# Patient Record
Sex: FEMALE | Race: WHITE | NOT HISPANIC OR LATINO | Employment: FULL TIME | URBAN - METROPOLITAN AREA
[De-identification: names, ages, dates, MRNs, and addresses within clinical notes are randomized per-mention and may not be internally consistent; named-entity substitution may affect disease eponyms.]

---

## 2017-02-20 DIAGNOSIS — F17.200 TOBACCO USE DISORDER: ICD-10-CM

## 2017-02-20 DIAGNOSIS — Z71.6 ENCOUNTER FOR SMOKING CESSATION COUNSELING: ICD-10-CM

## 2017-02-20 NOTE — TELEPHONE ENCOUNTER
Nicotine 14 MG / 24 hr patch    Last Written Prescription Date: 11/28/2016  Last Fill Quantity: 30, # refills: 1  Last Office Visit with [facility], [facility] or [facility] prescribing provider: 07/18/2016        BP Readings from Last 3 Encounters:   07/18/16 116/80     Minh Antunez CPhT  [facility]  [phone]

## 2017-02-23 NOTE — TELEPHONE ENCOUNTER
Routing refill request to provider for review/approval because:  Patient needs to be seen because:  Needs follow up per 7/18/2016 visit

## 2017-02-27 RX ORDER — NICOTINE 21 MG/24HR
1 PATCH, TRANSDERMAL 24 HOURS TRANSDERMAL EVERY 24 HOURS
Qty: 30 PATCH | Refills: 1 | OUTPATIENT
Start: 2017-02-27

## 2017-02-28 NOTE — TELEPHONE ENCOUNTER
Team coordinators-Please call patient to inform her either office visit or Evisit needed to address this refill request.    Thank you!  ANNABELLA NoelN, RN

## 2017-04-10 ENCOUNTER — OFFICE VISIT (OUTPATIENT)
Dept: PEDIATRICS | Facility: CLINIC | Age: 27
End: 2017-04-10
Payer: COMMERCIAL

## 2017-04-10 VITALS
SYSTOLIC BLOOD PRESSURE: 112 MMHG | OXYGEN SATURATION: 98 % | WEIGHT: 222.2 LBS | HEIGHT: 65 IN | DIASTOLIC BLOOD PRESSURE: 66 MMHG | BODY MASS INDEX: 37.02 KG/M2 | TEMPERATURE: 97.5 F | HEART RATE: 100 BPM

## 2017-04-10 DIAGNOSIS — Z00.00 ROUTINE HEALTH MAINTENANCE: Primary | ICD-10-CM

## 2017-04-10 DIAGNOSIS — R63.5 WEIGHT GAIN: ICD-10-CM

## 2017-04-10 DIAGNOSIS — Z72.0 TOBACCO ABUSE: ICD-10-CM

## 2017-04-10 DIAGNOSIS — H61.22 IMPACTED CERUMEN OF LEFT EAR: ICD-10-CM

## 2017-04-10 PROCEDURE — 99212 OFFICE O/P EST SF 10 MIN: CPT | Mod: 25 | Performed by: NURSE PRACTITIONER

## 2017-04-10 PROCEDURE — 99395 PREV VISIT EST AGE 18-39: CPT | Performed by: NURSE PRACTITIONER

## 2017-04-10 RX ORDER — NICOTINE 21 MG/24HR
1 PATCH, TRANSDERMAL 24 HOURS TRANSDERMAL EVERY 24 HOURS
Qty: 30 PATCH | Refills: 3 | Status: SHIPPED | OUTPATIENT
Start: 2017-04-10 | End: 2018-05-29

## 2017-04-10 ASSESSMENT — ANXIETY QUESTIONNAIRES
1. FEELING NERVOUS, ANXIOUS, OR ON EDGE: NOT AT ALL
6. BECOMING EASILY ANNOYED OR IRRITABLE: NOT AT ALL
IF YOU CHECKED OFF ANY PROBLEMS ON THIS QUESTIONNAIRE, HOW DIFFICULT HAVE THESE PROBLEMS MADE IT FOR YOU TO DO YOUR WORK, TAKE CARE OF THINGS AT HOME, OR GET ALONG WITH OTHER PEOPLE: NOT DIFFICULT AT ALL
2. NOT BEING ABLE TO STOP OR CONTROL WORRYING: NOT AT ALL
7. FEELING AFRAID AS IF SOMETHING AWFUL MIGHT HAPPEN: NOT AT ALL
5. BEING SO RESTLESS THAT IT IS HARD TO SIT STILL: NOT AT ALL
3. WORRYING TOO MUCH ABOUT DIFFERENT THINGS: NOT AT ALL
GAD7 TOTAL SCORE: 0

## 2017-04-10 ASSESSMENT — PATIENT HEALTH QUESTIONNAIRE - PHQ9: 5. POOR APPETITE OR OVEREATING: NOT AT ALL

## 2017-04-10 NOTE — MR AVS SNAPSHOT
After Visit Summary   4/10/2017    Nhung Concepcion    MRN: [id number]           Patient Information     Date Of Birth          1990        Visit Information        Provider Department      4/10/2017 1:20 PM Kimberly Navarro APRN [facility] Instructions    1. Use the highest dose of the nicotine patch (21 mg/day) for six weeks, followed by 14 mg/day for two weeks, and finish with 7 mg/day for two weeks  2. They can chew one piece of gum every one to two hours for six weeks, with a gradual reduction over a second six weeks, for a total duration of three months. Acidic beverages (eg, coffee, carbonated drinks) should be avoided before and during gum use, as acidic beverages lowers oral pH, causing nicotine to ionize and reducing nicotine absorption  3. Accountability check-in partner  4. Look into acupuncture and hypnosis.    5. Ask about switching from Paxil to one that doesn't cause weight gain  6. Come back with a 5 day food/drink journal.    Preventive Health Recommendations  Female Ages 26 - 39  Yearly exam:   See your health care provider every year in order to    Review health changes.     Discuss preventive care.      Review your medicines if you your doctor has prescribed any.    Until age 30: Get a Pap test every three years (more often if you have had an abnormal result).    After age 30: Talk to your doctor about whether you should have a Pap test every 3 years or have a Pap test with HPV screening every 5 years.   You do not need a Pap test if your uterus was removed (hysterectomy) and you have not had cancer.  You should be tested each year for STDs (sexually transmitted diseases), if you're at risk.   Talk to your provider about how often to have your cholesterol checked.  If you are at risk for diabetes, you should have a diabetes test (fasting glucose).  Shots: Get a flu shot each year. Get a tetanus shot every 10 years.   Nutrition:     Eat at least 5  servings of fruits and vegetables each day.    Eat whole-grain bread, whole-wheat pasta and brown rice instead of white grains and rice.    Talk to your provider about Calcium and Vitamin D.     Lifestyle    Exercise at least 150 minutes a week (30 minutes a day, 5 days of the week). This will help you control your weight and prevent disease.    Limit alcohol to one drink per day.    No smoking.     Wear sunscreen to prevent skin cancer.    See your dentist every six months for an exam and cleaning.          Follow-ups after your visit        Who to contact     If you have questions or need follow up information about today's clinic visit or your schedule please contact [facility] directly at [phone].  Normal or non-critical lab and imaging results will be communicated to you by SpinalMotionhart, letter or phone within 4 business days after the clinic has received the results. If you do not hear from us within 7 days, please contact the clinic through Fishkit or phone. If you have a critical or abnormal lab result, we will notify you by phone as soon as possible.  Submit refill requests through Rollbase (acquired by [employer]) or call your pharmacy and they will forward the refill request to us. Please allow 3 business days for your refill to be completed.          Additional Information About Your Visit        Rollbase (acquired [employer]) Information     Rollbase (acquired by [employer]) gives you secure access to your electronic health record. If you see a primary care provider, you can also send messages to your care team and make appointments. If you have questions, please call your primary care clinic.  If you do not have a primary care provider, please call [phone] and they will assist you.        Care EveryWhere ID     This is your Care EveryWhere ID. This could be used by other organizations to access your [place] medical records  [id number]        Your Vitals Were     Pulse Temperature Height Last Period Pulse Oximetry BMI (Body Mass Index)    100 97.5  F (36.4  " C) (Tympanic) 5' 4.5\" (1.638 m) 03/20/2017 (Approximate) 98% 37.55 kg/m2       Blood Pressure from Last 3 Encounters:   04/10/17 112/66   07/18/16 116/80    Weight from Last 3 Encounters:   04/10/17 222 lb 3.2 oz (100.8 kg)   07/18/16 208 lb (94.3 kg)              Today, you had the following     No orders found for display         Today's Medication Changes          These changes are accurate as of: 4/10/17  2:14 PM.  If you have any questions, ask your nurse or doctor.               These medicines have changed or have updated prescriptions.        Dose/Directions    omeprazole 40 MG capsule   Commonly known as:  priLOSEC   This may have changed:  Another medication with the same name was removed. Continue taking this medication, and follow the directions you see here.   Used for:  Gastroesophageal reflux disease without esophagitis   Changed by:  Kristal Gordillo APRN CNP        Dose:  40 mg   Take 1 capsule (40 mg) by mouth daily Take 30-60 minutes before a meal.   Quantity:  90 capsule   Refills:  3         Stop taking these medicines if you haven't already. Please contact your care team if you have questions.     FLUOXETINE HCL PO   Stopped by:  Kimberly Navarro APRN CNP                    Primary Care Provider Office Phone # Fax #    YEN Guo [phone]       [address] [place] [address]        Thank you!     Thank you for choosing [facility]  for your care. Our goal is always to provide you with excellent care. Hearing back from our patients is one way we can continue to improve our services. Please take a few minutes to complete the written survey that you may receive in the mail after your visit with us. Thank you!             Your Updated Medication List - Protect others around you: Learn how to safely use, store and throw away your medicines at www.disposemymeds.org.          This list is accurate as of: " 4/10/17  2:14 PM.  Always use your most recent med list.                   Brand Name Dispense Instructions for use    FLUoxetine 20 MG capsule    PROzac    90 capsule    Take 1 capsule (20 mg) by mouth daily       nicotine 14 MG/24HR 24 hr patch    NICODERM CQ    30 patch    Place 1 patch onto the skin every 24 hours       omeprazole 40 MG capsule    priLOSEC    90 capsule    Take 1 capsule (40 mg) by mouth daily Take 30-60 minutes before a meal.       PAXIL PO      Take 10 mg by mouth daily Patient unsure of name - thinks it is this one - unsure of dose

## 2017-04-10 NOTE — PROGRESS NOTES
"   SUBJECTIVE:     CC: Nhung Concepcion is an 27 year old woman who presents for preventive health visit.     Answers for HPI/ROS submitted by the patient on 4/10/2017   Annual Exam:  Getting at least 3 servings of Calcium per day:: Yes  Bi-annual eye exam:: NO  Dental care twice a year:: Yes  Sleep apnea or symptoms of sleep apnea:: None  Diet:: Regular (no restrictions)  Frequency of exercise:: 1 day/week  Taking medications regularly:: Yes  Medication side effects:: None  Additional concerns today:: No  Q1: Little interest or pleasure in doing things: 0=Not at all  Q2: Feeling down, depressed or hopeless: 0=Not at all  PHQ-2 Score: 0  Duration of exercise:: N/A    Concerns today: wants refills of nicotine patches. Smokes about 1 PPD. Has been using 21mg patches. No gum or lozenge. Triggers include softball and \"going out.\" Recent worsening of anxiety and depression.   Has had some ear itchiness on left side x 6 months. No headphones or ear buds. No fevers.   Weight gain: Worsened in the last 3months. Has been on Paxil for 1 month. Thinks the depression has caused it. Has given up and is now eating whatever she wants.     -------------------------------------    Today's PHQ-2 Score:   PHQ-2 ( 1999 Pfizer) 4/10/2017 4/10/2017   Q1: Little interest or pleasure in doing things 0 0   Q2: Feeling down, depressed or hopeless 0 0   PHQ-2 Score 0 0   Little interest or pleasure in doing things Not at all -   Feeling down, depressed or hopeless Not at all -   PHQ-2 Score 0 -     Abuse: Current or Past(Physical, Sexual or Emotional)- No  Do you feel safe in your environment - Yes    Social History   Substance Use Topics     Smoking status: Former Smoker     Types: Cigarettes     Quit date: 4/1/2017     Smokeless tobacco: Never Used     Alcohol use Yes      Comment: 2 times per month, 4 drinks per time     The patient does not drink >3 drinks per day nor >7 drinks per week.    No results for input(s): CHOL, HDL, LDL, TRIG, " "TYSON CARMONA in the last 26335 hours.    Reviewed orders with patient.  Reviewed health maintenance and updated orders accordingly - Yes    Mammo Decision Support:  Mammogram not appropriate for this patient based on age.    Pertinent mammograms are reviewed under the imaging tab.  History of abnormal Pap smear: NO - age 30- 65 PAP every 3 years recommended    Reviewed and updated as needed this visit by clinical staff  Tobacco  Allergies  Meds  Med Hx  Surg Hx  Fam Hx  Soc Hx        Reviewed and updated as needed this visit by Provider            ROS:  C: NEGATIVE for fever, chills, change in weight  I: NEGATIVE for worrisome rashes, moles or lesions  E: NEGATIVE for vision changes or irritation  ENT: NEGATIVE for ear, mouth and throat problems  R: NEGATIVE for significant cough or SOB  B: NEGATIVE for masses, tenderness or discharge  CV: NEGATIVE for chest pain, palpitations or peripheral edema  GI: NEGATIVE for nausea, abdominal pain, heartburn, or change in bowel habits  : NEGATIVE for unusual urinary or vaginal symptoms. Periods are regular.  M: NEGATIVE for significant arthralgias or myalgia  N: NEGATIVE for weakness, dizziness or paresthesias  P: NEGATIVE for changes in mood or affect    Problem list, Medication list, Allergies, and Medical/Social/Surgical histories reviewed in [facility] and updated as appropriate.  OBJECTIVE:     /66 (Cuff Size: Adult Large)  Pulse 100  Temp 97.5  F (36.4  C) (Tympanic)  Ht 5' 4.5\" (1.638 m)  Wt 222 lb 3.2 oz (100.8 kg)  LMP 03/20/2017 (Approximate)  SpO2 98%  BMI 37.55 kg/m2  EXAM:  GENERAL: healthy, alert and no distress  EYES: Eyes grossly normal to inspection, PERRL and conjunctivae and sclerae normal  HENT: Impacted cerumen left. Clear post-irrigation. Right ear unremarkable.  NECK: no adenopathy, no asymmetry, masses, or scars and thyroid normal to palpation  RESP: lungs clear to auscultation - no rales, rhonchi or wheezes  BREAST: normal without " masses, tenderness or nipple discharge and no palpable axillary masses or adenopathy  CV: regular rate and rhythm, normal S1 S2, no S3 or S4, no murmur, click or rub, no peripheral edema and peripheral pulses strong  ABDOMEN: soft, nontender, no hepatosplenomegaly, no masses and bowel sounds normal  MS: no gross musculoskeletal defects noted, no edema  SKIN: no suspicious lesions or rashes  NEURO: Normal strength and tone, mentation intact and speech normal  PSYCH: mentation appears normal, affect normal/bright    ASSESSMENT/PLAN:     1. Routine health maintenance  Declines labs today. Will have her come fasting at next year's physical.  States she had an abnormal pap 3 paps ago. Has had 2 normal paps since then.     2. Weight gain  Likely multifactorial. Likely due to depression as well as depression medications.   Asked her to discuss Paxil with her psychiatrist and see if she can switch to something with less weight gain potential.  I asked her to bring in a 5 day food journal to discuss.   Will consider weight loss meds and checking thyroid.     3. Tobacco abuse  1 PPD. Has had difficulty quitting. Social events trigger smoking desire.   - nicotine (NICODERM CQ) 21 MG/24HR 24 hr patch; Place 1 patch onto the skin every 24 hours  Dispense: 30 patch; Refill: 3  - nicotine polacrilex ([employer]S NICOTINE POLACRILEX) 2 MG gum; Place 1 each (2 mg) inside cheek as needed for smoking cessation  Dispense: 60 tablet; Refill: 3  -Declines quitplan.gov.    Patient Instructions   1. Use the highest dose of the nicotine patch (21 mg/day) for six weeks, followed by 14 mg/day for two weeks, and finish with 7 mg/day for two weeks  2. They can chew one piece of gum every one to two hours for six weeks, with a gradual reduction over a second six weeks, for a total duration of three months. Acidic beverages (eg, coffee, carbonated drinks) should be avoided before and during gum use, as acidic beverages lowers oral pH, causing nicotine to  "ionize and reducing nicotine absorption  3. Accountability check-in partner  4. Look into acupuncture and hypnosis for smoking cessation.      4. Impacted cerumen of left ear  Resolved. Call if ear still itching.       COUNSELING:   Reviewed preventive health counseling, as reflected in patient instructions         reports that she quit smoking 9 days ago. Her smoking use included Cigarettes. She has never used smokeless tobacco.    Estimated body mass index is 37.55 kg/(m^2) as calculated from the following:    Height as of this encounter: 5' 4.5\" (1.638 m).    Weight as of this encounter: 222 lb 3.2 oz (100.8 kg).   Weight management plan: Discussed healthy diet and exercise guidelines and patient will follow up in 12 months in clinic to re-evaluate.    Counseling Resources:  ATP IV Guidelines  Pooled Cohorts Equation Calculator  Breast Cancer Risk Calculator  FRAX Risk Assessment  ICSI Preventive Guidelines  Dietary Guidelines for Americans, 2010  USDA's MyPlate  ASA Prophylaxis  Lung CA Screening    Kimberly Navarro, YEN BALDERRAMA  [facility]  "

## 2017-04-10 NOTE — PATIENT INSTRUCTIONS
1. Use the highest dose of the nicotine patch (21 mg/day) for six weeks, followed by 14 mg/day for two weeks, and finish with 7 mg/day for two weeks  2. They can chew one piece of gum every one to two hours for six weeks, with a gradual reduction over a second six weeks, for a total duration of three months. Acidic beverages (eg, coffee, carbonated drinks) should be avoided before and during gum use, as acidic beverages lowers oral pH, causing nicotine to ionize and reducing nicotine absorption  3. Accountability check-in partner  4. Look into acupuncture and hypnosis.    5. Ask about switching from Paxil to one that doesn't cause weight gain  6. Come back with a 5 day food/drink journal.    Preventive Health Recommendations  Female Ages 26 - 39  Yearly exam:   See your health care provider every year in order to    Review health changes.     Discuss preventive care.      Review your medicines if you your doctor has prescribed any.    Until age 30: Get a Pap test every three years (more often if you have had an abnormal result).    After age 30: Talk to your doctor about whether you should have a Pap test every 3 years or have a Pap test with HPV screening every 5 years.   You do not need a Pap test if your uterus was removed (hysterectomy) and you have not had cancer.  You should be tested each year for STDs (sexually transmitted diseases), if you're at risk.   Talk to your provider about how often to have your cholesterol checked.  If you are at risk for diabetes, you should have a diabetes test (fasting glucose).  Shots: Get a flu shot each year. Get a tetanus shot every 10 years.   Nutrition:     Eat at least 5 servings of fruits and vegetables each day.    Eat whole-grain bread, whole-wheat pasta and brown rice instead of white grains and rice.    Talk to your provider about Calcium and Vitamin D.     Lifestyle    Exercise at least 150 minutes a week (30 minutes a day, 5 days of the week). This will help you  control your weight and prevent disease.    Limit alcohol to one drink per day.    No smoking.     Wear sunscreen to prevent skin cancer.    See your dentist every six months for an exam and cleaning.

## 2017-04-10 NOTE — NURSING NOTE
"Chief Complaint   Patient presents with     Physical       Initial /66 (Cuff Size: Adult Large)  Pulse 100  Temp 97.5  F (36.4  C) (Tympanic)  Ht 5' 4.5\" (1.638 m)  Wt 222 lb 3.2 oz (100.8 kg)  LMP 03/20/2017 (Approximate)  SpO2 98%  BMI 37.55 kg/m2 Estimated body mass index is 37.55 kg/(m^2) as calculated from the following:    Height as of this encounter: 5' 4.5\" (1.638 m).    Weight as of this encounter: 222 lb 3.2 oz (100.8 kg).  Medication Reconciliation: complete   Magi Sanchez CMA    "

## 2017-04-10 NOTE — NURSING NOTE
LEft ear wash completed. moderate amount of cerumen removed from left ear. Patient tolerated procedure well.  Magi Sanchez, CMA

## 2017-04-11 ASSESSMENT — PATIENT HEALTH QUESTIONNAIRE - PHQ9: SUM OF ALL RESPONSES TO PHQ QUESTIONS 1-9: 0

## 2017-04-11 ASSESSMENT — ANXIETY QUESTIONNAIRES: GAD7 TOTAL SCORE: 0

## 2017-04-17 ENCOUNTER — OFFICE VISIT (OUTPATIENT)
Dept: PEDIATRICS | Facility: CLINIC | Age: 27
End: 2017-04-17
Payer: COMMERCIAL

## 2017-04-17 VITALS
OXYGEN SATURATION: 98 % | BODY MASS INDEX: 37.2 KG/M2 | HEART RATE: 85 BPM | HEIGHT: 65 IN | DIASTOLIC BLOOD PRESSURE: 62 MMHG | SYSTOLIC BLOOD PRESSURE: 108 MMHG | WEIGHT: 223.3 LBS | TEMPERATURE: 97.3 F

## 2017-04-17 DIAGNOSIS — R63.5 WEIGHT GAIN: Primary | ICD-10-CM

## 2017-04-17 DIAGNOSIS — H60.502 ACUTE OTITIS EXTERNA OF LEFT EAR, UNSPECIFIED TYPE: ICD-10-CM

## 2017-04-17 DIAGNOSIS — L67.8 ABNORMAL FACIAL HAIR: ICD-10-CM

## 2017-04-17 PROCEDURE — 99214 OFFICE O/P EST MOD 30 MIN: CPT | Performed by: NURSE PRACTITIONER

## 2017-04-17 RX ORDER — CLOTRIMAZOLE 1 G/ML
SOLUTION TOPICAL 2 TIMES DAILY
Qty: 60 ML | Refills: 1 | Status: SHIPPED | OUTPATIENT
Start: 2017-04-17 | End: 2017-05-01

## 2017-04-17 NOTE — NURSING NOTE
"Chief Complaint   Patient presents with     RECHECK     weight issue       Initial /62 (Cuff Size: Adult Large)  Pulse 85  Temp 97.3  F (36.3  C) (Tympanic)  Ht 5' 4.5\" (1.638 m)  Wt 223 lb 4.8 oz (101.3 kg)  LMP 03/20/2017 (Approximate)  SpO2 98%  BMI 37.74 kg/m2 Estimated body mass index is 37.74 kg/(m^2) as calculated from the following:    Height as of this encounter: 5' 4.5\" (1.638 m).    Weight as of this encounter: 223 lb 4.8 oz (101.3 kg).  Medication Reconciliation: complete   Magi Sanchez, RADHA    "

## 2017-04-17 NOTE — PATIENT INSTRUCTIONS
1. Do weight watchers for 1 month  2. You need vigorous exercise 2 days a week.   3. Talk to psychiatrist about Paxil.   4. 30 squats every morning.

## 2017-04-17 NOTE — MR AVS SNAPSHOT
After Visit Summary   4/17/2017    Nhung Concepcion    MRN: [id number]           Patient Information     Date Of Birth          1990        Visit Information        Provider Department      4/17/2017 4:20 PM Kimberly Navarro APRN CNP [facility]        Today's Diagnoses     Acute otitis externa of left ear, unspecified type    -  1      Care Instructions    1. Do weight watchers for 1 month  2. You need vigorous exercise 2 days a week.   3. Talk to psychiatrist about Paxil.   4. 30 squats every morning.         Follow-ups after your visit        Who to contact     If you have questions or need follow up information about today's clinic visit or your schedule please contact [facility] directly at [phone].  Normal or non-critical lab and imaging results will be communicated to you by Sonya Labshart, letter or phone within 4 business days after the clinic has received the results. If you do not hear from us within 7 days, please contact the clinic through Sonya Labshart or phone. If you have a critical or abnormal lab result, we will notify you by phone as soon as possible.  Submit refill requests through [employer] or call your pharmacy and they will forward the refill request to us. Please allow 3 business days for your refill to be completed.          Additional Information About Your Visit        MyChart Information     [employer] gives you secure access to your electronic health record. If you see a primary care provider, you can also send messages to your care team and make appointments. If you have questions, please call your primary care clinic.  If you do not have a primary care provider, please call [phone] and they will assist you.        Care EveryWhere ID     This is your Care EveryWhere ID. This could be used by other organizations to access your [place] medical records  [id number]        Your Vitals Were     Pulse Temperature Height Last Period Pulse Oximetry BMI  "(Body Mass Index)    85 97.3  F (36.3  C) (Tympanic) 5' 4.5\" (1.638 m) 03/20/2017 (Approximate) 98% 37.74 kg/m2       Blood Pressure from Last 3 Encounters:   04/17/17 108/62   04/10/17 112/66   07/18/16 116/80    Weight from Last 3 Encounters:   04/17/17 223 lb 4.8 oz (101.3 kg)   04/10/17 222 lb 3.2 oz (100.8 kg)   07/18/16 208 lb (94.3 kg)              Today, you had the following     No orders found for display         Today's Medication Changes          These changes are accurate as of: 4/17/17  4:54 PM.  If you have any questions, ask your nurse or doctor.               Start taking these medicines.        Dose/Directions    clotrimazole 1 % solution   Commonly known as:  LOTRIMIN   Used for:  Acute otitis externa of left ear, unspecified type   Started by:  Kimberly Navarro APRN CNP        Apply topically 2 times daily for 14 days   Quantity:  60 mL   Refills:  1            Where to get your medicines      These medications were sent to [facility] [place], [place] - [address]  [address] [address]     Phone:  [phone]     clotrimazole 1 % solution                Primary Care Provider Office Phone # Fax #    YEN Guo [phone]       [address] [place] [address]        Thank you!     Thank you for choosing [facility]  for your care. Our goal is always to provide you with excellent care. Hearing back from our patients is one way we can continue to improve our services. Please take a few minutes to complete the written survey that you may receive in the mail after your visit with us. Thank you!             Your Updated Medication List - Protect others around you: Learn how to safely use, store and throw away your medicines at www.disposemymeds.org.          This list is accurate as of: 4/17/17  4:54 PM.  Always use your most recent med list.                   Brand " Name Dispense Instructions for use    clotrimazole 1 % solution    LOTRIMIN    60 mL    Apply topically 2 times daily for 14 days       FLUoxetine 20 MG capsule    PROzac    90 capsule    Take 1 capsule (20 mg) by mouth daily       * nicotine 14 MG/24HR 24 hr patch    NICODERM CQ    30 patch    Place 1 patch onto the skin every 24 hours       * nicotine 21 MG/24HR 24 hr patch    NICODERM CQ    30 patch    Place 1 patch onto the skin every 24 hours       nicotine polacrilex 2 MG gum    CVS NICOTINE POLACRILEX    60 tablet    Place 1 each (2 mg) inside cheek as needed for smoking cessation       omeprazole 40 MG capsule    priLOSEC    90 capsule    Take 1 capsule (40 mg) by mouth daily Take 30-60 minutes before a meal.       PAXIL PO      Take 10 mg by mouth daily Patient unsure of name - thinks it is this one - unsure of dose       * Notice:  This list has 2 medication(s) that are the same as other medications prescribed for you. Read the directions carefully, and ask your doctor or other care provider to review them with you.

## 2017-04-17 NOTE — PROGRESS NOTES
"  SUBJECTIVE:                                                    Nhung Concepcion is a 27 year old female who presents to clinic today for the following health issues:    Patient here for recheck of weight issue.:      Bring food log:  Eating meat, rice, other carbs. Zero fruits and veggies as it is \"not my thing.\"  Reports having been \"good\" while doing this food journal and still going over on calories recommended by my[employer]ubrikar Aortic Valve pal to lose weight.    Depression is severe right now. Started Paxil a few weeks ago. Has contacted her psychiatrist but hasn't heard back about switching to a medicine that won't cause weight gain.    ROS: const/endo/psych otherwise negative     OBJECTIVE:  /62 (Cuff Size: Adult Large)  Pulse 85  Temp 97.3  F (36.3  C) (Tympanic)  Ht 5' 4.5\" (1.638 m)  Wt 223 lb 4.8 oz (101.3 kg)  LMP 03/20/2017 (Approximate)  SpO2 98%  BMI 37.74 kg/m2  CONSTITUTIONAL: Alert, well-nourished, well-groomed, NAD  RESP: Lungs CTA. No wheeze, rhonchi, rales.  CV: HRRR S1 S2 No MRG. No peripheral edema      ASSESSMENT/PLAN:  (R63.5) Weight gain  (primary encounter diagnosis)  Comment: Likely due to diet. See above. Eating zero fruits and veggies, processed grains, and meat. Endorses eating less than normal while she was doing the food log.   Plan: Discussed that, in order to lose weight, she needs to follow myThubrikar Aortic Valve pal plan more strictly for several months. Alternatively, she can follow weight watchers. Increase vegetables and whole grains. Decrease processed foods.   Vigorous exercise 2 times weekly, daily activity as well.  Switch from Paxil to a less weight gain potential medication.      (L67.8) Abnormal facial hair  Comment: Facial hair and increased body hair, not in androgen sensitive areas with normal periods. Likely PCOS despite normal periods. Likely worsened by weight gain.   Plan: **Testosterone Free and Total FUTURE anytime,         Prolactin, Follicle stimulating hormone, TSH         " with free T4 reflex, 17 Hydroxyprogesterone         Pediatric            (H60.502) Acute otitis externa of left ear, unspecified type  Comment: Likely fungal. Her main sx is itching.   Plan: clotrimazole (LOTRIMIN) 1 % solution                Kimberly Navarro, CASTILLO-LINO.

## 2017-04-24 ENCOUNTER — TELEPHONE (OUTPATIENT)
Dept: PEDIATRICS | Facility: CLINIC | Age: 27
End: 2017-04-24

## 2017-04-24 NOTE — LETTER
[facility]  [address] [place] [address]  [phone]      April 24, 2017    Nhung Concepcion                                                                                                                                                       [address] [place] [id number]                Tarik Kelly,     I have ordered some labs to be done to look into your body hair issue. Please schedule a lab visit. Ideally you would come fasting (nothing to eat or drink for 10 hours) between the hours of 8 and 10am for most accurate results.     Thanks!     Kimberly Navarro, CASTILLO-LINO.

## 2017-04-24 NOTE — TELEPHONE ENCOUNTER
Tarik Kelly,    I have ordered some labs to be done to look into your body hair issue. Please schedule a lab visit. Ideally you would come fasting (nothing to eat or drink for 10 hours) between the hours of 8 and 10am for most accurate results.    Thanks!    Kimberly Navarro, CASTILLO-LINO.

## 2017-05-05 DIAGNOSIS — L67.8 ABNORMAL FACIAL HAIR: ICD-10-CM

## 2017-05-05 LAB
FSH SERPL-ACNC: 1.9 IU/L
PROLACTIN SERPL-MCNC: 21 UG/L (ref 3–27)
TSH SERPL DL<=0.005 MIU/L-ACNC: 0.86 MU/L (ref 0.4–4)

## 2017-05-05 PROCEDURE — 99000 SPECIMEN HANDLING OFFICE-LAB: CPT | Performed by: NURSE PRACTITIONER

## 2017-05-05 PROCEDURE — 84146 ASSAY OF PROLACTIN: CPT | Performed by: NURSE PRACTITIONER

## 2017-05-05 PROCEDURE — 83001 ASSAY OF GONADOTROPIN (FSH): CPT | Performed by: NURSE PRACTITIONER

## 2017-05-05 PROCEDURE — 83498 ASY HYDROXYPROGESTERONE 17-D: CPT | Mod: 90 | Performed by: NURSE PRACTITIONER

## 2017-05-05 PROCEDURE — 36415 COLL VENOUS BLD VENIPUNCTURE: CPT | Performed by: NURSE PRACTITIONER

## 2017-05-05 PROCEDURE — 84403 ASSAY OF TOTAL TESTOSTERONE: CPT | Performed by: NURSE PRACTITIONER

## 2017-05-05 PROCEDURE — 84270 ASSAY OF SEX HORMONE GLOBUL: CPT | Performed by: NURSE PRACTITIONER

## 2017-05-05 PROCEDURE — 84443 ASSAY THYROID STIM HORMONE: CPT | Performed by: NURSE PRACTITIONER

## 2017-05-06 LAB
SHBG SERPL-SCNC: 23 NMOL/L (ref 30–135)
TESTOST FREE SERPL-MCNC: 0.5 NG/DL (ref 0.08–0.74)
TESTOST SERPL-MCNC: 23 NG/DL (ref 8–60)

## 2017-05-14 LAB — LAB SCANNED RESULT: NORMAL

## 2017-05-17 ENCOUNTER — MYC REFILL (OUTPATIENT)
Dept: PEDIATRICS | Facility: CLINIC | Age: 27
End: 2017-05-17

## 2017-05-17 DIAGNOSIS — Z72.0 TOBACCO ABUSE: ICD-10-CM

## 2017-05-17 RX ORDER — NICOTINE 21 MG/24HR
1 PATCH, TRANSDERMAL 24 HOURS TRANSDERMAL EVERY 24 HOURS
Qty: 30 PATCH | Refills: 3 | Status: CANCELLED | OUTPATIENT
Start: 2017-05-17

## 2017-05-18 NOTE — TELEPHONE ENCOUNTER
Message from MyChart:  Original authorizing provider: YEN Guo CNP would like a refill of the following medications:  nicotine (NICODERM CQ) 21 MG/24HR 24 hr patch [YEN Guo CNP]    Preferred pharmacy: [facility] - [place], [place] - [address] [place] [address]    Comment:

## 2017-05-18 NOTE — TELEPHONE ENCOUNTER
Has refills left, no refill needed now. Notified pt.    Nicotine patches     Last Written Prescription Date: 04/10/17  Last Fill Quantity: 30, # refills: 3  Last Office Visit with G, ANA or [facility] prescribing provider: 04/17/17        BP Readings from Last 3 Encounters:   04/17/17 108/62   04/10/17 112/66   07/18/16 116/80     Leland, RN  Triage Nurse

## 2017-06-12 ENCOUNTER — MYC REFILL (OUTPATIENT)
Dept: PEDIATRICS | Facility: CLINIC | Age: 27
End: 2017-06-12

## 2017-06-12 DIAGNOSIS — Z72.0 TOBACCO ABUSE: ICD-10-CM

## 2017-06-13 ENCOUNTER — HOSPITAL ENCOUNTER (EMERGENCY)
Facility: CLINIC | Age: 27
Discharge: HOME | End: 2017-06-13
Attending: EMERGENCY MEDICINE | Admitting: EMERGENCY MEDICINE
Payer: COMMERCIAL

## 2017-06-13 VITALS
TEMPERATURE: 97.9 F | HEART RATE: 76 BPM | DIASTOLIC BLOOD PRESSURE: 80 MMHG | RESPIRATION RATE: 16 BRPM | SYSTOLIC BLOOD PRESSURE: 125 MMHG | OXYGEN SATURATION: 98 %
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
## 2017-06-13 DIAGNOSIS — S80.812A ABRASION OF LEFT LEG, INITIAL ENCOUNTER: ICD-10-CM

## 2017-06-13 DIAGNOSIS — S81.802A WOUND OF LEFT LEG, INITIAL ENCOUNTER: ICD-10-CM

## 2017-06-13 PROCEDURE — 99282 EMERGENCY DEPT VISIT SF MDM: CPT | Performed by: EMERGENCY MEDICINE

## 2017-06-13 PROCEDURE — 99282 EMERGENCY DEPT VISIT SF MDM: CPT | Mod: Z6 | Performed by: EMERGENCY MEDICINE

## 2017-06-13 RX ORDER — CEPHALEXIN 500 MG/1
500 CAPSULE ORAL 4 TIMES DAILY
Qty: 40 CAPSULE | Refills: 0 | Status: SHIPPED | OUTPATIENT
Start: 2017-06-13 | End: 2017-06-23

## 2017-06-13 ASSESSMENT — ENCOUNTER SYMPTOMS
FEVER: 0
COLOR CHANGE: 1
CHILLS: 0
WOUND: 1

## 2017-06-13 NOTE — ED AVS SNAPSHOT
[facility], Emergency Department    [address] [place] [address]    Phone:  [phone]                                       Nhung Concepcion   MRN: [id number]    Department:  [facility], Emergency Department   Date of Visit:  6/13/2017           Patient Information     Date Of Birth          1990        Your diagnoses for this visit were:     Wound of left leg, initial encounter        You were seen by Aaliyah Tavares MD.      Follow-up Information     Follow up with Kimberly Navarro APRN CNP In 2 days.    Specialty:  Nurse Practitioner    Why:  As needed    Contact information:    [facility]  [address] [place] [address]  [phone]        24 Hour Appointment Hotline       To make an appointment at any [facility], call [phone] ([phone]). If you don't have a family doctor or clinic, we will help you find one. [facility] clinics are conveniently located to serve the needs of you and your family.             Review of your medicines      START taking        Dose / Directions Last dose taken    cephALEXin 500 MG capsule   Commonly known as:  KEFLEX   Dose:  500 mg   Quantity:  40 capsule        Take 1 capsule (500 mg) by mouth 4 times daily for 10 days   Refills:  0          Our records show that you are taking the medicines listed below. If these are incorrect, please call your family doctor or clinic.        Dose / Directions Last dose taken    FLUoxetine 20 MG capsule   Commonly known as:  PROzac   Dose:  20 mg   Quantity:  90 capsule        Take 1 capsule (20 mg) by mouth daily   Refills:  1        * nicotine 14 MG/24HR 24 hr patch   Commonly known as:  NICODERM CQ   Dose:  1 patch   Quantity:  30 patch        Place 1 patch onto the skin every 24 hours   Refills:  1        * nicotine 21 MG/24HR 24 hr patch   Commonly known as:  NICODERM CQ   Dose:  1 patch   Quantity:  30 patch        Place 1 patch onto the skin every 24 hours   Refills:   3        nicotine polacrilex 2 MG gum   Commonly known as:  CVS NICOTINE POLACRILEX   Dose:  2 mg   Quantity:  60 tablet        Place 1 each (2 mg) inside cheek as needed for smoking cessation   Refills:  3        omeprazole 40 MG capsule   Commonly known as:  priLOSEC   Dose:  40 mg   Quantity:  90 capsule        Take 1 capsule (40 mg) by mouth daily Take 30-60 minutes before a meal.   Refills:  3        * Notice:  This list has 2 medication(s) that are the same as other medications prescribed for you. Read the directions carefully, and ask your doctor or other care provider to review them with you.            Prescriptions were sent or printed at these locations (1 Prescription)                   Other Prescriptions                Printed at Department/Unit printer (1 of 1)         cephALEXin (KEFLEX) 500 MG capsule                Orders Needing Specimen Collection     None      Pending Results     No orders found from 6/11/2017 to 6/14/2017.            Pending Culture Results     No orders found from 6/11/2017 to 6/14/2017.            Pending Results Instructions     If you had any lab results that were not finalized at the time of your Discharge, you can call the ED Lab Result RN at [phone]. You will be contacted by this team for any positive Lab results or changes in treatment. The nurses are available 7 days a week from 10A to 6:30P.  You can leave a message 24 hours per day and they will return your call.        Thank you for choosing [facility]       Thank you for choosing [facility] for your care. Our goal is always to provide you with excellent care. Hearing back from our patients is one way we can continue to improve our services. Please take a few minutes to complete the written survey that you may receive in the mail after you visit with us. Thank you!        [employer] Information     [email or web address] gives you secure access to your electronic health record. If you see a primary care provider, you can also send  messages to your care team and make appointments. If you have questions, please call your primary care clinic.  If you do not have a primary care provider, please call [phone] and they will assist you.        Care EveryWhere ID     This is your Care EveryWhere ID. This could be used by other organizations to access your [facility] medical records  [id number]        After Visit Summary       This is your record. Keep this with you and show to your community pharmacist(s) and doctor(s) at your next visit.

## 2017-06-13 NOTE — ED NOTES
Pt present ambulatory to triage from work via car. Pt states 6 days ago slid while playing softball and has large abrasion at left lower leg. CMS intact. PT concerned for infection, instructed ot come to ED by PCP. Pt A and O x 4 and afebrile.

## 2017-06-13 NOTE — ED AVS SNAPSHOT
[facility], [place], Emergency Department    [address] [place] [address]    Phone:  [phone]                                       Nhung Concepcion   MRN: [id number]    Department:  [facility], Emergency Department   Date of Visit:  6/13/2017           After Visit Summary Signature Page     I have received my discharge instructions, and my questions have been answered. I have discussed any challenges I see with this plan with the nurse or doctor.    ..........................................................................................................................................  Patient/Patient Representative Signature      ..........................................................................................................................................  Patient Representative Print Name and Relationship to Patient    ..................................................               ................................................  Date                                            Time    ..........................................................................................................................................  Reviewed by Signature/Title    ...................................................              ..............................................  Date                                                            Time

## 2017-06-13 NOTE — ED PROVIDER NOTES
History     Chief Complaint   Patient presents with     Wound Check     HPI   Nhung Concepcion is a 27 year old female who is here with concern for wound infection. The patient states that she was playing softball about 6 days ago and she was sliding. She sustained a significant abrasion at that time, but she feels as though the wound is looking worse. She describes a red painful wound. It encompasses her entire left lower leg. Pain is rated at a 5. She has been using bacitracin at home. She went into work today, she works as a , and they advised her to come into the emergency room to be seen. No fevers, chills, no systemic signs or symptoms.    This part of the medical record was transcribed by Jenni Manjarrez Medical Scribe, from a dictation done by Aaliyah Tavares MD.      Allergies   Allergen Reactions     Sulfa Drugs Rash         I have reviewed the Medications, Allergies, Past Medical and Surgical History, and Social History in the Epic system and with the patient.    Review of Systems   Constitutional: Negative for chills and fever.   Skin: Positive for color change (redness in the area of the wound) and wound (left lower leg abrasion).       Physical Exam   BP: 149/86  Pulse: 71  Temp: 97.9  F (36.6  C)  Resp: 14  SpO2: 97 %  Physical Exam   Constitutional: She is oriented to person, place, and time. She appears well-developed and well-nourished.   HENT:   Head: Atraumatic.   Right Ear: External ear normal.   Left Ear: External ear normal.   Eyes: Conjunctivae and EOM are normal. No scleral icterus.   Neck: Normal range of motion. Neck supple.   Cardiovascular: Normal rate and regular rhythm.    Pulmonary/Chest: Effort normal. No respiratory distress.   Abdominal: Soft. There is no tenderness. There is no rebound and no guarding.   Musculoskeletal: Normal range of motion. She exhibits no edema or tenderness.   Neurological: She is alert and oriented to person, place, and time.   Skin: Skin is warm and  dry. No rash noted.   Large LLE abrasion with well demarcated borders   Psychiatric: She has a normal mood and affect. Her behavior is normal.   Nursing note and vitals reviewed.      ED Course     ED Course     Procedures      Assessments & Plan (with Medical Decision Making)   27 year old female who presents with concern for wound infection. She has no systemic signs or symptoms of illness. Her vital signs are normal. Her wound to me appears to be more of an inflammatory response. I am not particularly convinced that this is a cellulitis at this point. I will, however, provide the patient with a prescription for Keflex. She will carolina the margins of the wound and if she feels as though it is getting worse in 24 hours, she will start taking the antibiotics. She will return to the emergency room for any signs or symptoms of infection.    This part of the medical record was transcribed by Jenni Manjarrez, Medical Scribe, from a dictation done by Aaliyah Tavares MD.      I have reviewed the nursing notes.    I have reviewed the findings, diagnosis, plan and need for follow up with the patient.    Discharge Medication List as of 6/13/2017 10:18 AM      START taking these medications    Details   cephALEXin (KEFLEX) 500 MG capsule Take 1 capsule (500 mg) by mouth 4 times daily for 10 days, Disp-40 capsule, R-0, Local Print             Final diagnoses:   Wound of left leg, initial encounter     6/13/2017   [facility], [place], EMERGENCY DEPARTMENT     Aaliyah Tavares MD  06/13/17 4022

## 2017-06-13 NOTE — TELEPHONE ENCOUNTER
Message from MyChart:  Original authorizing provider: YEN Guo CNP would like a refill of the following medications:  nicotine polacrilex (CVS NICOTINE POLACRILEX) 2 MG gum [YEN Guo CNP]    Preferred pharmacy: [facility] - [place], [place] - [address] [place] [address]    Comment:

## 2017-06-14 ENCOUNTER — MYC REFILL (OUTPATIENT)
Dept: PEDIATRICS | Facility: CLINIC | Age: 27
End: 2017-06-14

## 2017-06-14 DIAGNOSIS — Z72.0 TOBACCO ABUSE: ICD-10-CM

## 2017-06-15 NOTE — TELEPHONE ENCOUNTER
Per office appointment note of 04/10-They can chew one piece of gum every one to two hours for six weeks, with a gradual reduction over a second six weeks, for a total duration of three months  Patient has used 4 refills and is on the patches.    Last ordered 04/10.  OK to refill for same amount again, or should patient be weaning down?    Nicotine gum 2 mg     Last Written Prescription Date: 04/10/17  Last Fill Quantity: 60, # refills: 3  Last Office Visit with NINFA, KHANH or [facility] prescribing provider: 04/17/17        BP Readings from Last 3 Encounters:   06/13/17 125/80   04/17/17 108/62   04/10/17 112/66     Discussed with Kimberly Navarro and approval given to refill again for Qty of 60 with 3 additional refills.  Order hiren Scott RN

## 2017-06-15 NOTE — TELEPHONE ENCOUNTER
Message from MyChart:  Original authorizing provider: YEN Guo CNP would like a refill of the following medications:  nicotine polacrilex (CVS NICOTINE POLACRILEX) 2 MG gum [YEN Guo CNP]    Preferred pharmacy: [facility] - [place], [place] - [address] [place] [address]    Comment:

## 2017-07-06 DIAGNOSIS — K21.9 GASTROESOPHAGEAL REFLUX DISEASE WITHOUT ESOPHAGITIS: ICD-10-CM

## 2017-07-06 RX ORDER — OMEPRAZOLE 40 MG/1
40 CAPSULE, DELAYED RELEASE ORAL DAILY
Qty: 90 CAPSULE | Refills: 3 | Status: CANCELLED | OUTPATIENT
Start: 2017-07-06

## 2017-07-06 NOTE — TELEPHONE ENCOUNTER
Omeprazole 40mg      Last Written Prescription Date: 07/18/2016  Last Fill Quantity: 90,  # refills: 3   Last Office Visit with [facility], [facility] or [facility] prescribing provider: 04/17/2017      Zenia Garcia  [facility]  [phone]

## 2017-07-11 ENCOUNTER — TELEPHONE (OUTPATIENT)
Dept: PEDIATRICS | Facility: CLINIC | Age: 27
End: 2017-07-11

## 2017-07-11 NOTE — TELEPHONE ENCOUNTER
Panel Management Review      Patient has the following on her problem list:     Depression / Dysthymia review  PHQ-9 SCORE 7/18/2016 4/10/2017   Total Score 6 0      Patient is due for:  None      Composite cancer screening  Chart review shows that this patient is due/due soon for the following Pap Smear  Summary:    Patient is due/failing the following:   ACT, PAP and PHYSICAL    Action needed:   Patient needs office visit for pap & physical.    Type of outreach:    Review of Care Everywhere shows last pap at [facility] 7/8/15 - NIL.    Questions for provider review:    None                                                                                   Magi Sanchez CMA    Chart closed .

## 2017-07-15 ENCOUNTER — HOSPITAL ENCOUNTER (INPATIENT)
Facility: CLINIC | Age: 27
LOS: 2 days | Discharge: HOME | DRG: 881 | End: 2017-07-17
Attending: PSYCHIATRY & NEUROLOGY | Admitting: PSYCHIATRY & NEUROLOGY
Payer: COMMERCIAL

## 2017-07-15 DIAGNOSIS — F41.8 DEPRESSION WITH ANXIETY: ICD-10-CM

## 2017-07-15 DIAGNOSIS — F43.10 PTSD (POST-TRAUMATIC STRESS DISORDER): ICD-10-CM

## 2017-07-15 PROBLEM — R45.851 SUICIDE IDEATION: Status: ACTIVE | Noted: 2017-07-15

## 2017-07-15 LAB
AMPHETAMINES UR QL SCN: ABNORMAL
BARBITURATES UR QL: ABNORMAL
BENZODIAZ UR QL: ABNORMAL
CANNABINOIDS UR QL SCN: ABNORMAL
COCAINE UR QL: ABNORMAL
ETHANOL UR QL SCN: ABNORMAL
HCG UR QL: NEGATIVE
OPIATES UR QL SCN: ABNORMAL

## 2017-07-15 PROCEDURE — 80307 DRUG TEST PRSMV CHEM ANLYZR: CPT | Performed by: PSYCHIATRY & NEUROLOGY

## 2017-07-15 PROCEDURE — 99285 EMERGENCY DEPT VISIT HI MDM: CPT | Mod: Z6 | Performed by: PSYCHIATRY & NEUROLOGY

## 2017-07-15 PROCEDURE — 90791 PSYCH DIAGNOSTIC EVALUATION: CPT

## 2017-07-15 PROCEDURE — 99285 EMERGENCY DEPT VISIT HI MDM: CPT | Mod: 25 | Performed by: PSYCHIATRY & NEUROLOGY

## 2017-07-15 PROCEDURE — 81025 URINE PREGNANCY TEST: CPT | Performed by: PSYCHIATRY & NEUROLOGY

## 2017-07-15 PROCEDURE — 25000132 ZZH RX MED GY IP 250 OP 250 PS 637: Performed by: PSYCHIATRY & NEUROLOGY

## 2017-07-15 PROCEDURE — 80320 DRUG SCREEN QUANTALCOHOLS: CPT | Performed by: PSYCHIATRY & NEUROLOGY

## 2017-07-15 PROCEDURE — 12400007 ZZH R&B MH INTERMEDIATE UMMC

## 2017-07-15 RX ORDER — NICOTINE 21 MG/24HR
1 PATCH, TRANSDERMAL 24 HOURS TRANSDERMAL EVERY 24 HOURS
Status: DISCONTINUED | OUTPATIENT
Start: 2017-07-15 | End: 2017-07-17 | Stop reason: HOSPADM

## 2017-07-15 RX ORDER — IBUPROFEN 200 MG
200 TABLET ORAL EVERY 6 HOURS PRN
Status: DISCONTINUED | OUTPATIENT
Start: 2017-07-15 | End: 2017-07-17 | Stop reason: HOSPADM

## 2017-07-15 RX ORDER — ACETAMINOPHEN 325 MG/1
650 TABLET ORAL EVERY 4 HOURS PRN
Status: DISCONTINUED | OUTPATIENT
Start: 2017-07-15 | End: 2017-07-17 | Stop reason: HOSPADM

## 2017-07-15 RX ADMIN — ACETAMINOPHEN 650 MG: 325 TABLET, FILM COATED ORAL at 22:44

## 2017-07-15 RX ADMIN — NICOTINE 1 PATCH: 21 PATCH, EXTENDED RELEASE TRANSDERMAL at 22:43

## 2017-07-15 ASSESSMENT — ENCOUNTER SYMPTOMS
CHEST TIGHTNESS: 0
DYSPHORIC MOOD: 1
NERVOUS/ANXIOUS: 1
ABDOMINAL PAIN: 0
HALLUCINATIONS: 0
BACK PAIN: 0
DIZZINESS: 0
SHORTNESS OF BREATH: 0
FEVER: 0

## 2017-07-15 ASSESSMENT — ACTIVITIES OF DAILY LIVING (ADL)
DRESS: INDEPENDENT
AMBULATION: 0-->INDEPENDENT
RETIRED_EATING: 0-->INDEPENDENT
TOILETING: 0-->INDEPENDENT
TRANSFERRING: 0-->INDEPENDENT
FALL_HISTORY_WITHIN_LAST_SIX_MONTHS: NO
GROOMING: INDEPENDENT
BATHING: 0-->INDEPENDENT
SWALLOWING: 0-->SWALLOWS FOODS/LIQUIDS WITHOUT DIFFICULTY
LAUNDRY: WITH SUPERVISION
RETIRED_COMMUNICATION: 0-->UNDERSTANDS/COMMUNICATES WITHOUT DIFFICULTY
ORAL_HYGIENE: INDEPENDENT
COGNITION: 0 - NO COGNITION ISSUES REPORTED
DRESS: 0-->INDEPENDENT

## 2017-07-15 NOTE — IP AVS SNAPSHOT
MRN:[id number]                      After Visit Summary   7/15/2017    Nhung Concepcion    MRN: [id number]           Thank you!     Thank you for choosing [facility] for your care. Our goal is always to provide you with excellent care.        Patient Information     Date Of Birth          1990        Designated Caregiver       Most Recent Value    Caregiver    Will someone help with your care after discharge? no      About your hospital stay     You were admitted on:  July 15, 2017 You last received care in the:  [facility]    You were discharged on:  July 17, 2017       Who to Call     For medical emergencies, please call 911.  For non-urgent questions about your medical care, please call your primary care provider or clinic, [phone]          Attending Provider     Provider Specialty    Devan Henning MD Emergency Medicine    Ruslan, Jerry August MD Psychiatry       Primary Care Provider Office Phone # Fax #    Kimberly Navarro YEN [facility] [phone]      Further instructions from your care team       Behavioral Discharge Planning and Instructions      Summary:  You were admitted on 7/15/2017 for Depression and Suicidal Ideations.  You were treated by Dr. Jerry Mercer MD and discharged on 7/17/2017 from [facility]      Main Diagnosis: Major Depressive Disorder      Health Care Follow-up Appointments:       Friday August 4, 2017 - 8:40am   Psychiatry  Provider: Laurie Conroy M.A, MSN  Address: [place] [address] phone: [phone] Fax: [phone]  Therapist - Alia Llanes - continue with your therapist at the above clinic as well.    Attend all scheduled appointments with your outpatient providers. Call at least 24 hours in advance if you need to reschedule an appointment to ensure continued access to your outpatient providers.   Major Treatments, Procedures and Findings:  You were  provided with: a psychiatric assessment, assessed for medical stability, medication evaluation and/or management, group therapy and milieu management    Symptoms to Report: feeling more aggressive, increased confusion, losing more sleep, mood getting worse or thoughts of suicide    Early warning signs can include: increased depression or anxiety sleep disturbances increased thoughts or behaviors of suicide or self-harm  increased unusual thinking, such as paranoia or hearing voices    Safety and Wellness:  Take all medicines as directed.  Make no changes unless your doctor suggests them.      Follow treatment recommendations.  Refrain from alcohol and non-prescribed drugs.     Resources:   Crisis Intervention: 146.287.3907 or 430-216-1067 (TTY: 706.651.9244).  Call anytime for help.  National [employer] on Mental Illness (www.mn.monalisa.org): 539.559.9609 or 287-158-1836.  Suicide Awareness Voices of Education (SAVE) (www.save.org): 965-875-WEJS (0183)  National Suicide Prevention Line (www.mentalhealthmn.org): 763-984-RVZR (6401)  Mental Health Consumer/Survivor Network of [place] (www.mhcsn.net): 835.433.1492 or 047-121-3461  Mental Health Association of [place] (www.mentalhealth.org): 910.562.4005 or 825-334-6319  [facility] Crisis Response [phone]    The treatment team has appreciated the opportunity to work with you.     If you have any questions or concerns our unit number is [phone]    Pending Results     No orders found from 7/13/2017 to 7/16/2017.            Statement of Approval     Ordered          07/17/17 1244  I have reviewed and agree with all the recommendations and orders detailed in this document.  EFFECTIVE NOW     Approved and electronically signed by:  Morris Palomo MD             Admission Information     Date & Time Provider Department Dept. Phone    7/15/2017 Jerry Mercer MD  [facility] [phone]      Your Vitals Were     Blood Pressure Pulse Temperature Respirations Height Weight     "131/65 70 98.4  F (36.9  C) (Oral) 16 1.651 m (5' 5\") 97.5 kg (215 lb)    Last Period Pulse Oximetry BMI (Body Mass Index)             07/14/2017 97% 35.78 kg/m2         [employer] Information     [employer] gives you secure access to your electronic health record. If you see a primary care provider, you can also send messages to your care team and make appointments. If you have questions, please call your primary care clinic.  If you do not have a primary care provider, please call [phone] and they will assist you.        Care EveryWhere ID     This is your Care EveryWhere ID. This could be used by other organizations to access your [facility] medical records  [id number]        Equal Access to Services     DANO LARIOS : Kathryn Singh, faina meng, betty moreno, steven miller. So [facility] [phone].    ATENCIÓN: Si habla español, tiene a arboleda disposición servicios gratuitos de asistencia lingüística. Llame al [phone].    We comply with applicable federal civil rights laws and [place] laws. We do not discriminate on the basis of race, color, national origin, age, disability sex, sexual orientation or gender identity.               Review of your medicines      START taking        Dose / Directions    diphenhydrAMINE 25 MG capsule   Commonly known as:  BENADRYL   Used for:  Depression with anxiety        Dose:  25 mg   Take 1 capsule (25 mg) by mouth 2 times daily as needed for other (for anxiety or insomnia.)   Quantity:  60 capsule   Refills:  0         CONTINUE these medicines which have NOT CHANGED        Dose / Directions    FLUoxetine 20 MG capsule   Commonly known as:  PROzac   Used for:  Depression with anxiety        Dose:  20 mg   Take 1 capsule (20 mg) by mouth daily   Quantity:  90 capsule   Refills:  1       * nicotine 14 MG/24HR 24 hr patch   Commonly known as:  NICODERM CQ   Used for:  Encounter for smoking cessation counseling, Tobacco " use disorder        Dose:  1 patch   Place 1 patch onto the skin every 24 hours   Quantity:  30 patch   Refills:  1       * nicotine 21 MG/24HR 24 hr patch   Commonly known as:  NICODERM CQ   Used for:  Tobacco abuse        Dose:  1 patch   Place 1 patch onto the skin every 24 hours   Quantity:  30 patch   Refills:  3       nicotine polacrilex 2 MG gum   Commonly known as:  [employer] NICOTINE POLACRILEX   Used for:  Tobacco abuse        Dose:  2 mg   Place 1 each (2 mg) inside cheek as needed for smoking cessation   Quantity:  60 tablet   Refills:  3       omeprazole 40 MG capsule   Commonly known as:  priLOSEC   Used for:  Gastroesophageal reflux disease without esophagitis        Dose:  40 mg   Take 1 capsule (40 mg) by mouth daily Take 30-60 minutes before a meal.   Quantity:  90 capsule   Refills:  3       * Notice:  This list has 2 medication(s) that are the same as other medications prescribed for you. Read the directions carefully, and ask your doctor or other care provider to review them with you.         Where to get your medicines      These medications were sent to [facility] [address]  [address] [address]     Phone:  [phone]     diphenhydrAMINE 25 MG capsule                Protect others around you: Learn how to safely use, store and throw away your medicines at www.disposemymeds.org.             Medication List: This is a list of all your medications and when to take them. Check marks below indicate your daily home schedule. Keep this list as a reference.      Medications           Morning Afternoon Evening Bedtime As Needed    diphenhydrAMINE 25 MG capsule   Commonly known as:  BENADRYL   Take 1 capsule (25 mg) by mouth 2 times daily as needed for other (for anxiety or insomnia.)   Last time this was given:  25 mg on 7/16/2017  6:04 PM                                FLUoxetine 20 MG capsule   Commonly known as:  PROzac   Take 1 capsule  (20 mg) by mouth daily   Last time this was given:  60 mg on 7/17/2017  8:49 AM                                * nicotine 14 MG/24HR 24 hr patch   Commonly known as:  NICODERM CQ   Place 1 patch onto the skin every 24 hours                                * nicotine 21 MG/24HR 24 hr patch   Commonly known as:  NICODERM CQ   Place 1 patch onto the skin every 24 hours   Last time this was given:  1 patch on 7/17/2017  8:52 AM                                nicotine polacrilex 2 MG gum   Commonly known as:  [employer] NICOTINE POLACRILEX   Place 1 each (2 mg) inside cheek as needed for smoking cessation   Last time this was given:  2 mg on 7/17/2017 12:38 PM                                omeprazole 40 MG capsule   Commonly known as:  priLOSEC   Take 1 capsule (40 mg) by mouth daily Take 30-60 minutes before a meal.   Last time this was given:  40 mg on 7/17/2017  8:49 AM                                * Notice:  This list has 2 medication(s) that are the same as other medications prescribed for you. Read the directions carefully, and ask your doctor or other care provider to review them with you.

## 2017-07-15 NOTE — IP AVS SNAPSHOT
[address] [address]    Phone:  [phone]                                       After Visit Summary   7/15/2017    Nhung Concepcion    MRN: [id number]           After Visit Summary Signature Page     I have received my discharge instructions, and my questions have been answered. I have discussed any challenges I see with this plan with the nurse or doctor.    ..........................................................................................................................................  Patient/Patient Representative Signature      ..........................................................................................................................................  Patient Representative Print Name and Relationship to Patient    ..................................................               ................................................  Date                                            Time    ..........................................................................................................................................  Reviewed by Signature/Title    ...................................................              ..............................................  Date                                                            Time

## 2017-07-16 LAB
ALBUMIN SERPL-MCNC: 3.6 G/DL (ref 3.4–5)
ALP SERPL-CCNC: 73 U/L (ref 40–150)
ALT SERPL W P-5'-P-CCNC: 23 U/L (ref 0–50)
ANION GAP SERPL CALCULATED.3IONS-SCNC: 10 MMOL/L (ref 3–14)
AST SERPL W P-5'-P-CCNC: 18 U/L (ref 0–45)
BASOPHILS # BLD AUTO: 0 10E9/L (ref 0–0.2)
BASOPHILS NFR BLD AUTO: 0.3 %
BILIRUB SERPL-MCNC: 0.4 MG/DL (ref 0.2–1.3)
BUN SERPL-MCNC: 10 MG/DL (ref 7–30)
CALCIUM SERPL-MCNC: 8.5 MG/DL (ref 8.5–10.1)
CHLORIDE SERPL-SCNC: 107 MMOL/L (ref 94–109)
CHOLEST SERPL-MCNC: 180 MG/DL
CO2 SERPL-SCNC: 25 MMOL/L (ref 20–32)
CREAT SERPL-MCNC: 0.83 MG/DL (ref 0.52–1.04)
DIFFERENTIAL METHOD BLD: NORMAL
EOSINOPHIL # BLD AUTO: 0.1 10E9/L (ref 0–0.7)
EOSINOPHIL NFR BLD AUTO: 1.3 %
ERYTHROCYTE [DISTWIDTH] IN BLOOD BY AUTOMATED COUNT: 13 % (ref 10–15)
FOLATE SERPL-MCNC: 20 NG/ML
GFR SERPL CREATININE-BSD FRML MDRD: 81 ML/MIN/1.7M2
GLUCOSE SERPL-MCNC: 96 MG/DL (ref 70–99)
HCT VFR BLD AUTO: 36.3 % (ref 35–47)
HDLC SERPL-MCNC: 42 MG/DL
HGB BLD-MCNC: 12 G/DL (ref 11.7–15.7)
IMM GRANULOCYTES # BLD: 0 10E9/L (ref 0–0.4)
IMM GRANULOCYTES NFR BLD: 0.2 %
LDLC SERPL CALC-MCNC: 112 MG/DL
LYMPHOCYTES # BLD AUTO: 2.6 10E9/L (ref 0.8–5.3)
LYMPHOCYTES NFR BLD AUTO: 42.3 %
MCH RBC QN AUTO: 30.1 PG (ref 26.5–33)
MCHC RBC AUTO-ENTMCNC: 33.1 G/DL (ref 31.5–36.5)
MCV RBC AUTO: 91 FL (ref 78–100)
MONOCYTES # BLD AUTO: 0.4 10E9/L (ref 0–1.3)
MONOCYTES NFR BLD AUTO: 6 %
NEUTROPHILS # BLD AUTO: 3.1 10E9/L (ref 1.6–8.3)
NEUTROPHILS NFR BLD AUTO: 49.9 %
NONHDLC SERPL-MCNC: 138 MG/DL
NRBC # BLD AUTO: 0 10*3/UL
NRBC BLD AUTO-RTO: 0 /100
PLATELET # BLD AUTO: 243 10E9/L (ref 150–450)
POTASSIUM SERPL-SCNC: 3.6 MMOL/L (ref 3.4–5.3)
PROT SERPL-MCNC: 6.8 G/DL (ref 6.8–8.8)
RBC # BLD AUTO: 3.99 10E12/L (ref 3.8–5.2)
SODIUM SERPL-SCNC: 142 MMOL/L (ref 133–144)
TRIGL SERPL-MCNC: 131 MG/DL
TSH SERPL DL<=0.005 MIU/L-ACNC: 1.01 MU/L (ref 0.4–4)
VIT B12 SERPL-MCNC: 491 PG/ML (ref 193–986)
WBC # BLD AUTO: 6.1 10E9/L (ref 4–11)

## 2017-07-16 PROCEDURE — 80053 COMPREHEN METABOLIC PANEL: CPT | Performed by: PSYCHIATRY & NEUROLOGY

## 2017-07-16 PROCEDURE — 82746 ASSAY OF FOLIC ACID SERUM: CPT | Performed by: PSYCHIATRY & NEUROLOGY

## 2017-07-16 PROCEDURE — 85025 COMPLETE CBC W/AUTO DIFF WBC: CPT | Performed by: PSYCHIATRY & NEUROLOGY

## 2017-07-16 PROCEDURE — 25000132 ZZH RX MED GY IP 250 OP 250 PS 637: Performed by: PSYCHIATRY & NEUROLOGY

## 2017-07-16 PROCEDURE — 99223 1ST HOSP IP/OBS HIGH 75: CPT | Mod: GC | Performed by: PSYCHIATRY & NEUROLOGY

## 2017-07-16 PROCEDURE — 82607 VITAMIN B-12: CPT | Performed by: PSYCHIATRY & NEUROLOGY

## 2017-07-16 PROCEDURE — 84443 ASSAY THYROID STIM HORMONE: CPT | Performed by: PSYCHIATRY & NEUROLOGY

## 2017-07-16 PROCEDURE — 36415 COLL VENOUS BLD VENIPUNCTURE: CPT | Performed by: PSYCHIATRY & NEUROLOGY

## 2017-07-16 PROCEDURE — 12400007 ZZH R&B MH INTERMEDIATE UMMC

## 2017-07-16 PROCEDURE — 80061 LIPID PANEL: CPT | Performed by: PSYCHIATRY & NEUROLOGY

## 2017-07-16 RX ORDER — DIPHENHYDRAMINE HCL 25 MG
25 CAPSULE ORAL 2 TIMES DAILY PRN
Status: DISCONTINUED | OUTPATIENT
Start: 2017-07-16 | End: 2017-07-17 | Stop reason: HOSPADM

## 2017-07-16 RX ORDER — POLYETHYLENE GLYCOL 3350 17 G/17G
17 POWDER, FOR SOLUTION ORAL DAILY PRN
Status: DISCONTINUED | OUTPATIENT
Start: 2017-07-16 | End: 2017-07-17 | Stop reason: HOSPADM

## 2017-07-16 RX ADMIN — FLUOXETINE 20 MG: 20 CAPSULE ORAL at 10:33

## 2017-07-16 RX ADMIN — DIPHENHYDRAMINE HYDROCHLORIDE 25 MG: 25 CAPSULE ORAL at 18:04

## 2017-07-16 RX ADMIN — NICOTINE POLACRILEX 2 MG: 2 GUM, CHEWING ORAL at 18:04

## 2017-07-16 RX ADMIN — OMEPRAZOLE 40 MG: 20 CAPSULE, DELAYED RELEASE ORAL at 10:32

## 2017-07-16 ASSESSMENT — ACTIVITIES OF DAILY LIVING (ADL)
ORAL_HYGIENE: INDEPENDENT
GROOMING: INDEPENDENT
ORAL_HYGIENE: INDEPENDENT
LAUNDRY: WITH SUPERVISION
DRESS: INDEPENDENT
DRESS: INDEPENDENT
LAUNDRY: WITH SUPERVISION
GROOMING: INDEPENDENT
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
## 2017-07-16 NOTE — PROGRESS NOTES
Pt was observed making phone calls in the early morning, otherwise isolative and withdrawn to her room. Pt denies SI/SIB, hallucinations, and medication side affects. Pt affect was blunted /flat, she stated she felt numb, and depressed. Pt did not attend groups, and did not interact with peers. Pt was also visited by her sister.      07/16/17 1225   Behavioral Health   Hallucinations denies / not responding to hallucinations   Thinking distractable;poor concentration   Orientation person: oriented;date: oriented;place: oriented   Memory baseline memory   Insight insight appropriate to situation   Judgement impaired   Eye Contact at examiner   Affect blunted, flat   Mood depressed   Physical Appearance/Attire attire appropriate to age and situation   Hygiene well groomed   Suicidality other (see comments)  (denies )   Self Injury other (see comment)  (denies )   Elopement (None stated/ none observed )   Activity isolative;withdrawn   Speech coherent   Medication Sensitivity no stated side effects;no observed side effects   Psychomotor / Gait balanced;steady   Psycho Education   Type of Intervention 1:1 intervention   Response participates with encouragement   Hours 0.5   Treatment Detail check in    Activities of Daily Living   Hygiene/Grooming independent   Oral Hygiene independent   Dress independent   Laundry with supervision   Room Organization independent   Activity   Activity Level of Assistance independent

## 2017-07-16 NOTE — PLAN OF CARE
Problem: General Plan of Care (Inpatient Behavioral)  Goal: Individualization/Patient Specific Goal (IP Behavioral)  The patient and/or their representative will achieve their patient-specific goals related to the plan of care.    The patient-specific goals include:  Illness Management Recovery model: Objectives     Patient will identify reason(s) for hospitalization from their perspective.  Patient will identify a minimum of three goals for discharge.  Patient will identify a minimum of three triggers that may increase their symptoms.  Patient will identify a minimum of three coping skills they can do to stay well.  Patient will identify their support system to demonstrate readiness for discharge.
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
## 2017-07-17 VITALS
OXYGEN SATURATION: 97 % | SYSTOLIC BLOOD PRESSURE: 131 MMHG | HEIGHT: 65 IN | WEIGHT: 215 LBS | BODY MASS INDEX: 35.82 KG/M2 | DIASTOLIC BLOOD PRESSURE: 65 MMHG | HEART RATE: 70 BPM | RESPIRATION RATE: 16 BRPM | TEMPERATURE: 98.4 F

## 2017-07-17 PROCEDURE — 25000132 ZZH RX MED GY IP 250 OP 250 PS 637: Performed by: PSYCHIATRY & NEUROLOGY

## 2017-07-17 PROCEDURE — 90853 GROUP PSYCHOTHERAPY: CPT

## 2017-07-17 PROCEDURE — 99239 HOSP IP/OBS DSCHRG MGMT >30: CPT | Mod: GC | Performed by: PSYCHIATRY & NEUROLOGY

## 2017-07-17 RX ORDER — DIPHENHYDRAMINE HCL 25 MG
25 CAPSULE ORAL 2 TIMES DAILY PRN
Qty: 60 CAPSULE | Refills: 0 | Status: SHIPPED | OUTPATIENT
Start: 2017-07-17 | End: 2018-05-29

## 2017-07-17 RX ADMIN — FLUOXETINE 60 MG: 20 CAPSULE ORAL at 08:49

## 2017-07-17 RX ADMIN — NICOTINE 1 PATCH: 21 PATCH, EXTENDED RELEASE TRANSDERMAL at 08:52

## 2017-07-17 RX ADMIN — NICOTINE POLACRILEX 2 MG: 2 GUM, CHEWING ORAL at 12:38

## 2017-07-17 RX ADMIN — OMEPRAZOLE 40 MG: 20 CAPSULE, DELAYED RELEASE ORAL at 08:49

## 2017-07-17 ASSESSMENT — ACTIVITIES OF DAILY LIVING (ADL)
GROOMING: INDEPENDENT
ORAL_HYGIENE: INDEPENDENT
DRESS: INDEPENDENT
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
## 2017-07-17 NOTE — DISCHARGE INSTRUCTIONS
Behavioral Discharge Planning and Instructions      Summary:  You were admitted on 7/15/2017 for Depression and Suicidal Ideations.  You were treated by Dr. Jerry Mercer MD and discharged on 7/17/2017 from [facility] to home      Main Diagnosis: Major Depressive Disorder      Health Care Follow-up Appointments:       Friday August 4, 2017 - 8:40am   Psychiatry  Provider: Laurie Conroy M.A, MSN  Address: [place] [address] phone: [phone] Fax: [phone]  Therapist - Alia Llanes - continue with your therapist at the above clinic as well.    Attend all scheduled appointments with your outpatient providers. Call at least 24 hours in advance if you need to reschedule an appointment to ensure continued access to your outpatient providers.   Major Treatments, Procedures and Findings:  You were provided with: a psychiatric assessment, assessed for medical stability, medication evaluation and/or management, group therapy and milieu management    Symptoms to Report: feeling more aggressive, increased confusion, losing more sleep, mood getting worse or thoughts of suicide    Early warning signs can include: increased depression or anxiety sleep disturbances increased thoughts or behaviors of suicide or self-harm  increased unusual thinking, such as paranoia or hearing voices    Safety and Wellness:  Take all medicines as directed.  Make no changes unless your doctor suggests them.      Follow treatment recommendations.  Refrain from alcohol and non-prescribed drugs.     Resources:   Crisis Intervention: 399.240.3238 or 140-012-2589 (TTY: 731.496.9956).  Call anytime for help.  National [place] on Mental Illness (www.mn.monalisa.org): 526.551.2256 or 933-191-9813.  Suicide Awareness Voices of Education (SAVE) (www.save.org): 374-527-ITVO (6926)  National Suicide Prevention Line (www.mentalhealthmn.org): 097-668-AUES (3974)  Mental Health  Consumer/Survivor Network of [place] (www.mhcsn.net): 542-968-9795 or 087-985-3264  Mental Health Association [place] (www.mentalhealth.org): 297.494.2339 or 146-509-8768  [facility] Crisis Response [phone]    The treatment team has appreciated the opportunity to work with you.     If you have any questions or concerns our unit number is [phone]

## 2017-07-17 NOTE — PROGRESS NOTES
Pt was isolative to her room and withdrawn. Pt denies SI/SIB, hallucinations, and medication side affects. Pt was visited by family, had a pleasant visit observed smiling and talkative. Pt did not attend group, and was not social with peers.      07/16/17 2129   Behavioral Health   Hallucinations denies / not responding to hallucinations   Thinking distractable;poor concentration   Orientation person: oriented;place: oriented;date: oriented;time: oriented   Memory baseline memory   Insight insight appropriate to situation   Judgement impaired   Eye Contact at examiner   Affect blunted, flat   Mood depressed   Physical Appearance/Attire attire appropriate to age and situation   Hygiene well groomed   Suicidality other (see comments)  (denies )   Self Injury other (see comment)  (denies )   Elopement (none stated/ none observed )   Activity isolative;withdrawn   Speech coherent;clear   Medication Sensitivity no observed side effects;no stated side effects   Psychomotor / Gait balanced;steady   Psycho Education   Type of Intervention 1:1 intervention   Response participates, initiates socially appropriate   Hours 0.5   Treatment Detail check in    Activities of Daily Living   Hygiene/Grooming independent   Oral Hygiene independent   Dress independent   Laundry with supervision   Room Organization independent   Activity   Activity Level of Assistance independent

## 2017-07-17 NOTE — PROGRESS NOTES
Patient discharged to self with a plan to follow up medication management as well as therapy. Discharge AVS reviewed, as well as discharge medications. They have no further questions and are aware to call the unit any time after discharge should further questions arise. IN addition, pt was highlight the number of her [facility] crisis line.     Pt verbalized her dose of prozac at 60mg, even though dc summary said 20mg. Pt also stated she has a sooner appointment for medication management, Monday and 24th at noon.

## 2017-07-18 ENCOUNTER — TELEPHONE (OUTPATIENT)
Dept: PEDIATRICS | Facility: CLINIC | Age: 27
End: 2017-07-18

## 2017-07-18 NOTE — TELEPHONE ENCOUNTER
ED / Discharge Outreach Protocol    Patient Contact    Attempt # 1    Was call answered?  No.  Left message on voicemail with information to call me back.

## 2017-07-18 NOTE — TELEPHONE ENCOUNTER
Please contact patient for In-patient follow up.  [phone] (home)     Visit date: 7/17/17  Diagnosis listed:Ptsd (Post-Traumatic Stress Disorder)  Number of visits in past 12 months:0

## 2017-07-20 NOTE — TELEPHONE ENCOUNTER
ED / Discharge Outreach Protocol    Patient Contact    Attempt # 3    Was call answered?  No.      Leland, RN  Triage Nurse

## 2017-08-10 ENCOUNTER — TELEPHONE (OUTPATIENT)
Dept: OTHER | Facility: CLINIC | Age: 27
End: 2017-08-10

## 2017-08-10 ENCOUNTER — CARE COORDINATION (OUTPATIENT)
Dept: CARE COORDINATION | Facility: CLINIC | Age: 27
End: 2017-08-10

## 2017-08-10 DIAGNOSIS — F32.A DEPRESSION: Primary | ICD-10-CM

## 2017-08-10 NOTE — TELEPHONE ENCOUNTER
Clinical Product Navigator RN reviewed chart; patient on payer product coverage.  Review results: Met referral criteria for Care Coordinator; referral to be sent.    Pt had a recent IP stay for .  Follow up was set up, please assess compliance with follow up and if any additional needs.    Tamar Escalante RN/Clinical Product Navigator

## 2017-08-10 NOTE — PROGRESS NOTES
Clinic Care Coordination Contact  [facility] [[email or web address]]/Voicemail    Referral Source: Other; Clinical Product Navigator  Clinical Data: Care Coordinator Outreach  Outreach attempted x 1.  Left message on voicemail with call back information and requested return call.    Plan: Care Coordinator will try to reach patient again in 1-2 business days.    DULCE Camara, [facility]  Social Work - Care Coordinator  [facility] [place], and [place]  Phone: [phone]

## 2017-08-10 NOTE — LETTER
[facility]  [address]  [place] [address]      August 11, 2017      Nhung Concepcion  [address] [place] [id number]    Dear Nhung,  I am the Clinic Care Coordinator that works with your primary care provider's clinic. I have been trying to reach you recently to introduce Clinic Care Coordination and to see if there was anything I could assist you with.  Below is a description of what Clinic Care Coordination is and how I can further assist you.     The Clinic Care Coordinator role is a Registered Nurse and/or  who understands the health care system. The goal of Clinic Care Coordination is to help you manage your health and improve access to the [facility] in the most efficient manner.  The Registered Nurse can assist you in meeting your health care goals by providing education, coordinating services, and strengthening the communication among your providers. The  can assist you with financial, behavioral, psychosocial, and chemical dependency and counseling/psychiatric resources.    Please feel free to keep this letter and contact information to contact me at [phone] with any further questions or concerns that may arise. We at [facility] are focused on providing you with the highest-quality healthcare experience possible and that all starts with you.       Sincerely,     Samuel Alva

## 2017-08-11 NOTE — PROGRESS NOTES
Clinic Care Coordination Contact    Spoke to pt who stated that she has the resources and services that she needs. Pt declined current clinic care coordination needs. Pt will contact  for future needs.    DULCE Camara, [facility]  Social Work - Care Coordinator  [facility] [place], [place], and [place]  Phone: [phone]

## 2017-08-11 NOTE — PROGRESS NOTES
Clinic Care Coordination Contact  [facility]/Voicemail    Referral Source: Other, specify (Clinical Product Navigator)  Clinical Data: Care Coordinator Outreach  Outreach attempted x 2.  Left message on voicemail with call back information and requested return call.    Plan: Care Coordinator mailed out care coordination introduction letter on 08/11/17. Care Coordinator will try to reach patient again in 3-5 business days.    DULCE Camara, [facility]  Social Work - Care Coordinator  [facility] [place], [place], and [place]  Phone: [phone]

## 2017-08-15 ENCOUNTER — HOSPITAL ENCOUNTER (EMERGENCY)
Facility: CLINIC | Age: 27
Discharge: HOME | End: 2017-08-15
Attending: EMERGENCY MEDICINE | Admitting: EMERGENCY MEDICINE
Payer: COMMERCIAL

## 2017-08-15 VITALS
HEART RATE: 83 BPM | TEMPERATURE: 98.3 F | WEIGHT: 220 LBS | SYSTOLIC BLOOD PRESSURE: 132 MMHG | OXYGEN SATURATION: 99 % | RESPIRATION RATE: 16 BRPM | BODY MASS INDEX: 36.65 KG/M2 | DIASTOLIC BLOOD PRESSURE: 92 MMHG | HEIGHT: 65 IN

## 2017-08-15 DIAGNOSIS — S09.11XA SPRAIN AND STRAIN OF TEMPOROMANDIBULAR JOINT: ICD-10-CM

## 2017-08-15 DIAGNOSIS — S03.40XA SPRAIN AND STRAIN OF TEMPOROMANDIBULAR JOINT: ICD-10-CM
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
## 2017-08-15 PROCEDURE — 99282 EMERGENCY DEPT VISIT SF MDM: CPT | Performed by: EMERGENCY MEDICINE

## 2017-08-15 PROCEDURE — 99282 EMERGENCY DEPT VISIT SF MDM: CPT | Mod: Z6 | Performed by: EMERGENCY MEDICINE

## 2017-08-15 ASSESSMENT — ENCOUNTER SYMPTOMS
SHORTNESS OF BREATH: 0
ABDOMINAL PAIN: 0
FEVER: 0

## 2017-08-15 NOTE — ED AVS SNAPSHOT
[facility], Emergency Department    [address] [address]    Phone:  [phone]                                       Nhung Concepcion   MRN: [id number]    Department:  [facility], Emergency Department   Date of Visit:  8/15/2017           Patient Information     Date Of Birth          1990        Your diagnoses for this visit were:     Sprain and strain of temporomandibular joint        You were seen by Carlo Munoz MD.        Discharge Instructions       Please make an appointment to follow up with your dentist as soon as possible.     Ibuprofen 400 mg every 8 hours with food.    Soft food.    Return if worse pain or swelling.      24 Hour Appointment Hotline       To make an appointment at any [facility] clinic, call [phone] ([phone]). If you don't have a family doctor or clinic, we will help you find one. [facility] clinics are conveniently located to serve the needs of you and your family.             Review of your medicines      Our records show that you are taking the medicines listed below. If these are incorrect, please call your family doctor or clinic.        Dose / Directions Last dose taken    diphenhydrAMINE 25 MG capsule   Commonly known as:  BENADRYL   Dose:  25 mg   Quantity:  60 capsule        Take 1 capsule (25 mg) by mouth 2 times daily as needed for other (for anxiety or insomnia.)   Refills:  0        FLUoxetine 20 MG capsule   Commonly known as:  PROzac   Dose:  20 mg   Quantity:  90 capsule        Take 1 capsule (20 mg) by mouth daily   Refills:  1        * nicotine 14 MG/24HR 24 hr patch   Commonly known as:  NICODERM CQ   Dose:  1 patch   Quantity:  30 patch        Place 1 patch onto the skin every 24 hours   Refills:  1        * nicotine 21 MG/24HR 24 hr patch   Commonly known as:  NICODERM CQ   Dose:  1 patch   Quantity:  30 patch        Place 1 patch onto the skin every 24 hours   Refills:  3        nicotine polacrilex 2 MG gum   Commonly  known as:  [employer] NICOTINE POLACRILEX   Dose:  2 mg   Quantity:  60 tablet        Place 1 each (2 mg) inside cheek as needed for smoking cessation   Refills:  3        omeprazole 40 MG capsule   Commonly known as:  priLOSEC   Dose:  40 mg   Quantity:  90 capsule        Take 1 capsule (40 mg) by mouth daily Take 30-60 minutes before a meal.   Refills:  3        * Notice:  This list has 2 medication(s) that are the same as other medications prescribed for you. Read the directions carefully, and ask your doctor or other care provider to review them with you.            Orders Needing Specimen Collection     None      Pending Results     No orders found from 8/13/2017 to 8/16/2017.            Pending Culture Results     No orders found from 8/13/2017 to 8/16/2017.            Pending Results Instructions     If you had any lab results that were not finalized at the time of your Discharge, you can call the ED Lab Result RN at [phone]. You will be contacted by this team for any positive Lab results or changes in treatment. The nurses are available 7 days a week from 10A to 6:30P.  You can leave a message 24 hours per day and they will return your call.        Thank you for choosing [facility]       Thank you for choosing [facility] for your care. Our goal is always to provide you with excellent care. Hearing back from our patients is one way we can continue to improve our services. Please take a few minutes to complete the written survey that you may receive in the mail after you visit with us. Thank you!        [facility] Information     [facility] gives you secure access to your electronic health record. If you see a primary care provider, you can also send messages to your care team and make appointments. If you have questions, please call your primary care clinic.  If you do not have a primary care provider, please call [phone] and they will assist you.        Care EveryWhere ID     This is your Care EveryWhere ID. This  could be used by other organizations to access your [facility] medical records  [id number]        Equal Access to Services     DANO LARIOS : Hadii masood Singh, faina meng, steven morrell. So [facility] [phone].    ATENCIÓN: Si habla español, tiene a arboleda disposición servicios gratuitos de asistencia lingüística. Llame al [phone].    We comply with applicable federal civil rights laws and [place] laws. We do not discriminate on the basis of race, color, national origin, age, disability sex, sexual orientation or gender identity.            After Visit Summary       This is your record. Keep this with you and show to your community pharmacist(s) and doctor(s) at your next visit.

## 2017-08-15 NOTE — ED PROVIDER NOTES
History     Chief Complaint   Patient presents with     Otalgia     Pt reports L ear pain since this am. Denies changes in hearing.     HPI  Nhung Concepcion is a 27 year old female with a history of depression and GERD who presents to the Emergency Department for evaluation of otalgia. The patient reports she woke up this morning with acute onset of sharp left ear pain. She denies any history of ear problems in the past. She does state she had a cold last week, but those symptoms are now resolved.  She has had her wisdom teeth out years ago.  No recent dental work.  She has not noticed any popping in her jaw but did feel like her teeth didn't quite fit together when she woke up this morning.  She denies any trauma and had no recent swimming or diving.    Past Medical History:   Diagnosis Date     Anxiety      Depressive disorder        Past Surgical History:   Procedure Laterality Date     ORTHOPEDIC SURGERY      right arm surgery       Family History   Problem Relation Age of Onset     Breast Cancer Maternal Grandmother        Social History   Substance Use Topics     Smoking status: Current Every Day Smoker     Types: Cigarettes     Smokeless tobacco: Never Used     Alcohol use Yes      Comment: 2 times a week       No current facility-administered medications for this encounter.      Current Outpatient Prescriptions   Medication     diphenhydrAMINE (BENADRYL) 25 MG capsule     omeprazole (PRILOSEC) 40 MG capsule     nicotine polacrilex ([employer] NICOTINE POLACRILEX) 2 MG gum     nicotine (NICODERM CQ) 21 MG/24HR 24 hr patch     nicotine (NICODERM CQ) 14 MG/24HR patch 2h hr     FLUoxetine (PROZAC) 20 MG capsule        Allergies   Allergen Reactions     Wellbutrin [Bupropion]      History of seizure activity while treated with Wellbutrin     Sulfa Drugs Rash         I have reviewed the Medications, Allergies, Past Medical and Surgical History, and Social History in the Epic system.    Review of Systems   Constitutional:  "Negative for fever.   HENT: Positive for ear pain (left).    Respiratory: Negative for shortness of breath.    Cardiovascular: Negative for chest pain.   Gastrointestinal: Negative for abdominal pain.   All other systems reviewed and are negative.      Physical Exam   BP: 139/88  Pulse: 83  Temp: 98.3  F (36.8  C)  Resp: 16  Height: 165.1 cm (5' 5\")  Weight: 99.8 kg (220 lb)  SpO2: 97 %  Physical Exam   Constitutional: She is oriented to person, place, and time. She appears well-developed and well-nourished. No distress.   HENT:   Head: Normocephalic and atraumatic.   Right Ear: Tympanic membrane and ear canal normal.   Left Ear: Tympanic membrane and ear canal normal. No drainage, swelling or tenderness. No mastoid tenderness. Tympanic membrane is not injected and not scarred.  No middle ear effusion.   Mouth/Throat: No oral lesions. No trismus in the jaw. No dental abscesses, uvula swelling or lacerations. No tonsillar abscesses.   Eyes: No scleral icterus.   Neck: Normal range of motion. Neck supple.   Neurological: She is alert and oriented to person, place, and time.   Skin: Skin is warm and dry. No rash noted. She is not diaphoretic. No erythema. No pallor.       ED Course     ED Course     Procedures             Critical Care time:  none             Labs Ordered and Resulted from Time of ED Arrival Up to the Time of Departure from the ED - No data to display         Assessments & Plan (with Medical Decision Making)   The patient has pain just in front of her left ear canal.  The canal and TM appear normal with no signs of infection.  Her mastoid is nontender.  She has good range of motion but pain reproduced by opening her jaw wide.  She may have strained her TMJ during the night, possibly with yawning.  She has no symptoms of tooth pain or swelling to suggest other odontogenic etiology.    I have reviewed the nursing notes.    I have reviewed the findings, diagnosis, plan and need for follow up with the " patient.    Discharge Medication List as of 8/15/2017 11:35 AM          Final diagnoses:   Sprain and strain of temporomandibular joint   ITyrone, am serving as a trained medical scribe to document services personally performed by Ryan Munoz MD, based on the provider's statements to me.      Ryan PENA MD, was physically present and have reviewed and verified the accuracy of this note documented by Tyrone Draper.       8/15/2017   [facility], EMERGENCY DEPARTMENT     Carlo Munoz MD  08/15/17 1148

## 2017-08-15 NOTE — ED AVS SNAPSHOT
[facility], [place], Emergency Department    [address] [place] [id number]    Phone:  [phone]                                       Nhung Concepcion   MRN: [id number]    Department:  [facility], Emergency Department   Date of Visit:  8/15/2017           After Visit Summary Signature Page     I have received my discharge instructions, and my questions have been answered. I have discussed any challenges I see with this plan with the nurse or doctor.    ..........................................................................................................................................  Patient/Patient Representative Signature      ..........................................................................................................................................  Patient Representative Print Name and Relationship to Patient    ..................................................               ................................................  Date                                            Time    ..........................................................................................................................................  Reviewed by Signature/Title    ...................................................              ..............................................  Date                                                            Time

## 2017-08-15 NOTE — DISCHARGE INSTRUCTIONS
Please make an appointment to follow up with your dentist as soon as possible.     Ibuprofen 400 mg every 8 hours with food.    Soft food.    Return if worse pain or swelling.

## 2017-09-22 ENCOUNTER — OFFICE VISIT (OUTPATIENT)
Dept: PEDIATRICS | Facility: CLINIC | Age: 27
End: 2017-09-22
Payer: COMMERCIAL

## 2017-09-22 VITALS
WEIGHT: 217.7 LBS | HEIGHT: 65 IN | TEMPERATURE: 97.6 F | OXYGEN SATURATION: 98 % | DIASTOLIC BLOOD PRESSURE: 72 MMHG | SYSTOLIC BLOOD PRESSURE: 122 MMHG | HEART RATE: 116 BPM | BODY MASS INDEX: 36.27 KG/M2

## 2017-09-22 DIAGNOSIS — R07.0 THROAT PAIN: ICD-10-CM

## 2017-09-22 DIAGNOSIS — B34.9 VIRAL ILLNESS: Primary | ICD-10-CM

## 2017-09-22 LAB
BASOPHILS # BLD AUTO: 0 10E9/L (ref 0–0.2)
BASOPHILS NFR BLD AUTO: 0.1 %
DEPRECATED S PYO AG THROAT QL EIA: NORMAL
DIFFERENTIAL METHOD BLD: NORMAL
EOSINOPHIL # BLD AUTO: 0.1 10E9/L (ref 0–0.7)
EOSINOPHIL NFR BLD AUTO: 0.9 %
ERYTHROCYTE [DISTWIDTH] IN BLOOD BY AUTOMATED COUNT: 13.6 % (ref 10–15)
HCT VFR BLD AUTO: 39.6 % (ref 35–47)
HETEROPH AB SER QL: NEGATIVE
HGB BLD-MCNC: 13.1 G/DL (ref 11.7–15.7)
LYMPHOCYTES # BLD AUTO: 1.8 10E9/L (ref 0.8–5.3)
LYMPHOCYTES NFR BLD AUTO: 23.1 %
MCH RBC QN AUTO: 30.3 PG (ref 26.5–33)
MCHC RBC AUTO-ENTMCNC: 33.1 G/DL (ref 31.5–36.5)
MCV RBC AUTO: 92 FL (ref 78–100)
MONOCYTES # BLD AUTO: 0.4 10E9/L (ref 0–1.3)
MONOCYTES NFR BLD AUTO: 5.1 %
NEUTROPHILS # BLD AUTO: 5.4 10E9/L (ref 1.6–8.3)
NEUTROPHILS NFR BLD AUTO: 70.8 %
PLATELET # BLD AUTO: 292 10E9/L (ref 150–450)
RBC # BLD AUTO: 4.33 10E12/L (ref 3.8–5.2)
SPECIMEN SOURCE: NORMAL
WBC # BLD AUTO: 7.7 10E9/L (ref 4–11)

## 2017-09-22 PROCEDURE — 86308 HETEROPHILE ANTIBODY SCREEN: CPT | Performed by: INTERNAL MEDICINE

## 2017-09-22 PROCEDURE — 87880 STREP A ASSAY W/OPTIC: CPT | Performed by: INTERNAL MEDICINE

## 2017-09-22 PROCEDURE — 85025 COMPLETE CBC W/AUTO DIFF WBC: CPT | Performed by: INTERNAL MEDICINE

## 2017-09-22 PROCEDURE — 99213 OFFICE O/P EST LOW 20 MIN: CPT | Performed by: INTERNAL MEDICINE

## 2017-09-22 PROCEDURE — 80053 COMPREHEN METABOLIC PANEL: CPT | Performed by: INTERNAL MEDICINE

## 2017-09-22 PROCEDURE — 87081 CULTURE SCREEN ONLY: CPT | Performed by: INTERNAL MEDICINE

## 2017-09-22 PROCEDURE — 36415 COLL VENOUS BLD VENIPUNCTURE: CPT | Performed by: INTERNAL MEDICINE

## 2017-09-22 NOTE — PROGRESS NOTES
SUBJECTIVE:   Nhung Concepcion is a 27 year old female who presents to clinic today for the following health issues:      RESPIRATORY SYMPTOMS      Duration: 3 days    Description  sore throat, fever, headache, fatigue/malaise, hoarse voice, myalgias and nausea    Severity: moderate    Accompanying signs and symptoms: None    History (predisposing factors):  strep exposure    Precipitating or alleviating factors: None    Therapies tried and outcome:  rest and fluids        4 days of fever (Tm 100), body aches, headache, weakness.  Has missed work (as a ).     Mild sore throat this AM. No cough, no vomiting, diarrhea, or constipation.  Has felt some nausea, and stomach cramps.  Hard to eat.  No rashes.     Some sick contacts at work.     Problem list and histories reviewed & adjusted, as indicated.  Additional history: as documented    Patient Active Problem List   Diagnosis     Depression with anxiety     Gastroesophageal reflux disease without esophagitis     Suicide ideation     Past Surgical History:   Procedure Laterality Date     ORTHOPEDIC SURGERY      right arm surgery       Social History   Substance Use Topics     Smoking status: Current Every Day Smoker     Types: Cigarettes     Smokeless tobacco: Never Used     Alcohol use Yes      Comment: 2 times a week     Family History   Problem Relation Age of Onset     Breast Cancer Maternal Grandmother          Current Outpatient Prescriptions   Medication Sig Dispense Refill     vortioxetine (TRINTELLIX) 10 MG tablet Take 10 mg by mouth daily       diphenhydrAMINE (BENADRYL) 25 MG capsule Take 1 capsule (25 mg) by mouth 2 times daily as needed for other (for anxiety or insomnia.) 60 capsule 0     omeprazole (PRILOSEC) 40 MG capsule Take 1 capsule (40 mg) by mouth daily Take 30-60 minutes before a meal. 90 capsule 3     nicotine polacrilex (CVS NICOTINE POLACRILEX) 2 MG gum Place 1 each (2 mg) inside cheek as needed for smoking cessation (Patient not  "taking: Reported on 9/22/2017) 60 tablet 3     nicotine (NICODERM CQ) 21 MG/24HR 24 hr patch Place 1 patch onto the skin every 24 hours (Patient not taking: Reported on 9/22/2017) 30 patch 3     nicotine (NICODERM CQ) 14 MG/24HR patch 2h hr Place 1 patch onto the skin every 24 hours (Patient not taking: Reported on 9/22/2017) 30 patch 1     FLUoxetine (PROZAC) 20 MG capsule Take 1 capsule (20 mg) by mouth daily (Patient not taking: Reported on 9/22/2017) 90 capsule 1     Allergies   Allergen Reactions     Wellbutrin [Bupropion]      History of seizure activity while treated with Wellbutrin     Sulfa Drugs Rash     BP Readings from Last 3 Encounters:   09/22/17 122/72   08/15/17 (!) 132/92   07/16/17 131/65    Wt Readings from Last 3 Encounters:   09/22/17 217 lb 11.2 oz (98.7 kg)   08/15/17 220 lb (99.8 kg)   07/16/17 215 lb (97.5 kg)                  Labs reviewed in EPIC        Reviewed and updated as needed this visit by clinical staffTobacco  Allergies  Meds  Med Hx  Surg Hx  Fam Hx  Soc Hx      Reviewed and updated as needed this visit by Provider         ROS:  C: NEGATIVE for fever, chills, change in weight  E/M: NEGATIVE for ear, mouth and throat problems  R: NEGATIVE for significant cough or SOB  CV: NEGATIVE for chest pain, palpitations or peripheral edema    OBJECTIVE:                                                    /72 (BP Location: Right arm, Patient Position: Chair, Cuff Size: Adult Large)  Pulse 116  Temp 97.6  F (36.4  C) (Tympanic)  Ht 5' 5\" (1.651 m)  Wt 217 lb 11.2 oz (98.7 kg)  SpO2 98%  BMI 36.23 kg/m2  Body mass index is 36.23 kg/(m^2).   GENERAL: healthy, alert, well nourished, well hydrated, no distress  HENT: ear canals- normal; TMs- normal; Nose- normal; Mouth- no ulcers, no lesions  NECK: no tenderness, no adenopathy, no asymmetry, no masses, no stiffness; thyroid- normal to palpation  RESP: lungs clear to auscultation - no rales, no rhonchi, no wheezes  CV: regular " "rates and rhythm, normal S1 S2, no S3 or S4 and no murmur, no click or rub -  ABDOMEN: soft, no tenderness, no  hepatosplenomegaly, no masses, normal bowel sounds    Diagnostic test results:  Diagnostic Test Results:  none        ASSESSMENT/PLAN:                                                    1. Acute pharyngitis  Will obtain mono test at patient request.   - Strep, Rapid Screen    2. Viral illness  Given her tchycardia, and the fact that patient is \"curious\" (works in lab) she would like blood tests today to evaluate possible severity of her dehydration.   Check complete blood count for possible anemia (for her fatigue) although this seems less likely.   - CBC with platelets and differential  - Comprehensive metabolic panel  - Mononucleosis screen      See Patient Instructions    Clark Nicole MD  [facility]    "

## 2017-09-22 NOTE — PATIENT INSTRUCTIONS
Push small, frequent fluids.    May take tylenol and ibuprofen for achiness.    Clark Nicole MD  Internal Medicine and Pediatrics

## 2017-09-22 NOTE — NURSING NOTE
"No chief complaint on file.      Initial /72 (BP Location: Right arm, Patient Position: Chair, Cuff Size: Adult Large)  Pulse 116  Temp 97.6  F (36.4  C) (Tympanic)  Ht 5' 5\" (1.651 m)  Wt 217 lb 11.2 oz (98.7 kg)  SpO2 98%  BMI 36.23 kg/m2 Estimated body mass index is 36.23 kg/(m^2) as calculated from the following:    Height as of this encounter: 5' 5\" (1.651 m).    Weight as of this encounter: 217 lb 11.2 oz (98.7 kg).  Medication Reconciliation: complete   Yecenia Rodriguez LPN      "

## 2017-09-22 NOTE — MR AVS SNAPSHOT
"              After Visit Summary   9/22/2017    Nhung Cnocepcion    MRN: [id number]           Patient Information     Date Of Birth          1990        Visit Information        Provider Department      9/22/2017 11:00 AM Clark Nicole MD [facility]        Today's Diagnoses     Acute pharyngitis    -  1      Care Instructions    Push small, frequent fluids.    May take tylenol and ibuprofen for achiness.    Clark Nicole MD  Internal Medicine and Pediatrics             Follow-ups after your visit        Who to contact     If you have questions or need follow up information about today's clinic visit or your schedule please contact [facility] directly at [phone].  Normal or non-critical lab and imaging results will be communicated to you by MyChart, letter or phone within 4 business days after the clinic has received the results. If you do not hear from us within 7 days, please contact the clinic through [employer] or phone. If you have a critical or abnormal lab result, we will notify you by phone as soon as possible.  Submit refill requests through [employer] or call your pharmacy and they will forward the refill request to us. Please allow 3 business days for your refill to be completed.          Additional Information About Your Visit        MyChart Information     [employer] gives you secure access to your electronic health record. If you see a primary care provider, you can also send messages to your care team and make appointments. If you have questions, please call your primary care clinic.  If you do not have a primary care provider, please call [phone] and they will assist you.        Care EveryWhere ID     This is your Care EveryWhere ID. This could be used by other organizations to access your [place] medical records  [id number]        Your Vitals Were     Pulse Temperature Height Pulse Oximetry BMI (Body Mass Index)       116 97.6  F (36.4  C) (Tympanic) 5' 5\" (1.651 m) 98% " 36.23 kg/m2        Blood Pressure from Last 3 Encounters:   09/22/17 122/72   08/15/17 (!) 132/92   07/16/17 131/65    Weight from Last 3 Encounters:   09/22/17 217 lb 11.2 oz (98.7 kg)   08/15/17 220 lb (99.8 kg)   07/16/17 215 lb (97.5 kg)              We Performed the Following     Strep, Rapid Screen        Primary Care Provider Office Phone # Fax #    Kimberly YEN Garcia [facility] [phone] [address] [place] [address]        Equal Access to Services     [facility]: Hadii aad ku hadasho Soomaali, waaxda luqadaha, qaybta kaalmada adeegyada, steven denny . So [facility] [phone].    ATENCIÓN: Si habla español, tiene a arboleda disposición servicios gratuitos de asistencia lingüística. Llame al [phone].    We comply with applicable federal civil rights laws and [place] laws. We do not discriminate on the basis of race, color, national origin, age, disability sex, sexual orientation or gender identity.            Thank you!     Thank you for choosing [facility]  [facility] your care. Our goal is always to provide you with excellent care. Hearing back from our patients is one way we can continue to improve our services. Please take a few minutes to complete the written survey that you may receive in the mail after your visit with us. Thank you!             Your Updated Medication List - Protect others around you: Learn how to safely use, store and throw away your medicines at www.disposemymeds.org.          This list is accurate as of: 9/22/17 11:18 AM.  Always use your most recent med list.                   Brand Name Dispense Instructions for use Diagnosis    diphenhydrAMINE 25 MG capsule    BENADRYL    60 capsule    Take 1 capsule (25 mg) by mouth 2 times daily as needed for other (for anxiety or insomnia.)    Depression with anxiety       FLUoxetine 20 MG capsule    PROzac    90 capsule    Take 1 capsule (20 mg) by mouth daily     Depression with anxiety       * nicotine 14 MG/24HR 24 hr patch    NICODERM CQ    30 patch    Place 1 patch onto the skin every 24 hours    Encounter for smoking cessation counseling, Tobacco use disorder       * nicotine 21 MG/24HR 24 hr patch    NICODERM CQ    30 patch    Place 1 patch onto the skin every 24 hours    Tobacco abuse       nicotine polacrilex 2 MG gum    CVS NICOTINE POLACRILEX    60 tablet    Place 1 each (2 mg) inside cheek as needed for smoking cessation    Tobacco abuse       omeprazole 40 MG capsule    priLOSEC    90 capsule    Take 1 capsule (40 mg) by mouth daily Take 30-60 minutes before a meal.    Gastroesophageal reflux disease without esophagitis       TRINTELLIX 10 MG tablet   Generic drug:  vortioxetine      Take 10 mg by mouth daily        * Notice:  This list has 2 medication(s) that are the same as other medications prescribed for you. Read the directions carefully, and ask your doctor or other care provider to review them with you.

## 2017-09-23 LAB
ALBUMIN SERPL-MCNC: 4 G/DL (ref 3.4–5)
ALP SERPL-CCNC: 88 U/L (ref 40–150)
ALT SERPL W P-5'-P-CCNC: 27 U/L (ref 0–50)
ANION GAP SERPL CALCULATED.3IONS-SCNC: 5 MMOL/L (ref 3–14)
AST SERPL W P-5'-P-CCNC: 21 U/L (ref 0–45)
BACTERIA SPEC CULT: NORMAL
BILIRUB SERPL-MCNC: 0.3 MG/DL (ref 0.2–1.3)
BUN SERPL-MCNC: 10 MG/DL (ref 7–30)
CALCIUM SERPL-MCNC: 9.3 MG/DL (ref 8.5–10.1)
CHLORIDE SERPL-SCNC: 104 MMOL/L (ref 94–109)
CO2 SERPL-SCNC: 28 MMOL/L (ref 20–32)
CREAT SERPL-MCNC: 0.89 MG/DL (ref 0.52–1.04)
GFR SERPL CREATININE-BSD FRML MDRD: 75 ML/MIN/1.7M2
GLUCOSE SERPL-MCNC: 133 MG/DL (ref 70–99)
POTASSIUM SERPL-SCNC: 4.1 MMOL/L (ref 3.4–5.3)
PROT SERPL-MCNC: 7.6 G/DL (ref 6.8–8.8)
SODIUM SERPL-SCNC: 137 MMOL/L (ref 133–144)
SPECIMEN SOURCE: NORMAL

## 2018-04-18 ENCOUNTER — OFFICE VISIT (OUTPATIENT)
Dept: URGENT CARE | Facility: URGENT CARE | Age: 28
End: 2018-04-18
Payer: COMMERCIAL

## 2018-04-18 VITALS
DIASTOLIC BLOOD PRESSURE: 84 MMHG | HEART RATE: 95 BPM | TEMPERATURE: 97.5 F | BODY MASS INDEX: 36.11 KG/M2 | OXYGEN SATURATION: 98 % | SYSTOLIC BLOOD PRESSURE: 129 MMHG | WEIGHT: 217 LBS

## 2018-04-18 DIAGNOSIS — M94.0 COSTOCHONDRITIS: ICD-10-CM

## 2018-04-18 DIAGNOSIS — R05.9 COUGH: Primary | ICD-10-CM

## 2018-04-18 PROCEDURE — 99213 OFFICE O/P EST LOW 20 MIN: CPT | Performed by: FAMILY MEDICINE

## 2018-04-18 RX ORDER — AZITHROMYCIN 250 MG/1
TABLET, FILM COATED ORAL
Qty: 6 TABLET | Refills: 0 | Status: SHIPPED | OUTPATIENT
Start: 2018-04-18 | End: 2018-05-29

## 2018-04-18 RX ORDER — BENZONATATE 100 MG/1
200 CAPSULE ORAL 3 TIMES DAILY PRN
Qty: 42 CAPSULE | Refills: 3 | Status: SHIPPED | OUTPATIENT
Start: 2018-04-18 | End: 2018-05-29

## 2018-04-18 RX ORDER — CODEINE PHOSPHATE AND GUAIFENESIN 10; 100 MG/5ML; MG/5ML
1-2 SOLUTION ORAL EVERY 6 HOURS PRN
Qty: 120 ML | Refills: 0 | Status: SHIPPED | OUTPATIENT
Start: 2018-04-18 | End: 2018-05-29

## 2018-04-18 NOTE — MR AVS SNAPSHOT
After Visit Summary   4/18/2018    Nhung Concepcion    MRN: [id number]           Patient Information     Date Of Birth          1990        Visit Information        Provider Department      4/18/2018 3:55 PM Luke Salomon MD [facility]        Today's Diagnoses     Cough    -  1    Costochondritis          Care Instructions    Place ice onto the painful areas of the chest for 10 minutes at a time, every 2-3 hours while awake.     Ibuprofen for the chest pain.      follow up with your primary care provider if not better in 7-10 days.               Follow-ups after your visit        Who to contact     If you have questions or need follow up information about today's clinic visit or your schedule please contact [facility] directly at [phone].  Normal or non-critical lab and imaging results will be communicated to you by [employer], letter or phone within 4 business days after the clinic has received the results. If you do not hear from us within 7 days, please contact the clinic through [employer] or phone. If you have a critical or abnormal lab result, we will notify you by phone as soon as possible.  Submit refill requests through [employer] or call your pharmacy and they will forward the refill request to us. Please allow 3 business days for your refill to be completed.          Additional Information About Your Visit        MyChart Information     [employer] gives you secure access to your electronic health record. If you see a primary care provider, you can also send messages to your care team and make appointments. If you have questions, please call your primary care clinic.  If you do not have a primary care provider, please call [phone] and they will assist you.        Care EveryWhere ID     This is your Care EveryWhere ID. This could be used by other organizations to access your [place] medical records  [id number]        Your Vitals Were     Pulse Temperature  Pulse Oximetry BMI (Body Mass Index)          95 97.5  F (36.4  C) (Tympanic) 98% 36.11 kg/m2         Blood Pressure from Last 3 Encounters:   04/18/18 129/84   09/22/17 122/72   08/15/17 (!) 132/92    Weight from Last 3 Encounters:   04/18/18 217 lb (98.4 kg)   09/22/17 217 lb 11.2 oz (98.7 kg)   08/15/17 220 lb (99.8 kg)              Today, you had the following     No orders found for display         Today's Medication Changes          These changes are accurate as of 4/18/18  4:18 PM.  If you have any questions, ask your nurse or doctor.               Start taking these medicines.        Dose/Directions    azithromycin 250 MG tablet   Commonly known as:  ZITHROMAX   Used for:  Cough   Started by:  Luke Salomon MD        Two tablets first day, then one tablet daily for four days.   Quantity:  6 tablet   Refills:  0       benzonatate 100 MG capsule   Commonly known as:  TESSALON   Used for:  Cough   Started by:  Luke Salomon MD        Dose:  200 mg   Take 2 capsules (200 mg) by mouth 3 times daily as needed   Quantity:  42 capsule   Refills:  3       guaiFENesin-codeine 100-10 MG/5ML Soln solution   Commonly known as:  ROBITUSSIN AC   Used for:  Cough   Started by:  Luke Salomon MD        Dose:  1-2 tsp.   Take 5-10 mLs by mouth every 6 hours as needed   Quantity:  120 mL   Refills:  0            Where to get your medicines      These medications were sent to [facility] - [address]   [address], [place] [address]     Phone:  [phone]     azithromycin 250 MG tablet    benzonatate 100 MG capsule         Some of these will need a paper prescription and others can be bought over the counter.  Ask your nurse if you have questions.     Bring a paper prescription for each of these medications     guaiFENesin-codeine 100-10 MG/5ML Soln solution                Primary Care Provider Office Phone # Fax #    YEN Guo [phone]  [phone]       [address] [place] [address]        Equal Access to Services     [facility]: Hadii masood blake hadtedjaki Sokerri, waaxda luqadaha, qaybta kaalmada boraangelicamorales, steven moore christopherashlee xienicola abelardonavdeepmeghna miller. So [facility] [phone].    ATENCIÓN: Si habla español, tiene a arboleda disposición servicios gratuitos de asistencia lingüística. Llame al [phone].    We comply with applicable federal civil rights laws and [place] laws. We do not discriminate on the basis of race, color, national origin, age, disability, sex, sexual orientation, or gender identity.            Thank you!     Thank you for choosing [facility] [place] [facility]  for your care. Our goal is always to provide you with excellent care. Hearing back from our patients is one way we can continue to improve our services. Please take a few minutes to complete the written survey that you may receive in the mail after your visit with us. Thank you!             Your Updated Medication List - Protect others around you: Learn how to safely use, store and throw away your medicines at www.disposemymeds.org.          This list is accurate as of 4/18/18  4:18 PM.  Always use your most recent med list.                   Brand Name Dispense Instructions for use Diagnosis    azithromycin 250 MG tablet    ZITHROMAX    6 tablet    Two tablets first day, then one tablet daily for four days.    Cough       benzonatate 100 MG capsule    TESSALON    42 capsule    Take 2 capsules (200 mg) by mouth 3 times daily as needed    Cough       diphenhydrAMINE 25 MG capsule    BENADRYL    60 capsule    Take 1 capsule (25 mg) by mouth 2 times daily as needed for other (for anxiety or insomnia.)    Depression with anxiety       FLUoxetine 20 MG capsule    PROzac    90 capsule    Take 1 capsule (20 mg) by mouth daily    Depression with anxiety       guaiFENesin-codeine 100-10 MG/5ML Soln solution    ROBITUSSIN AC    120 mL    Take 5-10 mLs by mouth every 6 hours as  needed    Cough       * nicotine 14 MG/24HR 24 hr patch    NICODERM CQ    30 patch    Place 1 patch onto the skin every 24 hours    Encounter for smoking cessation counseling, Tobacco use disorder       * nicotine 21 MG/24HR 24 hr patch    NICODERM CQ    30 patch    Place 1 patch onto the skin every 24 hours    Tobacco abuse       nicotine polacrilex 2 MG gum    CVS NICOTINE POLACRILEX    60 tablet    Place 1 each (2 mg) inside cheek as needed for smoking cessation    Tobacco abuse       omeprazole 40 MG capsule    priLOSEC    90 capsule    Take 1 capsule (40 mg) by mouth daily Take 30-60 minutes before a meal.    Gastroesophageal reflux disease without esophagitis       TRINTELLIX 10 MG tablet   Generic drug:  vortioxetine      Take 10 mg by mouth daily        * Notice:  This list has 2 medication(s) that are the same as other medications prescribed for you. Read the directions carefully, and ask your doctor or other care provider to review them with you.

## 2018-04-18 NOTE — PATIENT INSTRUCTIONS
Place ice onto the painful areas of the chest for 10 minutes at a time, every 2-3 hours while awake.     Ibuprofen for the chest pain.      follow up with your primary care provider if not better in 7-10 days.

## 2018-04-18 NOTE — PROGRESS NOTES
SUBJECTIVE:   Nhung Concepcion is a 28 year old female presenting with a chief complaint of cough (nonproductive, but worse at night and interfering with sleep),. No fevers.  Sometimes it feels as if there is someone standing on the chest (this sensation improves as the day progresses).  Her son was diagnosed with pneumonia four days ago.    Onset of symptoms was two weeks ago.  Course of illness is still present. .      Current and Associated symptoms: cold symptoms and runny nose since last week (improving)  Treatment measures tried include Nyquil (last week).  .  Predisposing factors include Patient smokes and her son was diagnosed with pneumonia four days ago. .    Past Medical History:   Diagnosis Date     Anxiety      Depressive disorder      Current Outpatient Prescriptions   Medication Sig Dispense Refill     diphenhydrAMINE (BENADRYL) 25 MG capsule Take 1 capsule (25 mg) by mouth 2 times daily as needed for other (for anxiety or insomnia.) 60 capsule 0     FLUoxetine (PROZAC) 20 MG capsule Take 1 capsule (20 mg) by mouth daily (Patient not taking: Reported on 9/22/2017) 90 capsule 1     nicotine (NICODERM CQ) 14 MG/24HR patch 2h hr Place 1 patch onto the skin every 24 hours (Patient not taking: Reported on 9/22/2017) 30 patch 1     nicotine (NICODERM CQ) 21 MG/24HR 24 hr patch Place 1 patch onto the skin every 24 hours (Patient not taking: Reported on 9/22/2017) 30 patch 3     nicotine polacrilex (CVS NICOTINE POLACRILEX) 2 MG gum Place 1 each (2 mg) inside cheek as needed for smoking cessation (Patient not taking: Reported on 9/22/2017) 60 tablet 3     omeprazole (PRILOSEC) 40 MG capsule Take 1 capsule (40 mg) by mouth daily Take 30-60 minutes before a meal. 90 capsule 3     vortioxetine (TRINTELLIX) 10 MG tablet Take 10 mg by mouth daily       Social History   Substance Use Topics     Smoking status: Current Every Day Smoker     Types: Cigarettes     Smokeless tobacco: Never Used     Alcohol use Yes       Comment: 2 times a week       ROS:  Review of systems negative except as stated above.    OBJECTIVE:  /84 (BP Location: Right arm, Patient Position: Chair, Cuff Size: Adult Large)  Pulse 95  Temp 97.5  F (36.4  C) (Tympanic)  Wt 217 lb (98.4 kg)  SpO2 98%  BMI 36.11 kg/m2  GENERAL APPEARANCE: healthy, alert and no distress.  Occasional raspy cough.  No acute respiratory distress.    HENT: Mouth:  Oropharynx within normal limits   NECK: supple, nontender, no lymphadenopathy  RESP: lungs clear to auscultation - no rales, rhonchi or wheezes  CV: regular rates and rhythm, normal S1 S2, no murmur noted  SKIN: no suspicious lesions or rashes.  Skin is pink and warm.     ASSESSMENT:  Cough  Costochondritis    PLAN:  For the cough:  Rx:  Tessalon Perles, Cheratussin AC, Azithromycin  follow up with the primary care provider if not better in 7-10 days.     For the Costochondritis:  Ibuprofen  Ice onto the painful areas of the chest      Luke Salomon MD

## 2018-04-20 ENCOUNTER — TELEPHONE (OUTPATIENT)
Dept: PEDIATRICS | Facility: CLINIC | Age: 28
End: 2018-04-20

## 2018-04-20 DIAGNOSIS — R05.9 COUGH: Primary | ICD-10-CM

## 2018-04-20 RX ORDER — DOXYCYCLINE 100 MG/1
100 CAPSULE ORAL 2 TIMES DAILY
Qty: 14 CAPSULE | Refills: 0 | Status: SHIPPED | OUTPATIENT
Start: 2018-04-20 | End: 2018-05-29

## 2018-04-20 NOTE — TELEPHONE ENCOUNTER
I sent a new Rx for doxycycline to the [facility] in [place]. Discontinue azithromycin.    Rosalinda Urena PA-C

## 2018-04-20 NOTE — TELEPHONE ENCOUNTER
Patient seen in  and diagnosed with bronchitis. Was give a Z-pack. Has been vomiting ever since. Is requesting a different medication be sent to the pharmacy. Still has the cough.

## 2018-05-29 ENCOUNTER — OFFICE VISIT (OUTPATIENT)
Dept: PEDIATRICS | Facility: CLINIC | Age: 28
End: 2018-05-29
Payer: COMMERCIAL

## 2018-05-29 VITALS
TEMPERATURE: 98.7 F | HEART RATE: 94 BPM | BODY MASS INDEX: 35.87 KG/M2 | HEIGHT: 65 IN | OXYGEN SATURATION: 98 % | WEIGHT: 215.3 LBS | SYSTOLIC BLOOD PRESSURE: 122 MMHG | DIASTOLIC BLOOD PRESSURE: 76 MMHG

## 2018-05-29 DIAGNOSIS — Z71.6 ENCOUNTER FOR SMOKING CESSATION COUNSELING: ICD-10-CM

## 2018-05-29 DIAGNOSIS — K21.9 GASTROESOPHAGEAL REFLUX DISEASE WITHOUT ESOPHAGITIS: ICD-10-CM

## 2018-05-29 DIAGNOSIS — Z31.69 ENCOUNTER FOR PRECONCEPTION CONSULTATION: ICD-10-CM

## 2018-05-29 DIAGNOSIS — F41.8 DEPRESSION WITH ANXIETY: Primary | ICD-10-CM

## 2018-05-29 DIAGNOSIS — F10.20 ALCOHOLISM (H): ICD-10-CM

## 2018-05-29 PROBLEM — E66.01 MORBID OBESITY (H): Status: ACTIVE | Noted: 2018-05-29

## 2018-05-29 PROCEDURE — 99214 OFFICE O/P EST MOD 30 MIN: CPT | Performed by: NURSE PRACTITIONER

## 2018-05-29 RX ORDER — NICOTINE 21 MG/24HR
1 PATCH, TRANSDERMAL 24 HOURS TRANSDERMAL EVERY 24 HOURS
Qty: 30 PATCH | Refills: 1 | Status: SHIPPED | OUTPATIENT
Start: 2018-05-29 | End: 2018-10-22 | Stop reason: DRUGHIGH

## 2018-05-29 RX ORDER — NICOTINE 21 MG/24HR
1 PATCH, TRANSDERMAL 24 HOURS TRANSDERMAL EVERY 24 HOURS
Qty: 14 PATCH | Refills: 1 | Status: SHIPPED | OUTPATIENT
Start: 2018-05-29 | End: 2018-09-10

## 2018-05-29 NOTE — PROGRESS NOTES
"  SUBJECTIVE:   Nhung Concepcion is a 28 year old female who presents to clinic today for the following health issues:    Patient wants to discuss medication.  Wants to discuss smoking cessation.    Also wants to discuss possible medication changes as she is thinking of becoming pregnant. Needs medication review.    ROS: const/psych/gi/gyn otherwise negative     OBJECTIVE:  /76 (BP Location: Right arm)  Pulse 94  Temp 98.7  F (37.1  C) (Tympanic)  Ht 5' 5\" (1.651 m)  Wt 215 lb 4.8 oz (97.7 kg)  SpO2 98%  BMI 35.83 kg/m2  CONSTITUTIONAL: Alert, well-nourished, well-groomed, NAD  RESP: Lungs CTA. No wheeze, rhonchi, rales.  CV: HRRR S1 S2 No MRG. No peripheral edema  PSYCH: Bright affect. Appropriate mentation and speech.       ASSESSMENT/PLAN:  (F41.8) Depression with anxiety  (primary encounter diagnosis)  Comment: Patient with a history of severe depression, anxiety, and alcoholism, which acutely worsened last summer with the birth of her first child (her wife was the carrier). She has been in DBT, which is helping and has maintained sobriety. She still has intermittent fleeting passive suicidal thinking, but she feels her depression is under control. She is thinking about carrying a pregnancy, and states both her wife and therapist think she would be ok. I do have some concerns given that her sobriety is so new, the fact that she is still having suicidal thinking, and that having a new child (although not biologically) is what triggered her sx last time.  Plan:   -Discussed my concerns with her today, but told her I support whatever decision she chooses  -Recommended continuing SSRI and therapy during pregnancy  -Contracted for safety. She is confident she is not currently at risk for suicide and has many crisis resources through DBT  -She will continue to f/u with her therapist twice weekly and her psychiatrist as scheduled.     (F10.20) Alcoholism (H)  Comment: As above. Currently sober.    (Z31.69) " Encounter for preconception consultation  Comment: See above.   Plan:   -Recommended starting prenatal.    (Z71.6,  Z72.0) Encounter for smoking cessation counseling  Comment: Did not tolerate Chantix or Wellbutrin. Has had success with the patch in the past.  Plan: nicotine (NICODERM CQ) 14 MG/24HR 24 hr patch,         nicotine (NICODERM CQ) 21 MG/24HR 24 hr patch,         nicotine polacrilex (CVS NICOTINE POLACRILEX) 2        MG gum         -Recommended also trying acupuncture.     (K21.9) Gastroesophageal reflux disease without esophagitis  Comment: Patient with a history of GERD. She was also previously treated for H pylori. At the time, states she had an EGD that showed no ulcers but some irritation in the stomach. Given that she is asymptomatic and planning to start trying to get pregnant starting in about 3 months, will try to transition her from Omeprazole to ranitidine, which is recommended in pregnancy.   Plan: omeprazole (PRILOSEC) 20 MG CR capsule,         ranitidine (ZANTAC) 150 MG tablet    -Go down to 20mg on the Omeprazole and start ranitidine (Zantac) twice a day for 2 weeks  -Then start taking Omeprazole every other day and continue Zantac twice a day for 2 weeks  -Then start taking Omeprazole twice a week and continue Zantac for 2 weeks  -Then stop Omeprazole and continue Zantac  -Discussed avoiding triggering foods and contribution of weight gain to GERD.   -F/U if symptoms worsen with this transition        CASTILLO Dubois-DNP.

## 2018-05-29 NOTE — PATIENT INSTRUCTIONS
-[facility]: Check out acupuncture  -Start patches and gum  -Start prenatal now to reduce risk of nausea during pregnancy.   -Ok to continue depression meds at same dose during pregnancy      -Go down to 20mg on the Omeprazole and start ranitidine (Zantac) twice a day for 2 weeks  -Then start taking Omeprazole every other day and continue Zantac twice a day for 2 weeks  -Then start taking Omeprazole twice a week and continue Zantac for 2 weeks  -Then stop Omeprazole and continue Zantac

## 2018-05-29 NOTE — MR AVS SNAPSHOT
After Visit Summary   5/29/2018    Nhung Concepcion    MRN: [id number]           Patient Information     Date Of Birth          1990        Visit Information        Provider Department      5/29/2018 1:20 PM Kimberly Navarro APRN CNP [facility]        Today's Diagnoses     Gastroesophageal reflux disease without esophagitis    -  1    Encounter for smoking cessation counseling        Tobacco use disorder        Tobacco abuse          Care Instructions    -[facility]: Check out acupuncture  -Start patches and gum  -Start prenatal now to reduce risk of nausea during pregnancy.   -Ok to continue depression meds at same dose during pregnancy      -Go down to 20mg on the Omeprazole and start ranitidine (Zantac) twice a day for 2 weeks  -Then start taking Omeprazole every other day and continue Zantac twice a day for 2 weeks  -Then start taking Omeprazole twice a week and continue Zantac for 2 weeks  -Then stop Omeprazole and continue Zantac          Follow-ups after your visit        Who to contact     If you have questions or need follow up information about today's clinic visit or your schedule please contact [facility] directly at [phone].  Normal or non-critical lab and imaging results will be communicated to you by YaSabehart, letter or phone within 4 business days after the clinic has received the results. If you do not hear from us within 7 days, please contact the clinic through [email or web address] or phone. If you have a critical or abnormal lab result, we will notify you by phone as soon as possible.  Submit refill requests through [employer] or call your pharmacy and they will forward the refill request to us. Please allow 3 business days for your refill to be completed.          Additional Information About Your Visit        [employer] Information     [employer] gives you secure access to your electronic health record. If you see a primary care  "provider, you can also send messages to your care team and make appointments. If you have questions, please call your primary care clinic.  If you do not have a primary care provider, please call [phone] and they will assist you.        Care EveryWhere ID     This is your Care EveryWhere ID. This could be used by other organizations to access your [place] medical records  [id number]        Your Vitals Were     Pulse Temperature Height Pulse Oximetry BMI (Body Mass Index)       94 98.7  F (37.1  C) (Tympanic) 5' 5\" (1.651 m) 98% 35.83 kg/m2        Blood Pressure from Last 3 Encounters:   05/29/18 122/76   04/18/18 129/84   09/22/17 122/72    Weight from Last 3 Encounters:   05/29/18 215 lb 4.8 oz (97.7 kg)   04/18/18 217 lb (98.4 kg)   09/22/17 217 lb 11.2 oz (98.7 kg)              Today, you had the following     No orders found for display         Today's Medication Changes          These changes are accurate as of 5/29/18  1:55 PM.  If you have any questions, ask your nurse or doctor.               Start taking these medicines.        Dose/Directions    ranitidine 150 MG tablet   Commonly known as:  ZANTAC   Used for:  Gastroesophageal reflux disease without esophagitis   Started by:  Kimberly Navarro APRN CNP        Dose:  150 mg   Take 1 tablet (150 mg) by mouth 2 times daily   Quantity:  180 tablet   Refills:  1         These medicines have changed or have updated prescriptions.        Dose/Directions    * nicotine 14 MG/24HR 24 hr patch   Commonly known as:  NICODERM CQ   This may have changed:  Another medication with the same name was changed. Make sure you understand how and when to take each.   Used for:  Encounter for smoking cessation counseling, Tobacco use disorder   Changed by:  Kimberly Navarro APRN CNP        Dose:  1 patch   Place 1 patch onto the skin every 24 hours   Quantity:  14 patch   Refills:  1       * nicotine 21 MG/24HR 24 hr patch   Commonly known as:  NICODERM CQ "   This may have changed:  additional instructions   Used for:  Tobacco abuse   Changed by:  Kimberly Navarro APRN CNP        Dose:  1 patch   Place 1 patch onto the skin every 24 hours Take for 6 weeks then decrease to 14mg dose   Quantity:  30 patch   Refills:  1       * omeprazole 40 MG capsule   Commonly known as:  priLOSEC   This may have changed:  Another medication with the same name was added. Make sure you understand how and when to take each.   Used for:  Gastroesophageal reflux disease without esophagitis   Changed by:  Kimberly Navarro APRN CNP        Dose:  40 mg   Take 1 capsule (40 mg) by mouth daily Take 30-60 minutes before a meal.   Quantity:  90 capsule   Refills:  3       * omeprazole 20 MG CR capsule   Commonly known as:  priLOSEC   This may have changed:  You were already taking a medication with the same name, and this prescription was added. Make sure you understand how and when to take each.   Used for:  Gastroesophageal reflux disease without esophagitis   Changed by:  Kimberly Navarro APRN CNP        Dose:  20 mg   Take 1 capsule (20 mg) by mouth daily   Quantity:  90 capsule   Refills:  1       * Notice:  This list has 4 medication(s) that are the same as other medications prescribed for you. Read the directions carefully, and ask your doctor or other care provider to review them with you.      Stop taking these medicines if you haven't already. Please contact your care team if you have questions.     doxycycline 100 MG capsule   Commonly known as:  VIBRAMYCIN   Stopped by:  Kimberly Navarro APRN CNP           FLUoxetine 20 MG capsule   Commonly known as:  PROzac   Stopped by:  Kimberly Navarro APRN CNP                Where to get your medicines      These medications were sent to [email or web address] [address] [facility] [address] AT [address]  [address] [facility] [facility] [place] [place] [address]     Phone:  [phone]      nicotine 14 MG/24HR 24 hr patch    nicotine 21 MG/24HR 24 hr patch    nicotine polacrilex 2 MG gum    omeprazole 20 MG CR capsule    ranitidine 150 MG tablet                Primary Care Provider Office Phone # Fax #    YEN Guo [phone] [address] [place] [address]        Equal Access to Services     [facility]: Hadii aad ku hadasho Soomaali, waaxda luqadaha, qaybta kaalmada adeegyada, waxay idiin hayaan adeeg kharash la'aan . So [facility] [phone].    ATENCIÓN: Si habla español, tiene a arboleda disposición servicios gratuitos de asistencia lingüística. Llame al [phone].    We comply with applicable federal civil rights laws and [place] laws. We do not discriminate on the basis of race, color, national origin, age, disability, sex, sexual orientation, or gender identity.            Thank you!     Thank you for choosing [facility] [place]  for your care. Our goal is always to provide you with excellent care. Hearing back from our patients is one way we can continue to improve our services. Please take a few minutes to complete the written survey that you may receive in the mail after your visit with us. Thank you!             Your Updated Medication List - Protect others around you: Learn how to safely use, store and throw away your medicines at www.disposemymeds.org.          This list is accurate as of 5/29/18  1:55 PM.  Always use your most recent med list.                   Brand Name Dispense Instructions for use Diagnosis    * nicotine 14 MG/24HR 24 hr patch    NICODERM CQ    14 patch    Place 1 patch onto the skin every 24 hours    Encounter for smoking cessation counseling, Tobacco use disorder       * nicotine 21 MG/24HR 24 hr patch    NICODERM CQ    30 patch    Place 1 patch onto the skin every 24 hours Take for 6 weeks then decrease to 14mg dose    Tobacco abuse       nicotine polacrilex 2 MG gum    CVS NICOTINE POLACRILEX    60  tablet    Place 1 each (2 mg) inside cheek as needed for smoking cessation    Tobacco abuse       * omeprazole 40 MG capsule    priLOSEC    90 capsule    Take 1 capsule (40 mg) by mouth daily Take 30-60 minutes before a meal.    Gastroesophageal reflux disease without esophagitis       * omeprazole 20 MG CR capsule    priLOSEC    90 capsule    Take 1 capsule (20 mg) by mouth daily    Gastroesophageal reflux disease without esophagitis       ranitidine 150 MG tablet    ZANTAC    180 tablet    Take 1 tablet (150 mg) by mouth 2 times daily    Gastroesophageal reflux disease without esophagitis       TRINTELLIX 10 MG tablet   Generic drug:  vortioxetine      Take 10 mg by mouth daily        * Notice:  This list has 4 medication(s) that are the same as other medications prescribed for you. Read the directions carefully, and ask your doctor or other care provider to review them with you.

## 2018-06-13 ENCOUNTER — MYC MEDICAL ADVICE (OUTPATIENT)
Dept: PEDIATRICS | Facility: CLINIC | Age: 28
End: 2018-06-13

## 2018-06-13 DIAGNOSIS — F41.8 DEPRESSION WITH ANXIETY: Primary | ICD-10-CM

## 2018-06-13 ASSESSMENT — PATIENT HEALTH QUESTIONNAIRE - PHQ9
10. IF YOU CHECKED OFF ANY PROBLEMS, HOW DIFFICULT HAVE THESE PROBLEMS MADE IT FOR YOU TO DO YOUR WORK, TAKE CARE OF THINGS AT HOME, OR GET ALONG WITH OTHER PEOPLE: NOT DIFFICULT AT ALL
SUM OF ALL RESPONSES TO PHQ QUESTIONS 1-9: 0
SUM OF ALL RESPONSES TO PHQ QUESTIONS 1-9: 0

## 2018-06-13 NOTE — TELEPHONE ENCOUNTER
Trintellix 10 mg  Prescribed by previous provider.  Last office visit: 5/29/2018 with prescribing provider:  05/29/18   Sent PHQ-9 to be updated.  Future Office Visit:    Routing refill request to provider for review/approval because:  Medication is reported/historical  ROXANA Scott RN

## 2018-06-13 NOTE — LETTER
October 22, 2018      Nhung Concepcion  [address] [place] [id number]        Dear Nhung,       We care about your health and have reviewed your health plan including your medical conditions, medications, and lab results.  Based on this review, it is recommended that you follow up regarding the following health topic(s):  -Depression  -Anxiety    We recommend you take the following action(s):  -schedule a FOLLOWUP APPOINTMENT.   Your provider would like to see you in November.     Please call us at the [facility] - [phone] (or use [employer]) to address the above recommendations.     Thank you for trusting [facility] and we appreciate the opportunity to serve you.  We look forward to supporting your healthcare needs in the future.    Healthy Regards,    Your Health Care Team  [facility]

## 2018-06-14 ASSESSMENT — PATIENT HEALTH QUESTIONNAIRE - PHQ9: SUM OF ALL RESPONSES TO PHQ QUESTIONS 1-9: 0

## 2018-08-16 ENCOUNTER — OFFICE VISIT (OUTPATIENT)
Dept: PEDIATRICS | Facility: CLINIC | Age: 28
End: 2018-08-16
Payer: COMMERCIAL

## 2018-08-16 ENCOUNTER — MYC MEDICAL ADVICE (OUTPATIENT)
Dept: PEDIATRICS | Facility: CLINIC | Age: 28
End: 2018-08-16

## 2018-08-16 VITALS
OXYGEN SATURATION: 97 % | HEART RATE: 88 BPM | WEIGHT: 205.4 LBS | DIASTOLIC BLOOD PRESSURE: 66 MMHG | TEMPERATURE: 97.7 F | SYSTOLIC BLOOD PRESSURE: 108 MMHG | HEIGHT: 65 IN | BODY MASS INDEX: 34.22 KG/M2

## 2018-08-16 DIAGNOSIS — Z31.9 DESIRE FOR PREGNANCY: ICD-10-CM

## 2018-08-16 DIAGNOSIS — R06.02 SOB (SHORTNESS OF BREATH): Primary | ICD-10-CM

## 2018-08-16 DIAGNOSIS — Z12.4 SCREENING FOR MALIGNANT NEOPLASM OF CERVIX: ICD-10-CM

## 2018-08-16 DIAGNOSIS — R63.4 LOSS OF WEIGHT: ICD-10-CM

## 2018-08-16 DIAGNOSIS — F41.8 DEPRESSION WITH ANXIETY: ICD-10-CM

## 2018-08-16 DIAGNOSIS — R07.89 CHEST PRESSURE: ICD-10-CM

## 2018-08-16 DIAGNOSIS — K21.9 GASTROESOPHAGEAL REFLUX DISEASE WITHOUT ESOPHAGITIS: Primary | ICD-10-CM

## 2018-08-16 PROCEDURE — 94640 AIRWAY INHALATION TREATMENT: CPT | Performed by: NURSE PRACTITIONER

## 2018-08-16 PROCEDURE — G0145 SCR C/V CYTO,THINLAYER,RESCR: HCPCS | Performed by: NURSE PRACTITIONER

## 2018-08-16 PROCEDURE — 99214 OFFICE O/P EST MOD 30 MIN: CPT | Mod: 25 | Performed by: NURSE PRACTITIONER

## 2018-08-16 PROCEDURE — 93000 ELECTROCARDIOGRAM COMPLETE: CPT | Performed by: NURSE PRACTITIONER

## 2018-08-16 PROCEDURE — 87624 HPV HI-RISK TYP POOLED RSLT: CPT | Performed by: NURSE PRACTITIONER

## 2018-08-16 RX ORDER — OMEPRAZOLE 40 MG/1
40 CAPSULE, DELAYED RELEASE ORAL DAILY
Qty: 90 CAPSULE | Refills: 3 | Status: SHIPPED | OUTPATIENT
Start: 2018-08-16 | End: 2019-01-10

## 2018-08-16 ASSESSMENT — ANXIETY QUESTIONNAIRES
6. BECOMING EASILY ANNOYED OR IRRITABLE: NOT AT ALL
1. FEELING NERVOUS, ANXIOUS, OR ON EDGE: NOT AT ALL
IF YOU CHECKED OFF ANY PROBLEMS ON THIS QUESTIONNAIRE, HOW DIFFICULT HAVE THESE PROBLEMS MADE IT FOR YOU TO DO YOUR WORK, TAKE CARE OF THINGS AT HOME, OR GET ALONG WITH OTHER PEOPLE: NOT DIFFICULT AT ALL
3. WORRYING TOO MUCH ABOUT DIFFERENT THINGS: NOT AT ALL
5. BEING SO RESTLESS THAT IT IS HARD TO SIT STILL: NOT AT ALL
7. FEELING AFRAID AS IF SOMETHING AWFUL MIGHT HAPPEN: NOT AT ALL
2. NOT BEING ABLE TO STOP OR CONTROL WORRYING: NOT AT ALL
GAD7 TOTAL SCORE: 0

## 2018-08-16 ASSESSMENT — PATIENT HEALTH QUESTIONNAIRE - PHQ9: 5. POOR APPETITE OR OVEREATING: NOT AT ALL

## 2018-08-16 NOTE — MR AVS SNAPSHOT
After Visit Summary   8/16/2018    Nhung Concepcion    MRN: [id number]           Patient Information     Date Of Birth          1990        Visit Information        Provider Department      8/16/2018 9:40 AM Kimberly Navarro APRN [facility]        Today's Diagnoses     Gastroesophageal reflux disease without esophagitis    -  1    Depression with anxiety        Chest pressure        Loss of weight        Desire for pregnancy          Care Instructions    1. Re-start Omeprazole  2. Call for endoscopy          Follow-ups after your visit        Additional Services     GASTROENTEROLOGY ADULT REF PROCEDURE ONLY       Last Lab Result: Creatinine (mg/dL)       Date                     Value                 09/22/2017               0.89             ----------  Body mass index is 34.18 kg/(m^2).      Patient will be contacted to schedule procedure.     Please be aware that coverage of these services is subject to the terms and limitations of your health insurance plan.  Call member services at your health plan with any benefit or coverage questions.  Any procedures must be performed at a [place] facility OR coordinated by your clinic's referral office.    Please bring the following with you to your appointment:    (1) Any X-Rays, CTs or MRIs which have been performed.  Contact the facility where they were done to arrange for  prior to your scheduled appointment.    (2) List of current medications   (3) This referral request   (4) Any documents/labs given to you for this referral            INFERTILITY/AI REFERRAL       Your provider has referred you to: [facility] - [place] [phone]   [email or web address]  [facility] - [place] [phone]   [email or web address]    Please be aware that coverage of these services is subject to the terms and limitations of your health insurance plan.  Call member services  "at your health plan with any benefit or coverage questions.      Please bring the following with you to your appointment:    (1) Any X-Rays, CTs or MRIs which have been performed.  Contact the facility where they were done to arrange for  prior to your scheduled appointment.    (2) List of current medications   (3) This referral request   (4) Any documents/labs given to you for this referral                  Who to contact     If you have questions or need follow up information about today's clinic visit or your schedule please contact [facility] directly at [phone].  Normal or non-critical lab and imaging results will be communicated to you by POTATOSOFThart, letter or phone within 4 business days after the clinic has received the results. If you do not hear from us within 7 days, please contact the clinic through [employer] or phone. If you have a critical or abnormal lab result, we will notify you by phone as soon as possible.  Submit refill requests through [employer] or call your pharmacy and they will forward the refill request to us. Please allow 3 business days for your refill to be completed.          Additional Information About Your Visit        POTAT[employer]OF[employer]art Information     [employer] gives you secure access to your electronic health record. If you see a primary care provider, you can also send messages to your care team and make appointments. If you have questions, please call your primary care clinic.  If you do not have a primary care provider, please call [phone] and they will assist you.        Care EveryWhere ID     This is your Care EveryWhere ID. This could be used by other organizations to access your [place] medical records  [id number]        Your Vitals Were     Pulse Temperature Height Last Period Pulse Oximetry BMI (Body Mass Index)    88 97.7  F (36.5  C) (Tympanic) 5' 5\" (1.651 m) 07/30/2018 (Approximate) 97% 34.18 kg/m2       Blood Pressure from Last 3 Encounters:   08/16/18 " 108/66   05/29/18 122/76   04/18/18 129/84    Weight from Last 3 Encounters:   08/16/18 205 lb 6.4 oz (93.2 kg)   05/29/18 215 lb 4.8 oz (97.7 kg)   04/18/18 217 lb (98.4 kg)              We Performed the Following     ALBUTEROL/IPRATROPIUM 3ML NEB - .001     DEPRESSION ACTION PLAN (DAP)     EKG 12-lead complete w/read - Clinics     GASTROENTEROLOGY ADULT REF PROCEDURE ONLY     INFERTILITY/AI REFERRAL     INHALATION/NEBULIZER TREATMENT, INITIAL          Today's Medication Changes          These changes are accurate as of 8/16/18 10:34 AM.  If you have any questions, ask your nurse or doctor.               Stop taking these medicines if you haven't already. Please contact your care team if you have questions.     ranitidine 150 MG tablet   Commonly known as:  ZANTAC   Stopped by:  Kimberly Navarro APRN CNP                Where to get your medicines      These medications were sent to [facility] [address] [address]     Phone:  [phone]     omeprazole 40 MG capsule                Primary Care Provider Office Phone # Fax #    YEN Guo [phone]       [facility] [place] [address]        Equal Access to Services     [facility]: Hadii masood ku hadasho Soomaali, waaxda luqadaha, qaybta kaalmada adeegyada, waxay albertinain haydiane miller. So [facility] [phone].    ATENCIÓN: Si habla español, tiene a arboleda disposición servicios gratuitos de asistencia lingüística. Llame al [phone].    We comply with applicable federal civil rights laws and [place] laws. We do not discriminate on the basis of race, color, national origin, age, disability, sex, sexual orientation, or gender identity.            Thank you!     [facility]. Our goal is always to provide you with excellent care. Hearing back from our patients is one way  we can continue to improve our services. Please take a few minutes to complete the written survey that you may receive in the mail after your visit with us. Thank you!             Your Updated Medication List - Protect others around you: Learn how to safely use, store and throw away your medicines at www.disposemymeds.org.          This list is accurate as of 8/16/18 10:34 AM.  Always use your most recent med list.                   Brand Name Dispense Instructions for use Diagnosis    * nicotine 14 MG/24HR 24 hr patch    NICODERM CQ    14 patch    Place 1 patch onto the skin every 24 hours    Encounter for smoking cessation counseling       * nicotine 21 MG/24HR 24 hr patch    NICODERM CQ    30 patch    Place 1 patch onto the skin every 24 hours Take for 6 weeks then decrease to 14mg dose    Encounter for smoking cessation counseling       nicotine polacrilex 2 MG gum    CVS NICOTINE POLACRILEX    60 tablet    Place 1 each (2 mg) inside cheek as needed for smoking cessation    Encounter for smoking cessation counseling       * omeprazole 20 MG CR capsule    priLOSEC    90 capsule    Take 1 capsule (20 mg) by mouth daily    Gastroesophageal reflux disease without esophagitis       * omeprazole 40 MG capsule    priLOSEC    90 capsule    Take 1 capsule (40 mg) by mouth daily Take 30-60 minutes before a meal.    Gastroesophageal reflux disease without esophagitis       vortioxetine 10 MG tablet    TRINTELLIX    90 tablet    Take 1 tablet (10 mg) by mouth daily    Depression with anxiety       * Notice:  This list has 4 medication(s) that are the same as other medications prescribed for you. Read the directions carefully, and ask your doctor or other care provider to review them with you.

## 2018-08-16 NOTE — TELEPHONE ENCOUNTER
Patient seen in office appointment today.  Per note-She agrees to message me in a few hours and let me know if the nebulizer affected her symptoms at all as sometimes it is difficult to tell immediately after the treatment    Forwarded to provider for review.  ROXANA Scott RN

## 2018-08-16 NOTE — NURSING NOTE
The following nebulizer treatment was given:     MEDICATION: Duoneb  : Ritedose  LOT #: [id number]  EXPIRATION DATE:  4/30/20  NDC # 79922-473-97     Nebulizer Start Time:  10:25am  Nebulizer Stop Time:  10:30am  See Vital Signs Flowsheet  Magi Sanchez CMA

## 2018-08-16 NOTE — LETTER
My Depression Action Plan  Name: Nhung Concepcion   Date of Birth 1990  Date: 8/16/2018    My doctor: Kimberly Navarro   My clinic: [address]  [address]  [facility] [address] [phone]          GREEN    ZONE   Good Control    What it looks like:     Things are going generally well. You have normal up s and down s. You may even feel depressed from time to time, but bad moods usually last less than a day.   What you need to do:  1. Continue to care for yourself (see self care plan)  2. Check your depression survival kit and update it as needed  3. Follow your physician s recommendations including any medication.  4. Do not stop taking medication unless you consult with your physician first.           YELLOW         ZONE Getting Worse    What it looks like:     Depression is starting to interfere with your life.     It may be hard to get out of bed; you may be starting to isolate yourself from others.    Symptoms of depression are starting to last most all day and this has happened for several days.     You may have suicidal thoughts but they are not constant.   What you need to do:     1. Call your care team, your response to treatment will improve if you keep your care team informed of your progress. Yellow periods are signs an adjustment may need to be made.     2. Continue your self-care, even if you have to fake it!    3. Talk to someone in your support network    4. Open up your depression survival kit           RED    ZONE Medical Alert - Get Help    What it looks like:     Depression is seriously interfering with your life.     You may experience these or other symptoms: You can t get out of bed most days, can t work or engage in other necessary activities, you have trouble taking care of basic hygiene, or basic responsibilities, thoughts of suicide or death that will not go away, self-injurious behavior.     What you need to do:  1. Call  your care team and request a same-day appointment. If they are not available (weekends or after hours) call your local crisis line, emergency room or 911.            Depression Self Care Plan / Survival Kit    Self-Care for Depression  Here s the deal. Your body and mind are really not as separate as most people think.  What you do and think affects how you feel and how you feel influences what you do and think. This means if you do things that people who feel good do, it will help you feel better.  Sometimes this is all it takes.  There is also a place for medication and therapy depending on how severe your depression is, so be sure to consult with your medical provider and/ or Behavioral Health Consultant if your symptoms are worsening or not improving.     In order to better manage my stress, I will:    Exercise  Get some form of exercise, every day. This will help reduce pain and release endorphins, the  feel good  chemicals in your brain. This is almost as good as taking antidepressants!  This is not the same as joining a gym and then never going! (they count on that by the way ) It can be as simple as just going for a walk or doing some gardening, anything that will get you moving.      Hygiene   Maintain good hygiene (Get out of bed in the morning, Make your bed, Brush your teeth, Take a shower, and Get dressed like you were going to work, even if you are unemployed).  If your clothes don't fit try to get ones that do.    Diet  I will strive to eat foods that are good for me, drink plenty of water, and avoid excessive sugar, caffeine, alcohol, and other mood-altering substances.  Some foods that are helpful in depression are: complex carbohydrates, B vitamins, flaxseed, fish or fish oil, fresh fruits and vegetables.    Psychotherapy  I agree to participate in Individual Therapy (if recommended).    Medication  If prescribed medications, I agree to take them.  Missing doses can result in serious side effects.   I understand that drinking alcohol, or other illicit drug use, may cause potential side effects.  I will not stop my medication abruptly without first discussing it with my provider.    Staying Connected With Others  I will stay in touch with my friends, family members, and my primary care provider/team.    Use your imagination  Be creative.  We all have a creative side; it doesn t matter if it s oil painting, sand castles, or mud pies! This will also kick up the endorphins.    Witness Beauty  (AKA stop and smell the roses) Take a look outside, even in mid-winter. Notice colors, textures. Watch the squirrels and birds.     Service to others  Be of service to others.  There is always someone else in need.  By helping others we can  get out of ourselves  and remember the really important things.  This also provides opportunities for practicing all the other parts of the program.    Humor  Laugh and be silly!  Adjust your TV habits for less news and crime-drama and more comedy.    Control your stress  Try breathing deep, massage therapy, biofeedback, and meditation. Find time to relax each day.     My support system    Clinic Contact:  Phone number:    Contact 1:  Phone number:    Contact 2:  Phone number:    [facility]/:  Phone number:    Therapist:  Phone number:    [facility] crisis center:    Phone number:    Other community support:  Phone number:

## 2018-08-16 NOTE — PROGRESS NOTES
"  SUBJECTIVE:   Nhung Concepcion is a 28 year old female who presents to clinic today for the following health issues:    Patient wants pap done today.  AND  Medication Followup of omeprazole 40mg    Taking Medication as prescribed: yes    Side Effects:  None    Medication Helping Symptoms:  yes     ROS: const/gi/psych/cv/resp otherwise negative     OBJECTIVE:  /66 (BP Location: Right arm, Cuff Size: Adult Large)  Pulse 88  Temp 97.7  F (36.5  C) (Tympanic)  Ht 5' 5\" (1.651 m)  Wt 205 lb 6.4 oz (93.2 kg)  LMP 07/30/2018 (Approximate)  SpO2 97%  BMI 34.18 kg/m2  CONSTITUTIONAL: Alert, well-nourished, well-groomed, NAD  RESP: Lungs CTA. No wheeze, rhonchi, rales.  CV: HRRR S1 S2 No MRG. No peripheral edema   (female): normal female external genitalia, vaginal mucosa pink, moist, well rugated and normal cervix, adnexae, and uterus without masses. Normal vaginal discharge      ASSESSMENT/PLAN:  (K21.9) Gastroesophageal reflux disease without esophagitis  (primary encounter diagnosis)  Comment: Attempted weaning to ranitidine but she did not tolerate. Previous EGD with H pylori and ulcers, which were treated. Has had some mild unintentional weight loss, which she attributes to stopping alcohol.   Plan: omeprazole (PRILOSEC) 40 MG capsule,         GASTROENTEROLOGY ADULT REF PROCEDURE ONLY          -Re-start omeprazole.  -Need EGD.     (F41.8) Depression with anxiety  Comment: Stable.   Plan: DEPRESSION ACTION PLAN (DAP)        -Continue therapy     (R07.89) Chest pressure  Comment: Patient with 2 weeks of intermittent, non-exertional chest pressure and occasional difficulty taking a deep breath. No sharp chest pain or overt dyspnea. No palpitations, leg swelling, DVT sx. No risk factors for PE other than smoking (just quit a few weeks ago) but she has no hypoxia, tachycardia, or DVT sx. EKG is normal. No improvement with nebulizer so asthma less likely. Exam is otherwise negative. At this point I have low " suspicion for serious cardiopulmonary etiology. I think anxiety and stress is a possibility.   Plan: EKG 12-lead complete w/read - Clinics,         ALBUTEROL/IPRATROPIUM 3ML NEB - .001,         INHALATION/NEBULIZER TREATMENT, INITIAL          -Discussed potential etiologies with the patient. At this point she is comfortable monitoring for a few weeks and will let me know how she is feeling after that  -She agrees to message me in a few hours and let me know if the nebulizer affected her symptoms at all as sometimes it is difficult to tell immediately after the treatment  -Discussed signs of PE and other symptoms warranting a visit to the ER  -F/U OV 2 weeks.     (R63.4) Loss of weight  Comment: The patient has lost 18 lbs in the last 4 months, which is about an 8% decrease. This timing correlates to when she got sober from alcohol, so likely taking in significantly less calories. Has no hyperthyroid sx. No malignancy sx other than her ongoing GERD sx, for which she plans to have an endoscopy.   Plan:   -F/U OV 2 weeks. Will do more thorough history and monitor weight loss. Hopefully she will have had the endoscopy at that time as well.     (Z31.9) Desire for pregnancy  Comment: With female partner  Plan: INFERTILITY/AI REFERRAL              CASTILLO Dubois-LINO.

## 2018-08-17 ASSESSMENT — PATIENT HEALTH QUESTIONNAIRE - PHQ9: SUM OF ALL RESPONSES TO PHQ QUESTIONS 1-9: 0

## 2018-08-17 ASSESSMENT — ANXIETY QUESTIONNAIRES: GAD7 TOTAL SCORE: 0

## 2018-08-20 ENCOUNTER — HOSPITAL ENCOUNTER (OUTPATIENT)
Dept: CT IMAGING | Facility: CLINIC | Age: 28
Discharge: HOME | End: 2018-08-20
Attending: NURSE PRACTITIONER | Admitting: NURSE PRACTITIONER
Payer: COMMERCIAL

## 2018-08-20 DIAGNOSIS — R06.02 SOB (SHORTNESS OF BREATH): ICD-10-CM

## 2018-08-20 LAB
COPATH REPORT: NORMAL
PAP: NORMAL

## 2018-08-20 PROCEDURE — 25000128 H RX IP 250 OP 636: Performed by: NURSE PRACTITIONER

## 2018-08-20 PROCEDURE — 71260 CT THORAX DX C+: CPT

## 2018-08-20 RX ORDER — IOPAMIDOL 755 MG/ML
500 INJECTION, SOLUTION INTRAVASCULAR ONCE
Status: COMPLETED | OUTPATIENT
Start: 2018-08-20 | End: 2018-08-20

## 2018-08-20 RX ADMIN — SODIUM CHLORIDE 97 ML: 900 INJECTION, SOLUTION INTRAVENOUS at 17:21

## 2018-08-20 RX ADMIN — IOPAMIDOL 80 ML: 755 INJECTION, SOLUTION INTRAVENOUS at 17:21

## 2018-08-20 NOTE — TELEPHONE ENCOUNTER
Please help facilitate CT today or tomorrow. Please let patient know. Routed to MA/TC and triage pool

## 2018-08-20 NOTE — TELEPHONE ENCOUNTER
Left  for patient to call me back. I also sent her a [employer] message with the CT appointment information for her. It is scheduled at [facility] for today at 5 pm.

## 2018-08-20 NOTE — TELEPHONE ENCOUNTER
TC, please call radiology to help set up a CT scan for patient today or tomorrow then update patient.   Thank you!

## 2018-08-22 LAB
FINAL DIAGNOSIS: NORMAL
HPV HR 12 DNA CVX QL NAA+PROBE: NEGATIVE
HPV16 DNA SPEC QL NAA+PROBE: NEGATIVE
HPV18 DNA SPEC QL NAA+PROBE: NEGATIVE
SPECIMEN DESCRIPTION: NORMAL
SPECIMEN SOURCE CVX/VAG CYTO: NORMAL

## 2018-09-06 NOTE — TELEPHONE ENCOUNTER
ED / Discharge Outreach Protocol    Patient Contact    Attempt # 2    Was call answered?  No.  Left message on voicemail with information to call me back.    Leland, RN  Triage Nurse             Statement Selected

## 2018-10-04 ENCOUNTER — MYC MEDICAL ADVICE (OUTPATIENT)
Dept: PEDIATRICS | Facility: CLINIC | Age: 28
End: 2018-10-04

## 2018-10-04 DIAGNOSIS — Z71.6 ENCOUNTER FOR SMOKING CESSATION COUNSELING: ICD-10-CM

## 2018-10-04 NOTE — TELEPHONE ENCOUNTER
"Nicotine gum  Last Written Prescription Date:  05/29/18  Last Fill Quantity: 60,  # refills: 3   Last office visit: 8/16/2018 with prescribing provider:  08/16/18   Future Office Visit:    Requested Prescriptions   Pending Prescriptions Disp Refills     nicotine polacrilex ([employer] NICOTINE POLACRILEX) 2 MG gum 60 tablet 3     Sig: Place 1 each (2 mg) inside cheek as needed for smoking cessation    Partial Cholinergic Nicotinic Agonist Agents Passed    10/4/2018 10:29 AM       Passed - Blood pressure under 140/90 in past 12 months    BP Readings from Last 3 Encounters:   08/16/18 108/66   05/29/18 122/76   04/18/18 129/84                Passed - Recent (12 mo) or future (30 days) visit within the authorizing provider's specialty    Patient had office visit in the last 12 months or has a visit in the next 30 days with authorizing provider or within the authorizing provider's specialty.  See \"Patient Info\" tab in inbasket, or \"Choose Columns\" in Meds & Orders section of the refill encounter.           Passed - Patient is 18 years of age or older       Passed - Patient is not pregnant       Passed - No positive pregnancy test on file in past 12 months      Medication is being filled for 1 time refill only due to:  pt is overdue for physical exam and follow-up appt   ROXANA Scott RN    "

## 2018-10-21 ENCOUNTER — MYC MEDICAL ADVICE (OUTPATIENT)
Dept: PEDIATRICS | Facility: CLINIC | Age: 28
End: 2018-10-21

## 2018-10-21 DIAGNOSIS — Z71.6 ENCOUNTER FOR SMOKING CESSATION COUNSELING: ICD-10-CM

## 2018-11-05 ENCOUNTER — MYC MEDICAL ADVICE (OUTPATIENT)
Dept: PEDIATRICS | Facility: CLINIC | Age: 28
End: 2018-11-05

## 2018-11-05 DIAGNOSIS — Z71.6 ENCOUNTER FOR SMOKING CESSATION COUNSELING: ICD-10-CM

## 2018-11-05 NOTE — TELEPHONE ENCOUNTER
Discussed with Kimberly Navarro-patient may schedule just a follow-up appointment.  Patient notified thru My chart.  ROXANA Scott RN

## 2018-12-13 ENCOUNTER — MYC MEDICAL ADVICE (OUTPATIENT)
Dept: PEDIATRICS | Facility: CLINIC | Age: 28
End: 2018-12-13

## 2018-12-13 DIAGNOSIS — F41.8 DEPRESSION WITH ANXIETY: ICD-10-CM

## 2018-12-13 NOTE — TELEPHONE ENCOUNTER
"My chart message sent to patient to verify the pharmacy.  Trintellix 10 mg  Last Written Prescription Date:  06/14/18  Last Fill Quantity: 90,  # refills: 1   Last office visit: 8/16/2018 with prescribing provider:  08/16/18   Future Office Visit:      Requested Prescriptions   Pending Prescriptions Disp Refills     vortioxetine (TRINTELLIX) 10 MG tablet 90 tablet 1     Sig: Take 1 tablet (10 mg) by mouth daily    SSRIs Protocol Passed - 12/13/2018  8:19 AM       Passed - Recent (12 mo) or future (30 days) visit within the authorizing provider's specialty    Patient had office visit in the last 12 months or has a visit in the next 30 days with authorizing provider or within the authorizing provider's specialty.  See \"Patient Info\" tab in inbasket, or \"Choose Columns\" in Meds & Orders section of the refill encounter.             Passed - Patient is age 18 or older       Passed - No active pregnancy on record       Passed - No positive pregnancy test in last 12 months      Prescription approved per [facility] Refill Protocol.  ROXANA Scott RN    "

## 2018-12-31 ENCOUNTER — MYC MEDICAL ADVICE (OUTPATIENT)
Dept: PEDIATRICS | Facility: CLINIC | Age: 28
End: 2018-12-31

## 2018-12-31 DIAGNOSIS — Z71.6 ENCOUNTER FOR SMOKING CESSATION COUNSELING: ICD-10-CM

## 2019-01-10 ENCOUNTER — OFFICE VISIT (OUTPATIENT)
Dept: PEDIATRICS | Facility: CLINIC | Age: 29
End: 2019-01-10
Payer: COMMERCIAL

## 2019-01-10 VITALS
DIASTOLIC BLOOD PRESSURE: 74 MMHG | TEMPERATURE: 97.8 F | WEIGHT: 207.6 LBS | BODY MASS INDEX: 34.59 KG/M2 | HEART RATE: 84 BPM | SYSTOLIC BLOOD PRESSURE: 124 MMHG | HEIGHT: 65 IN | OXYGEN SATURATION: 97 %

## 2019-01-10 DIAGNOSIS — F41.8 DEPRESSION WITH ANXIETY: Primary | ICD-10-CM

## 2019-01-10 DIAGNOSIS — E66.01 MORBID OBESITY (H): ICD-10-CM

## 2019-01-10 DIAGNOSIS — K21.9 GASTROESOPHAGEAL REFLUX DISEASE WITHOUT ESOPHAGITIS: ICD-10-CM

## 2019-01-10 DIAGNOSIS — Z71.6 ENCOUNTER FOR SMOKING CESSATION COUNSELING: ICD-10-CM

## 2019-01-10 DIAGNOSIS — R73.9 ELEVATED BLOOD SUGAR: ICD-10-CM

## 2019-01-10 DIAGNOSIS — M25.50 MULTIPLE JOINT PAIN: ICD-10-CM

## 2019-01-10 DIAGNOSIS — F10.20 ALCOHOLISM (H): ICD-10-CM

## 2019-01-10 LAB
ERYTHROCYTE [DISTWIDTH] IN BLOOD BY AUTOMATED COUNT: 12.8 % (ref 10–15)
ERYTHROCYTE [SEDIMENTATION RATE] IN BLOOD BY WESTERGREN METHOD: 14 MM/H (ref 0–20)
HCT VFR BLD AUTO: 37.1 % (ref 35–47)
HGB BLD-MCNC: 12 G/DL (ref 11.7–15.7)
MCH RBC QN AUTO: 29.2 PG (ref 26.5–33)
MCHC RBC AUTO-ENTMCNC: 32.3 G/DL (ref 31.5–36.5)
MCV RBC AUTO: 90 FL (ref 78–100)
PLATELET # BLD AUTO: 258 10E9/L (ref 150–450)
RBC # BLD AUTO: 4.11 10E12/L (ref 3.8–5.2)
WBC # BLD AUTO: 9.5 10E9/L (ref 4–11)

## 2019-01-10 PROCEDURE — 86038 ANTINUCLEAR ANTIBODIES: CPT | Performed by: NURSE PRACTITIONER

## 2019-01-10 PROCEDURE — 99000 SPECIMEN HANDLING OFFICE-LAB: CPT | Performed by: NURSE PRACTITIONER

## 2019-01-10 PROCEDURE — 86431 RHEUMATOID FACTOR QUANT: CPT | Performed by: NURSE PRACTITIONER

## 2019-01-10 PROCEDURE — 86200 CCP ANTIBODY: CPT | Performed by: NURSE PRACTITIONER

## 2019-01-10 PROCEDURE — 85027 COMPLETE CBC AUTOMATED: CPT | Performed by: NURSE PRACTITIONER

## 2019-01-10 PROCEDURE — 36415 COLL VENOUS BLD VENIPUNCTURE: CPT | Performed by: NURSE PRACTITIONER

## 2019-01-10 PROCEDURE — 99214 OFFICE O/P EST MOD 30 MIN: CPT | Performed by: NURSE PRACTITIONER

## 2019-01-10 PROCEDURE — 80053 COMPREHEN METABOLIC PANEL: CPT | Performed by: NURSE PRACTITIONER

## 2019-01-10 PROCEDURE — 85652 RBC SED RATE AUTOMATED: CPT | Performed by: NURSE PRACTITIONER

## 2019-01-10 PROCEDURE — 86747 PARVOVIRUS ANTIBODY: CPT | Mod: 90 | Performed by: NURSE PRACTITIONER

## 2019-01-10 PROCEDURE — 86039 ANTINUCLEAR ANTIBODIES (ANA): CPT | Performed by: NURSE PRACTITIONER

## 2019-01-10 PROCEDURE — 86140 C-REACTIVE PROTEIN: CPT | Performed by: NURSE PRACTITIONER

## 2019-01-10 PROCEDURE — 83036 HEMOGLOBIN GLYCOSYLATED A1C: CPT | Performed by: NURSE PRACTITIONER

## 2019-01-10 RX ORDER — OMEPRAZOLE 40 MG/1
40 CAPSULE, DELAYED RELEASE ORAL DAILY
Qty: 90 CAPSULE | Refills: 3 | Status: SHIPPED | OUTPATIENT
Start: 2019-01-10 | End: 2020-02-11

## 2019-01-10 ASSESSMENT — ANXIETY QUESTIONNAIRES
1. FEELING NERVOUS, ANXIOUS, OR ON EDGE: SEVERAL DAYS
2. NOT BEING ABLE TO STOP OR CONTROL WORRYING: SEVERAL DAYS
5. BEING SO RESTLESS THAT IT IS HARD TO SIT STILL: NOT AT ALL
3. WORRYING TOO MUCH ABOUT DIFFERENT THINGS: SEVERAL DAYS
IF YOU CHECKED OFF ANY PROBLEMS ON THIS QUESTIONNAIRE, HOW DIFFICULT HAVE THESE PROBLEMS MADE IT FOR YOU TO DO YOUR WORK, TAKE CARE OF THINGS AT HOME, OR GET ALONG WITH OTHER PEOPLE: NOT DIFFICULT AT ALL
6. BECOMING EASILY ANNOYED OR IRRITABLE: SEVERAL DAYS
7. FEELING AFRAID AS IF SOMETHING AWFUL MIGHT HAPPEN: NOT AT ALL
GAD7 TOTAL SCORE: 4

## 2019-01-10 ASSESSMENT — PATIENT HEALTH QUESTIONNAIRE - PHQ9
SUM OF ALL RESPONSES TO PHQ QUESTIONS 1-9: 4
5. POOR APPETITE OR OVEREATING: NOT AT ALL

## 2019-01-10 ASSESSMENT — MIFFLIN-ST. JEOR: SCORE: 1657.55

## 2019-01-10 NOTE — PROGRESS NOTES
"  SUBJECTIVE:   Nhung Concepcion is a 29 year old female who presents to clinic today for the following health issues:    Patient here for medication review.    Medication Followup of Nicotine Gum    Taking Medication as prescribed: yes    Side Effects:  None    Medication Helping Symptoms:  yes     Depression and Anxiety Follow-Up    Status since last visit: No change    Other associated symptoms:None    Complicating factors:     Significant life event: No new stress    Current substance abuse: None    PHQ 6/13/2018 8/16/2018 1/10/2019   PHQ-9 Total Score 0 0 4   Q9: Suicide Ideation Not at all Not at all Not at all     CHANDNI-7 SCORE 4/10/2017 8/16/2018 1/10/2019   Total Score 0 0 4     In the past two weeks have you had thoughts of suicide or self-harm?  No.    Do you have concerns about your personal safety or the safety of others?   No  PHQ-9  English  PHQ-9   Any Language  CHANDNI-7  Suicide Assessment Five-step Evaluation and Treatment (SAFE-T)    Amount of exercise or physical activity: 2-3 days/week for an average of 15-30 minutes    Problems taking medications regularly: No    Medication side effects: none    Diet: regular (no restrictions)    -------------------------------------    ROS: const/psych/gi/resp/msk otherwise negative     OBJECTIVE:  /74 (BP Location: Right arm, Cuff Size: Adult Large)   Pulse 84   Temp 97.8  F (36.6  C) (Tympanic)   Ht 1.651 m (5' 5\")   Wt 94.2 kg (207 lb 9.6 oz)   SpO2 97%   BMI 34.55 kg/m    CONSTITUTIONAL: Alert, well-nourished, well-groomed, NAD  RESP: Lungs CTA. No wheeze, rhonchi, rales.  CV: HRRR S1 S2 No MRG. No peripheral edema  MSK: NO swollen joints  PSYCH: Bright affect. Appropriate mentation and speech.       ASSESSMENT/PLAN:  (F41.8) Depression with anxiety  (primary encounter diagnosis)  Comment: Improved control but still not at goal. Still in DBT and therapy  Plan: vortioxetine (TRINTELLIX/BRINTELLIX) 5 MG         tablet          -Increase trintellix from " 10 to 15  -Continue DBT  -F/U 1 month via [employer] update    (F10.20) Alcoholism (H)  Comment: Sober for 90 days. Congratulated her.   Plan: Comprehensive metabolic panel            (Z71.6) Encounter for smoking cessation counseling  Comment: No longer smoking or using the patch, just the gum.   Plan: nicotine ([employer] NICOTINE POLACRILEX) 2 MG gum          -Refilled medication  -Discussed gradual taper. As she is having mental health issues I'm hesitant to taper too fast.  -Discussed risks of long term nicotine.    (K21.9) Gastroesophageal reflux disease without esophagitis  Comment: Ongoing, unable to wean off ranitidine. We could try again now that she is not using ETOH.   Plan: omeprazole (PRILOSEC) 40 MG DR capsule,         GASTROENTEROLOGY ADULT REF PROCEDURE ONLY          -Recommended endoscopy then will come up with a plan from there.     (M25.50) Multiple joint pain  Comment: Several months of multiple joint pain, upper and lower, worse with running. No swelling or redness. No personal or FMH autoimmune diseases. Not worse in morning, hands minimally involved. Low risk for autoimmune dz based on history and exam but pt requesting testing   Plan: Cyclic Citrullinated Peptide Antibody IgG,         Rheumatoid factor, Parvovirus B19 antibodies         IgG IgM, CBC with platelets, Anti Nuclear Rehana         IgG by IFA with Reflex, ESR: Erythrocyte         sedimentation rate, CRP, inflammation            (E66.01) Morbid obesity (H)  Comment: Discussed lifestyle changes. Consider referral to bariatric medicine once mental health is stable      CASTILLO Dubois-LINO.

## 2019-01-10 NOTE — PATIENT INSTRUCTIONS
1. Start taking 3, 5mg tabs (total of 15mg) trintellix  2. Ok to try CBD oil  3. Please get endoscopy before baby comes  4. Ok to continue nicotine gum

## 2019-01-11 LAB
CRP SERPL-MCNC: <2.9 MG/L (ref 0–8)
RHEUMATOID FACT SER NEPH-ACNC: <20 IU/ML (ref 0–20)

## 2019-01-11 ASSESSMENT — ANXIETY QUESTIONNAIRES: GAD7 TOTAL SCORE: 4

## 2019-01-12 LAB
ALBUMIN SERPL-MCNC: 3.9 G/DL (ref 3.4–5)
ALP SERPL-CCNC: 61 U/L (ref 40–150)
ALT SERPL W P-5'-P-CCNC: 23 U/L (ref 0–50)
ANION GAP SERPL CALCULATED.3IONS-SCNC: 8 MMOL/L (ref 3–14)
AST SERPL W P-5'-P-CCNC: 15 U/L (ref 0–45)
B19V IGG SER IA-ACNC: 0.15 IV
B19V IGM SER IA-ACNC: 0.19 IV
BILIRUB SERPL-MCNC: 0.2 MG/DL (ref 0.2–1.3)
BUN SERPL-MCNC: 13 MG/DL (ref 7–30)
CALCIUM SERPL-MCNC: 8.7 MG/DL (ref 8.5–10.1)
CHLORIDE SERPL-SCNC: 111 MMOL/L (ref 94–109)
CO2 SERPL-SCNC: 21 MMOL/L (ref 20–32)
CREAT SERPL-MCNC: 0.81 MG/DL (ref 0.52–1.04)
GFR SERPL CREATININE-BSD FRML MDRD: >90 ML/MIN/{1.73_M2}
GLUCOSE SERPL-MCNC: 142 MG/DL (ref 70–99)
POTASSIUM SERPL-SCNC: 3.9 MMOL/L (ref 3.4–5.3)
PROT SERPL-MCNC: 7 G/DL (ref 6.8–8.8)
SODIUM SERPL-SCNC: 140 MMOL/L (ref 133–144)

## 2019-01-14 ENCOUNTER — MYC MEDICAL ADVICE (OUTPATIENT)
Dept: PEDIATRICS | Facility: CLINIC | Age: 29
End: 2019-01-14

## 2019-01-14 PROBLEM — R76.8 POSITIVE ANA (ANTINUCLEAR ANTIBODY): Status: ACTIVE | Noted: 2019-01-14

## 2019-01-14 PROBLEM — R73.01 IMPAIRED FASTING GLUCOSE: Status: ACTIVE | Noted: 2019-01-14

## 2019-01-14 LAB
ANA PAT SER IF-IMP: ABNORMAL
ANA PAT SER IF-IMP: ABNORMAL
ANA SER QL IF: POSITIVE
ANA TITR SER IF: ABNORMAL {TITER}
ANA TITR SER IF: ABNORMAL {TITER}
CCP AB SER IA-ACNC: 1 U/ML
HBA1C MFR BLD: 5.7 % (ref 0–5.6)

## 2019-01-15 NOTE — TELEPHONE ENCOUNTER
Patient has appt eder/ Kimberly Navarro on 1/28/19.  Attempted call to patient to see if she wants to be seen earlier - unable to leave .  Sent [employer] message.  Magi Sanchez CMA

## 2019-02-09 ENCOUNTER — HOSPITAL ENCOUNTER (EMERGENCY)
Facility: CLINIC | Age: 29
Discharge: HOME | End: 2019-02-09
Attending: FAMILY MEDICINE | Admitting: FAMILY MEDICINE
Payer: OTHER MISCELLANEOUS

## 2019-02-09 VITALS
BODY MASS INDEX: 33.32 KG/M2 | WEIGHT: 200 LBS | SYSTOLIC BLOOD PRESSURE: 137 MMHG | RESPIRATION RATE: 16 BRPM | HEART RATE: 83 BPM | HEIGHT: 65 IN | OXYGEN SATURATION: 98 % | TEMPERATURE: 98.3 F | DIASTOLIC BLOOD PRESSURE: 68 MMHG

## 2019-02-09 DIAGNOSIS — Z20.9 EXPOSURE TO BIOLOGICAL AGENT: ICD-10-CM

## 2019-02-09 LAB
ALBUMIN SERPL-MCNC: 3.8 G/DL (ref 3.4–5)
ALP SERPL-CCNC: 73 U/L (ref 40–150)
ALT SERPL W P-5'-P-CCNC: 33 U/L (ref 0–50)
ANION GAP SERPL CALCULATED.3IONS-SCNC: 8 MMOL/L (ref 3–14)
AST SERPL W P-5'-P-CCNC: 21 U/L (ref 0–45)
BASOPHILS # BLD AUTO: 0 10E9/L (ref 0–0.2)
BASOPHILS NFR BLD AUTO: 0.4 %
BILIRUB SERPL-MCNC: 0.2 MG/DL (ref 0.2–1.3)
BUN SERPL-MCNC: 11 MG/DL (ref 7–30)
CALCIUM SERPL-MCNC: 8.8 MG/DL (ref 8.5–10.1)
CHLORIDE SERPL-SCNC: 108 MMOL/L (ref 94–109)
CO2 SERPL-SCNC: 24 MMOL/L (ref 20–32)
CREAT SERPL-MCNC: 0.74 MG/DL (ref 0.52–1.04)
DIFFERENTIAL METHOD BLD: NORMAL
EOSINOPHIL # BLD AUTO: 0.2 10E9/L (ref 0–0.7)
EOSINOPHIL NFR BLD AUTO: 1.9 %
ERYTHROCYTE [DISTWIDTH] IN BLOOD BY AUTOMATED COUNT: 13.7 % (ref 10–15)
GFR SERPL CREATININE-BSD FRML MDRD: >90 ML/MIN/{1.73_M2}
GLUCOSE SERPL-MCNC: 108 MG/DL (ref 70–99)
HCT VFR BLD AUTO: 37.7 % (ref 35–47)
HGB BLD-MCNC: 12.6 G/DL (ref 11.7–15.7)
IMM GRANULOCYTES # BLD: 0 10E9/L (ref 0–0.4)
IMM GRANULOCYTES NFR BLD: 0.2 %
LYMPHOCYTES # BLD AUTO: 2.7 10E9/L (ref 0.8–5.3)
LYMPHOCYTES NFR BLD AUTO: 31.4 %
MCH RBC QN AUTO: 30.2 PG (ref 26.5–33)
MCHC RBC AUTO-ENTMCNC: 33.4 G/DL (ref 31.5–36.5)
MCV RBC AUTO: 90 FL (ref 78–100)
MONOCYTES # BLD AUTO: 0.5 10E9/L (ref 0–1.3)
MONOCYTES NFR BLD AUTO: 6.3 %
NEUTROPHILS # BLD AUTO: 5.1 10E9/L (ref 1.6–8.3)
NEUTROPHILS NFR BLD AUTO: 59.8 %
NRBC # BLD AUTO: 0 10*3/UL
NRBC BLD AUTO-RTO: 0 /100
PLATELET # BLD AUTO: 268 10E9/L (ref 150–450)
POTASSIUM SERPL-SCNC: 3.6 MMOL/L (ref 3.4–5.3)
PROT SERPL-MCNC: 7.3 G/DL (ref 6.8–8.8)
RBC # BLD AUTO: 4.17 10E12/L (ref 3.8–5.2)
SODIUM SERPL-SCNC: 140 MMOL/L (ref 133–144)
WBC # BLD AUTO: 8.5 10E9/L (ref 4–11)

## 2019-02-09 PROCEDURE — 99283 EMERGENCY DEPT VISIT LOW MDM: CPT | Mod: Z6 | Performed by: FAMILY MEDICINE

## 2019-02-09 PROCEDURE — 85025 COMPLETE CBC W/AUTO DIFF WBC: CPT | Performed by: FAMILY MEDICINE

## 2019-02-09 PROCEDURE — 80053 COMPREHEN METABOLIC PANEL: CPT | Performed by: FAMILY MEDICINE

## 2019-02-09 PROCEDURE — 99283 EMERGENCY DEPT VISIT LOW MDM: CPT | Performed by: FAMILY MEDICINE

## 2019-02-09 RX ORDER — RIFAMPIN 300 MG/1
600 CAPSULE ORAL DAILY
Qty: 42 CAPSULE | Refills: 0 | Status: SHIPPED | OUTPATIENT
Start: 2019-02-09 | End: 2019-06-14

## 2019-02-09 RX ORDER — DOXYCYCLINE 100 MG/1
100 CAPSULE ORAL 2 TIMES DAILY
Qty: 42 CAPSULE | Refills: 0 | Status: SHIPPED | OUTPATIENT
Start: 2019-02-09 | End: 2019-06-14

## 2019-02-09 ASSESSMENT — ENCOUNTER SYMPTOMS
EYE REDNESS: 0
ARTHRALGIAS: 0
HEADACHES: 0
CONFUSION: 0
SHORTNESS OF BREATH: 0
FEVER: 0
DIFFICULTY URINATING: 0
ABDOMINAL PAIN: 0
NECK STIFFNESS: 0
COLOR CHANGE: 0

## 2019-02-09 ASSESSMENT — MIFFLIN-ST. JEOR: SCORE: 1623.07

## 2019-02-09 NOTE — ED AVS SNAPSHOT
[facility], [place], Emergency Department  [address] [place] [id number]  Phone:  [phone]                                    Nhung Concepcion   MRN: [id number]    Department:  [facility], Emergency Department   Date of Visit:  2/9/2019           After Visit Summary Signature Page    I have received my discharge instructions, and my questions have been answered. I have discussed any challenges I see with this plan with the nurse or doctor.    ..........................................................................................................................................  Patient/Patient Representative Signature      ..........................................................................................................................................  Patient Representative Print Name and Relationship to Patient    ..................................................               ................................................  Date                                   Time    ..........................................................................................................................................  Reviewed by Signature/Title    ...................................................              ..............................................  Date                                               Time          22EPIC Rev 08/18

## 2019-02-10 NOTE — ED PROVIDER NOTES
[place] EMERGENCY DEPARTMENT ([facility])  2/09/19   ED 4    6:03 PM   History     Chief Complaint   Patient presents with     Body Fluid Exposure     The history is provided by the patient and medical records.     Nhung Concepcion is a 29 year old female who presents after possible exposure to brucellosis. This is a work related incident. Patient works in a microbiology lab with culture plates. One of the doctors had wanted an update on one of the culture plates. Staff opened one of the culture plates growing brucellosis outside the carr which may have exposed them to a biologic agent. She was instructed to come to the ER for post-exposure evaluation. Patient states she and 2 other coworkers were exposed. She denies any symptoms at this time.     Patient has an allergy to sulfa, no other drug allergies.     I have reviewed the Medications, Allergies, Past Medical and Surgical History, and Social History in the [employer] system.  PAST MEDICAL HISTORY:   Past Medical History:   Diagnosis Date     Anxiety      Depressive disorder        PAST SURGICAL HISTORY:   Past Surgical History:   Procedure Laterality Date     ORTHOPEDIC SURGERY      right arm surgery       FAMILY HISTORY:   Family History   Problem Relation Age of Onset     Breast Cancer Maternal Grandmother        SOCIAL HISTORY:   Social History     Tobacco Use     Smoking status: Current Every Day Smoker     Packs/day: 0.50     Types: Cigarettes     Smokeless tobacco: Never Used   Substance Use Topics     Alcohol use: Yes     Comment: 2 times a week          Medication List      START taking these medications    doxycycline hyclate 100 MG capsule  Commonly known as:  VIBRAMYCIN  Take 1 capsule (100 mg) by mouth 2 times daily for 21 days     rifampin 300 MG capsule  Commonly known as:  RIFADIN  Take 2 capsules (600 mg) by mouth daily for 21 days        ASK your doctor about these medications    * nicotine 14 MG/24HR 24 hr patch  Commonly known as:   "NICODERM CQ  Place 1 patch onto the skin every 24 hours     * nicotine 7 MG/24HR 24 hr patch  Commonly known as:  [employer] NICOTINE  Place 1 patch onto the skin every 24 hours     nicotine 2 MG gum  Commonly known as:  [employer] NICOTINE POLACRILEX  Place 1 each (2 mg) inside cheek as needed for smoking cessation     omeprazole 40 MG DR capsule  Commonly known as:  priLOSEC  Take 1 capsule (40 mg) by mouth daily Take 30-60 minutes before a meal.     vortioxetine 5 MG tablet  Commonly known as:  TRINTELLIX  Take 3 tablets (15 mg) by mouth daily         * This list has 2 medication(s) that are the same as other medications prescribed for you. Read the directions carefully, and ask your doctor or other care provider to review them with you.               Where to Get Your Medications      You can get these medications from any pharmacy    Bring a paper prescription for each of these medications    doxycycline hyclate 100 MG capsule    rifampin 300 MG capsule            Allergies   Allergen Reactions     Varenicline      Other reaction(s): Nightmares     Wellbutrin [Bupropion]      History of seizure activity while treated with Wellbutrin     Sulfa Drugs Rash        Review of Systems   Constitutional: Negative for fever.   HENT: Negative for congestion.    Eyes: Negative for redness.   Respiratory: Negative for shortness of breath.    Cardiovascular: Negative for chest pain.   Gastrointestinal: Negative for abdominal pain.   Genitourinary: Negative for difficulty urinating.   Musculoskeletal: Negative for arthralgias and neck stiffness.   Skin: Negative for color change.   Neurological: Negative for headaches.   Psychiatric/Behavioral: Negative for confusion.   All other systems reviewed and are negative.      Physical Exam   BP: 137/68  Pulse: 83  Temp: 98.3  F (36.8  C)  Resp: 16  Height: 165.1 cm (5' 5\")  Weight: 90.7 kg (200 lb)  SpO2: 98 %      Physical Exam   Constitutional: She is oriented to person, place, and time. She " appears well-developed and well-nourished. No distress.   HENT:   Head: Normocephalic and atraumatic.   Eyes: No scleral icterus.   Neck: Normal range of motion. Neck supple.   Cardiovascular: Normal rate.   Pulmonary/Chest: No respiratory distress.   Neurological: She is alert and oriented to person, place, and time.   Skin: Skin is warm and dry. No rash noted. She is not diaphoretic. No erythema. No pallor.   Psychiatric: She has a normal mood and affect. Her behavior is normal. Judgment and thought content normal.   Nursing note and vitals reviewed.      ED Course        Procedures         I discussed with infectious disease prior to arrival he went through the recent recommendations for post exposure prophylaxis with patient's current situation.    Current recommendations are for doxycycline 100 mg twice daily for 3 weeks along with this rifampin 600 mg daily for 3 weeks.    We did do CBC CMP for baseline labs at this point patient otherwise will follow up Monday with either infectious disease infection control or employee health patient should return for concerns at this point no concern for patient being contagious to others around her.       Critical Care time:  none             Labs Ordered and Resulted from Time of ED Arrival Up to the Time of Departure from the ED   COMPREHENSIVE METABOLIC PANEL - Abnormal; Notable for the following components:       Result Value    Glucose 108 (*)     All other components within normal limits   CBC WITH PLATELETS DIFFERENTIAL     Results for orders placed or performed during the hospital encounter of 02/09/19   Comprehensive metabolic panel   Result Value Ref Range    Sodium 140 133 - 144 mmol/L    Potassium 3.6 3.4 - 5.3 mmol/L    Chloride 108 94 - 109 mmol/L    Carbon Dioxide 24 20 - 32 mmol/L    Anion Gap 8 3 - 14 mmol/L    Glucose 108 (H) 70 - 99 mg/dL    Urea Nitrogen 11 7 - 30 mg/dL    Creatinine 0.74 0.52 - 1.04 mg/dL    GFR Estimate >90 >60 mL/min/[1.73_m2]    GFR  Estimate If Black >90 >60 mL/min/[1.73_m2]    Calcium 8.8 8.5 - 10.1 mg/dL    Bilirubin Total 0.2 0.2 - 1.3 mg/dL    Albumin 3.8 3.4 - 5.0 g/dL    Protein Total 7.3 6.8 - 8.8 g/dL    Alkaline Phosphatase 73 40 - 150 U/L    ALT 33 0 - 50 U/L    AST 21 0 - 45 U/L   CBC with platelets differential   Result Value Ref Range    WBC 8.5 4.0 - 11.0 10e9/L    RBC Count 4.17 3.8 - 5.2 10e12/L    Hemoglobin 12.6 11.7 - 15.7 g/dL    Hematocrit 37.7 35.0 - 47.0 %    MCV 90 78 - 100 fl    MCH 30.2 26.5 - 33.0 pg    MCHC 33.4 31.5 - 36.5 g/dL    RDW 13.7 10.0 - 15.0 %    Platelet Count 268 150 - 450 10e9/L    Diff Method Automated Method     % Neutrophils 59.8 %    % Lymphocytes 31.4 %    % Monocytes 6.3 %    % Eosinophils 1.9 %    % Basophils 0.4 %    % Immature Granulocytes 0.2 %    Nucleated RBCs 0 0 /100    Absolute Neutrophil 5.1 1.6 - 8.3 10e9/L    Absolute Lymphocytes 2.7 0.8 - 5.3 10e9/L    Absolute Monocytes 0.5 0.0 - 1.3 10e9/L    Absolute Eosinophils 0.2 0.0 - 0.7 10e9/L    Absolute Basophils 0.0 0.0 - 0.2 10e9/L    Abs Immature Granulocytes 0.0 0 - 0.4 10e9/L    Absolute Nucleated RBC 0.0             Assessments & Plan (with Medical Decision Making)  29-year-old female who works in the micro lab presents ER for prophylaxis against possible exposure to brucellosis.  Apparently 1 of the plates were open up outside of the carr of a patient who is positive for stenosis in the hospital.  Because of potential exposure recommendations discussed with infectious disease are to treat patient with doxycycline and rifampin for 3 weeks as noted above.  Patient will follow up on Monday with infectious disease infection control and employee health to return if any concerns baseline laboratory CBC chemistries were normal including liver function test.         I have reviewed the nursing notes.    I have reviewed the findings, diagnosis, plan and need for follow up with the patient.       Medication List      Started    doxycycline  hyclate 100 MG capsule  Commonly known as:  VIBRAMYCIN  100 mg, Oral, 2 TIMES DAILY     rifampin 300 MG capsule  Commonly known as:  RIFADIN  600 mg, Oral, DAILY            Final diagnoses:   Exposure to biological agent - brucella lab exposure   IErin, am serving as a trained medical scribe to document services personally performed by León Cohen MD based on the provider's statements to me on February 9, 2019.  This document has been checked and approved by the attending provider.    ILeón MD, was physically present and have reviewed and verified the accuracy of this note documented by Erin Vega, medical scribe.         2/9/2019   [facility], [place], EMERGENCY DEPARTMENT     León Cohen MD  02/09/19 [id number]

## 2019-02-10 NOTE — DISCHARGE INSTRUCTIONS
Home.  Take the doxycycline 100mg twice a day for 3 weeks.  Take the rifampin 600mg daily for 3 weeks.  Follow up with Employee Health and Infection Control Monday at the hospital for further recommendations.

## 2019-02-19 ENCOUNTER — MYC MEDICAL ADVICE (OUTPATIENT)
Dept: PEDIATRICS | Facility: CLINIC | Age: 29
End: 2019-02-19

## 2019-02-20 ENCOUNTER — E-VISIT (OUTPATIENT)
Dept: PEDIATRICS | Facility: CLINIC | Age: 29
End: 2019-02-20
Payer: OTHER MISCELLANEOUS

## 2019-02-20 DIAGNOSIS — R11.0 NAUSEA: Primary | ICD-10-CM

## 2019-02-20 PROCEDURE — 99444 ZZC PHYSICIAN ONLINE EVALUATION & MANAGEMENT SERVICE: CPT | Performed by: NURSE PRACTITIONER

## 2019-02-21 RX ORDER — ONDANSETRON 4 MG/1
4 TABLET, ORALLY DISINTEGRATING ORAL EVERY 8 HOURS PRN
Qty: 21 TABLET | Refills: 1 | Status: SHIPPED | OUTPATIENT
Start: 2019-02-21 | End: 2019-05-15

## 2019-02-21 NOTE — TELEPHONE ENCOUNTER
(R11.0) Nausea  (primary encounter diagnosis)  Comment: Nausea secondary to antibiotic use.   Plan: ondansetron (ZOFRAN-ODT) 4 MG ODT tab

## 2019-03-25 ENCOUNTER — HOSPITAL ENCOUNTER (OUTPATIENT)
Facility: CLINIC | Age: 29
Discharge: HOME | End: 2019-03-25
Attending: INTERNAL MEDICINE | Admitting: INTERNAL MEDICINE
Payer: COMMERCIAL

## 2019-03-25 VITALS
WEIGHT: 195 LBS | HEIGHT: 65 IN | RESPIRATION RATE: 14 BRPM | DIASTOLIC BLOOD PRESSURE: 77 MMHG | HEART RATE: 63 BPM | SYSTOLIC BLOOD PRESSURE: 111 MMHG | BODY MASS INDEX: 32.49 KG/M2 | OXYGEN SATURATION: 96 %

## 2019-03-25 LAB — UPPER GI ENDOSCOPY: NORMAL

## 2019-03-25 PROCEDURE — 43239 EGD BIOPSY SINGLE/MULTIPLE: CPT | Performed by: INTERNAL MEDICINE

## 2019-03-25 PROCEDURE — 25000125 ZZHC RX 250: Performed by: INTERNAL MEDICINE

## 2019-03-25 PROCEDURE — 43235 EGD DIAGNOSTIC BRUSH WASH: CPT | Performed by: INTERNAL MEDICINE

## 2019-03-25 PROCEDURE — 88305 TISSUE EXAM BY PATHOLOGIST: CPT | Mod: 26 | Performed by: INTERNAL MEDICINE

## 2019-03-25 PROCEDURE — 25000128 H RX IP 250 OP 636: Performed by: INTERNAL MEDICINE

## 2019-03-25 PROCEDURE — 40000104 ZZH STATISTIC MODERATE SEDATION < 10 MIN: Performed by: INTERNAL MEDICINE

## 2019-03-25 PROCEDURE — 88305 TISSUE EXAM BY PATHOLOGIST: CPT | Performed by: INTERNAL MEDICINE

## 2019-03-25 RX ORDER — LIDOCAINE 40 MG/G
CREAM TOPICAL
Status: DISCONTINUED | OUTPATIENT
Start: 2019-03-25 | End: 2019-03-25 | Stop reason: HOSPADM

## 2019-03-25 RX ORDER — NALOXONE HYDROCHLORIDE 0.4 MG/ML
.1-.4 INJECTION, SOLUTION INTRAMUSCULAR; INTRAVENOUS; SUBCUTANEOUS
Status: DISCONTINUED | OUTPATIENT
Start: 2019-03-25 | End: 2019-03-25 | Stop reason: HOSPADM

## 2019-03-25 RX ORDER — ONDANSETRON 4 MG/1
4 TABLET, ORALLY DISINTEGRATING ORAL EVERY 6 HOURS PRN
Status: DISCONTINUED | OUTPATIENT
Start: 2019-03-25 | End: 2019-03-25 | Stop reason: HOSPADM

## 2019-03-25 RX ORDER — ONDANSETRON 2 MG/ML
4 INJECTION INTRAMUSCULAR; INTRAVENOUS
Status: DISCONTINUED | OUTPATIENT
Start: 2019-03-25 | End: 2019-03-25 | Stop reason: HOSPADM

## 2019-03-25 RX ORDER — DIPHENHYDRAMINE HYDROCHLORIDE 50 MG/ML
INJECTION INTRAMUSCULAR; INTRAVENOUS PRN
Status: DISCONTINUED | OUTPATIENT
Start: 2019-03-25 | End: 2019-03-25 | Stop reason: HOSPADM

## 2019-03-25 RX ORDER — FENTANYL CITRATE 50 UG/ML
INJECTION, SOLUTION INTRAMUSCULAR; INTRAVENOUS PRN
Status: DISCONTINUED | OUTPATIENT
Start: 2019-03-25 | End: 2019-03-25 | Stop reason: HOSPADM

## 2019-03-25 RX ORDER — ONDANSETRON 2 MG/ML
4 INJECTION INTRAMUSCULAR; INTRAVENOUS EVERY 6 HOURS PRN
Status: DISCONTINUED | OUTPATIENT
Start: 2019-03-25 | End: 2019-03-25 | Stop reason: HOSPADM

## 2019-03-25 RX ORDER — FLUMAZENIL 0.1 MG/ML
0.2 INJECTION, SOLUTION INTRAVENOUS
Status: DISCONTINUED | OUTPATIENT
Start: 2019-03-25 | End: 2019-03-25 | Stop reason: HOSPADM

## 2019-03-25 ASSESSMENT — MIFFLIN-ST. JEOR: SCORE: 1600.39

## 2019-03-25 NOTE — LETTER
February 22, 2019      Nhung Concepcion  [address] [facility] [place] [address]        Dear Nhung,     Thank you for choosing [facility]. You are scheduled for the following service:    Date:   3/07/2019  Thursday      Procedure: UPPER ENDOSCOPY-EGD  Doctor: Dr. Shan Bell           Arrival Time:  2:00 pm    *check in at Emergency/Endoscopy desk*  Procedure Time: 2:30 pm       Location:   [facility]        Endoscopy Department, First Floor (Enter through ER Doors) *         [address] [place], [place] [address]      [phone] or [phone] ([facility] / [facility]) to reschedule          PRE-PROCEDURE CHECKLIST    If you have diabetes, ask your regular doctor for diet and medication restrictions.  If you take any antiplatelet or anticoagulant medications (such as Coumadin, Lovenox, Plavix, etc.) and have not already discussed this, please call your primary physician for advice on holding this medication.  If you take Aspirin, you may continue to do so.  If you are or may be pregnant, please discuss the risks and benefits of this procedure with your doctor.  You must arrange for a ride for the day of your exam. If you fail to arrange transportation with a responsible adult, your procedure will need to be cancelled and rescheduled. Taxi, bus and medical transport are not acceptable unless you have a responsible adult that you know & trust with you. Please arrange for this  to be able to pick you up in our department, approximately one hour after your scheduled procedure, if they are not able to stay with you.      Canceling or rescheduling   If you must cancel or reschedule your appointment, please call [phone] as soon as possible.      Upper Endoscopy or Esophagogastroduodenoscopy (EGD) is a test performed to evaluate symptoms of persistent abdominal pain, nausea, vomiting and difficulty swallowing. It may also be used to treat various  conditions of the upper gastrointestinal tract, such as bleeding, narrowing or abnormal growths.     What happens during an upper endoscopy?  On the day of your procedure, plan to spend up to one and a half hour after your arrival at the endoscopy center. The exam itself takes about 5 to 10 minutes.    Before the exam:  - You will change into a gown.   - Your medical history and medication list will be reviewed with you, unless it has already been done over the phone.   - A nurse will insert an intravenous (IV) line into your hand or arm.  - The doctor will talk to you and give you a consent form to sign.    During the exam:  - Medicine will be given through the IV line to help you relax and feel comfortable.   - Your heart rate and oxygen levels will be monitored. If your blood pressure is low, you may be given fluids through the IV line.   - The doctor will insert a flexible, hollow tube, called an endoscope, into your mouth and will advance it slowly through the esophagus, stomach and duodenum (the first part of your small intestine).   - You may have a feeling of pressure or fullness.   - If you have difficulty swallowing, and the doctor finds a narrowing in your esophagus, it may be possible for the area to be expanded-dilated during the exam.   - If abnormal tissue is found, the doctor may remove it through the endoscope (biopsy it) for closer examination. The tissue removal is painless.    After the exam:  - Any tissue samples removed during the exam will be sent to a lab for evaluation. It may take 5 to 7 working days for you to be notified of the results  - The doctor will prepare a full report for the physician who referred you for the upper endoscopy.   - The doctor will talk with you about the initial results of your exam.   - You may feel bloated after the procedure. That is normal and should not last long.   - Your throat may feel sore for a short time.   - Following the exam, you may resume your  normal diet. Avoid alcohol until the next day.   - You may resume your regular activities the day after the procedure.   - Medication given during the exam will prohibit you from driving for the rest of the day.  - A nurse will provide you with complete discharge instructions before you leave the endoscopy center. Be sure to ask the nurse for specific instructions if you take blood thinners such as Aspirin , Coumadin , Lovenox , Plavix , etc.       PREPARATION    To ensure a successful exam, please follow all instructions carefully.      The night before your exam:    STOP eating solid foods at 11:45 pm.     Clear liquids are okay to drink (examples: Gatorade , apple juice, clear broth,coffee or tea without milk or cream, etc.).     DO NOT drink red liquids or alcoholic beverages.    The day of your exam:    STOP drinking clear liquids 4 hours before your exam.     You may take your usual medications with 4 oz. of water, but it needs to be at least 4 hours prior to your procedure.    When you leave for the procedure:    Bring a list of all of your current medications, including any allergy or over-the-counter medications, unless you have already reviewed that with an Endoscopy RN over the phone.     Bring a photo ID as well as up-to-date insurance information, such as your insurance card and any referral forms that might be required by your payer.         DIRECTIONS TO THE ENDOSCOPY DEPARTMENT    From the north ([facility])  Take 35W south, exit on [address] . Get into the left hand geovanny, turn left (east), go one-half mile to [address] and turn left. Go north to the first stoplight, take a right on [address] and follow it to the Emergency entrance.    From the south ([facility])  Take 35N to the 35E split and exit on [address] . On [address] , turn left (west) to [address]. Turn right (north) on [address]. Go north to the first stoplight, take a  right on [address] and follow it to the Emergency entrance.    From the east via 35E ([facility])  Take [address]E south to [address]  exit. Turn right on [address]. Go west to [address]. Turn right (north) on [address]. Go to the first stoplight, take a right and follow on [address] to the Emergency entrance.    From the east via [address] ([facility])  Take [address] to [address]. Turn left (south) on [address] to [address]. Turn left (east) on [address] and follow it to the Emergency entrance.    From the west via [address] ([place])  Take [address] east to [address]. Turn right (south) on [address] to [address]. Turn left (east) on [address] and follow it to the Emergency entrance.

## 2019-03-25 NOTE — PROGRESS NOTES
[facility]  Pre-Endoscopy History and Physical     Nhung Concepcion MRN# [id number]   YOB: 1990 Age: 29 year old   Today's Date: 03/25/2019    Date of Procedure: 3/25/2019  Primary care provider: Kimberly Navarro  Type of Endoscopy: colonoscopy and esophagogastroduodenoscopy (upper GI endoscopy)  Reason for Procedure: GERD  Type of Anesthesia Anticipated: Moderate (conscious) sedation    HPI:    Nhung is a 29 year old female who will be undergoing the above procedure.      A history and physical has been performed. The patient's medications and allergies have been reviewed. The risks and benefits of the procedure and the sedation options and risks were discussed with the patient.  All questions were answered and informed consent was obtained.      Allergies   Allergen Reactions     Varenicline      Other reaction(s): Nightmares     Wellbutrin [Bupropion]      History of seizure activity while treated with Wellbutrin     Sulfa Drugs Rash        Current Facility-Administered Medications   Medication     0.9% sodium chloride BOLUS     lidocaine (LMX4) kit     lidocaine 1 % 0.1-1 mL     ondansetron (ZOFRAN) injection 4 mg     sodium chloride (PF) 0.9% PF flush 3 mL     sodium chloride (PF) 0.9% PF flush 3 mL     sodium chloride (PF) 0.9% PF flush 3 mL       Patient Active Problem List   Diagnosis     Depression with anxiety     Gastroesophageal reflux disease without esophagitis     Suicide ideation     Morbid obesity (H)     Alcoholism (H)     Positive TODD (antinuclear antibody)     Impaired fasting glucose        Past Medical History:   Diagnosis Date     Anxiety      Depressive disorder         Past Surgical History:   Procedure Laterality Date     ORTHOPEDIC SURGERY      right arm surgery       Social History     Tobacco Use     Smoking status: Current Every Day Smoker     Packs/day: 0.50     Types: Cigarettes     Smokeless tobacco: Never Used   Substance Use Topics     Alcohol  "use: Yes     Comment: 2 times a week       Family History   Problem Relation Age of Onset     Breast Cancer Maternal Grandmother          PHYSICAL EXAM:   There were no vitals taken for this visit. Estimated body mass index is 33.28 kg/m  as calculated from the following:    Height as of 2/9/19: 1.651 m (5' 5\").    Weight as of 2/9/19: 90.7 kg (200 lb).   RESP: lungs clear to auscultation - no rales, rhonchi or wheezes  CV: regular rates and rhythm    IMPRESSION   ASA Class 3 - Severe systemic disease, but not incapacitating  Mallampati Score: 2      Sam Krueger MD                  "

## 2019-03-25 NOTE — DISCHARGE INSTRUCTIONS
"  Tips to Control Acid Reflux  To control acid reflux, you ll need to make some basic diet and lifestyle changes. The simple steps outlined below may be all you ll need to relieve discomfort.   Watch What You Eat      Avoid fatty foods and spicy foods.    Eat fewer acidic foods, such as citrus and tomato-based foods. These can increase symptoms.     Limit drinking alcohol, caffeine, and fizzy beverages. All increase acid reflux.    Try limiting chocolate, peppermint, and spearmint. These can worsen acid reflux in some people.    Watch When You Eat    Avoid lying down for 3 hours after eating.    Do not snack before going to bed.    Raise Your Head  Raising your head and upper body by 4\" to 6\" helps limit reflux when you re lying down. Put blocks under the head of the bed frame to raise it.                    [id number] [facility], [address] [place] [address], [place] [address]. All rights reserved. This information is not intended as a substitute for professional medical care. Always follow your healthcare professional's instructions.    H [employer]yl[employer] information.  "

## 2019-03-26 LAB — COPATH REPORT: NORMAL

## 2019-05-15 ENCOUNTER — MYC MEDICAL ADVICE (OUTPATIENT)
Dept: PEDIATRICS | Facility: CLINIC | Age: 29
End: 2019-05-15

## 2019-05-15 DIAGNOSIS — R11.0 NAUSEA: ICD-10-CM

## 2019-05-15 RX ORDER — ONDANSETRON 4 MG/1
4 TABLET, ORALLY DISINTEGRATING ORAL EVERY 8 HOURS PRN
Qty: 21 TABLET | Refills: 1 | Status: SHIPPED | OUTPATIENT
Start: 2019-05-15 | End: 2019-06-14

## 2019-05-15 NOTE — TELEPHONE ENCOUNTER
Pt sent a [employer] message requesting a refill, due to the stomach bug.    Pending Prescriptions:                       Disp   Refills    ondansetron (ZOFRAN-ODT) 4 MG ODT tab     21 tab*1            Sig: Take 1 tablet (4 mg) by mouth every 8 hours as           needed for nausea    Last Written Prescription Date:  2/21/19  Last Fill Quantity: 21,  # refills: 1   Last office visit: 1/10/2019 with prescribing provider:     Future Office Visit:      Routing refill request to provider for review/approval because:  Change in diagnosis.    Jewell White, RN - Triage  [facility]

## 2019-06-14 ENCOUNTER — OFFICE VISIT (OUTPATIENT)
Dept: PEDIATRICS | Facility: CLINIC | Age: 29
End: 2019-06-14
Payer: COMMERCIAL

## 2019-06-14 VITALS
BODY MASS INDEX: 32.4 KG/M2 | HEIGHT: 65 IN | TEMPERATURE: 98.5 F | WEIGHT: 194.5 LBS | HEART RATE: 72 BPM | SYSTOLIC BLOOD PRESSURE: 124 MMHG | OXYGEN SATURATION: 98 % | DIASTOLIC BLOOD PRESSURE: 84 MMHG

## 2019-06-14 DIAGNOSIS — L68.9 EXCESS BODY AND FACIAL HAIR: ICD-10-CM

## 2019-06-14 DIAGNOSIS — R11.0 NAUSEA: Primary | ICD-10-CM

## 2019-06-14 DIAGNOSIS — N92.6 IRREGULAR MENSES: ICD-10-CM

## 2019-06-14 DIAGNOSIS — F41.8 DEPRESSION WITH ANXIETY: ICD-10-CM

## 2019-06-14 PROCEDURE — 99214 OFFICE O/P EST MOD 30 MIN: CPT | Performed by: NURSE PRACTITIONER

## 2019-06-14 RX ORDER — ONDANSETRON 4 MG/1
4 TABLET, ORALLY DISINTEGRATING ORAL EVERY 8 HOURS PRN
Qty: 21 TABLET | Refills: 1 | Status: SHIPPED | OUTPATIENT
Start: 2019-06-14 | End: 2023-02-22

## 2019-06-14 ASSESSMENT — MIFFLIN-ST. JEOR: SCORE: 1598.13

## 2019-06-14 ASSESSMENT — ANXIETY QUESTIONNAIRES
1. FEELING NERVOUS, ANXIOUS, OR ON EDGE: NOT AT ALL
5. BEING SO RESTLESS THAT IT IS HARD TO SIT STILL: NOT AT ALL
IF YOU CHECKED OFF ANY PROBLEMS ON THIS QUESTIONNAIRE, HOW DIFFICULT HAVE THESE PROBLEMS MADE IT FOR YOU TO DO YOUR WORK, TAKE CARE OF THINGS AT HOME, OR GET ALONG WITH OTHER PEOPLE: NOT DIFFICULT AT ALL
7. FEELING AFRAID AS IF SOMETHING AWFUL MIGHT HAPPEN: NOT AT ALL
GAD7 TOTAL SCORE: 0
2. NOT BEING ABLE TO STOP OR CONTROL WORRYING: NOT AT ALL
3. WORRYING TOO MUCH ABOUT DIFFERENT THINGS: NOT AT ALL
6. BECOMING EASILY ANNOYED OR IRRITABLE: NOT AT ALL

## 2019-06-14 ASSESSMENT — PATIENT HEALTH QUESTIONNAIRE - PHQ9
5. POOR APPETITE OR OVEREATING: NOT AT ALL
SUM OF ALL RESPONSES TO PHQ QUESTIONS 1-9: 0

## 2019-06-14 NOTE — PROGRESS NOTES
"Subjective     Nhung Concepcion is a 29 year old female who presents to clinic today for the following health issues.    HPI   Depression and Anxiety Follow-Up    How are you doing with your depression since your last visit? No change    How are you doing with your anxiety since your last visit?  No change    Are you having other symptoms that might be associated with depression or anxiety? No    Have you had a significant life event? No     Do you have any concerns with your use of alcohol or other drugs? No    Social History     Tobacco Use     Smoking status: Former Smoker     Packs/day: 0.50     Types: Cigarettes     Last attempt to quit: 2018     Years since quittin.7     Smokeless tobacco: Never Used   Substance Use Topics     Alcohol use: Yes     Comment: 2 times a week     Drug use: No     PHQ 2018 1/10/2019 2019   PHQ-9 Total Score 0 4 0   Q9: Thoughts of better off dead/self-harm past 2 weeks Not at all Not at all Not at all     CHANDNI-7 SCORE 2018 1/10/2019 2019   Total Score 0 4 0     No flowsheet data found.  In the past two weeks have you had thoughts of suicide or self-harm?  No.    Do you have concerns about your personal safety or the safety of others?   No    Suicide Assessment Five-step Evaluation and Treatment (SAFE-T)    Amount of exercise or physical activity: 1 day/week for an average of 15-30 minutes    Problems taking medications regularly: Yes,  side effects to Trintellix - nausea, constipation    Medication side effects: see above    Diet: regular (no restrictions)      Painful periods, not necessarily heavy, regular put possibly more frequent than every 3 weeks.  Used to be more irregular.  Excess body hair, overweight    Normal T, prolactin, FSH, TSH    Low SHBG    ROS: const/psych/gi/gu/gyn otherwise negative     OBJECTIVE:  /84 (BP Location: Right arm, Cuff Size: Adult Large)   Pulse 72   Temp 98.5  F (36.9  C) (Tympanic)   Ht 1.651 m (5' 5\")   Wt 88.2 " kg (194 lb 8 oz)   SpO2 98%   BMI 32.37 kg/m    CONSTITUTIONAL: Alert, well-nourished, well-groomed, NAD  RESP: Lungs CTA. No wheeze, rhonchi, rales.  CV: HRRR S1 S2 No MRG. No peripheral edema  PSYCH: Bright affect. Appropriate mentation and speech.   GI: Abdomen flat. BS x 4. No TTP. No HSM or masses. No CVAT    ASSESSMENT/PLAN:  (R11.0) Nausea  (primary encounter diagnosis)      (F41.8) Depression with anxiety  Comment: Increased trintillex from 10 to 15. States she did not tolerate that so went back down to 10. States she is now having nausea every time she takes it. Unclear why she would suddenly get nausea at the same dose she has been on for months. Pregnancy unlikely as she only has sex with women. Previous ETOH use but has been sober for 9 months. No NSAID use.  Recent endoscopy showed mild reactive gastropathy. No current dyspeptic sx or sx temporally related to eating.   Plan: vortioxetine (TRINTELLIX) 5 MG tablet          -Try splitting dose, with 5mg in the am and 5mg in the PM. Take with food  -Call if nausea not resolved in 2 weeks.     (L68.9) Excess body and facial hair  Comment: Possibly PCOS. Normal testosterone but low SHBG  Plan: Referred to OB    (N92.6) Irregular menses  Comment: Concerning given increase in frequency of menses with previous PCOS sx. May need EMB  Plan: OB/GYN REFERRAL, US Pelvic Complete w         Transvaginal          -Pt referred to OB and TVUS ordered.           Kimberly Navarro, CASTILLO-DNP.

## 2019-06-15 ASSESSMENT — ANXIETY QUESTIONNAIRES: GAD7 TOTAL SCORE: 0

## 2019-06-24 ENCOUNTER — ANCILLARY PROCEDURE (OUTPATIENT)
Dept: ULTRASOUND IMAGING | Facility: CLINIC | Age: 29
End: 2019-06-24
Attending: NURSE PRACTITIONER
Payer: COMMERCIAL

## 2019-06-24 DIAGNOSIS — N92.6 IRREGULAR MENSES: ICD-10-CM

## 2019-06-24 PROCEDURE — 76830 TRANSVAGINAL US NON-OB: CPT | Performed by: OBSTETRICS & GYNECOLOGY

## 2019-06-24 PROCEDURE — 76856 US EXAM PELVIC COMPLETE: CPT | Performed by: OBSTETRICS & GYNECOLOGY

## 2019-07-31 ENCOUNTER — MYC MEDICAL ADVICE (OUTPATIENT)
Dept: PEDIATRICS | Facility: CLINIC | Age: 29
End: 2019-07-31

## 2019-07-31 DIAGNOSIS — F41.8 DEPRESSION WITH ANXIETY: ICD-10-CM

## 2019-08-01 RX ORDER — DULOXETIN HYDROCHLORIDE 60 MG/1
60 CAPSULE, DELAYED RELEASE ORAL DAILY
Qty: 90 CAPSULE | Refills: 1 | Status: SHIPPED | OUTPATIENT
Start: 2019-08-01 | End: 2020-01-28

## 2019-08-01 NOTE — TELEPHONE ENCOUNTER
Patient sent the following Coin message requesting refill of cymbalta.       Tarik Harris,    The Cymbalta seems to be doing the trick so far! I was wondering if you could put in a prescription for the [facility]? I only have a few pills left.    Thank you      Cymbalta 60 mg once per day cued for provider review/signature.     Aaliyah Jacobsen RN BSN   [facility]

## 2019-08-01 NOTE — TELEPHONE ENCOUNTER
Signed. Team, please delay this message until 5 months after the last OV. At that time please send her a PHQ

## 2019-12-09 ENCOUNTER — MYC MEDICAL ADVICE (OUTPATIENT)
Dept: PEDIATRICS | Facility: CLINIC | Age: 29
End: 2019-12-09

## 2019-12-09 DIAGNOSIS — Z71.6 ENCOUNTER FOR SMOKING CESSATION COUNSELING: Primary | ICD-10-CM

## 2019-12-09 NOTE — TELEPHONE ENCOUNTER
"Routing to PCP for .    Pt states that they plan to quite smoking and would like to be prescribed Nicotine patches 21 mg. Pt was previously prescribed Nicotine 14 mg.    Per OV (on 01/10/19):  (Z71.6) Encounter for smoking cessation counseling  Comment: No longer smoking or using the patch, just the gum.   Plan: nicotine ([facility] NICOTINE POLACRILEX) 2 MG gum  -Refilled medication  -Discussed gradual taper. As she is having mental health issues I'm hesitant to taper too fast.  -Discussed risks of long term nicotine.     Pt requested refill of patches (on 09/13/19), was provided with 4 months of 7 mg patches, and 42 14 mg patches (21 patches with 1 refill).    Kimberly Navarro: Please advise.    - Kiko \"Maico\" DEVORA Sage  Triage [facility]    "

## 2019-12-10 RX ORDER — NICOTINE 21 MG/24HR
1 PATCH, TRANSDERMAL 24 HOURS TRANSDERMAL EVERY 24 HOURS
Qty: 42 PATCH | Refills: 0 | Status: SHIPPED | OUTPATIENT
Start: 2019-12-10 | End: 2020-09-30

## 2019-12-10 RX ORDER — NICOTINE 21 MG/24HR
1 PATCH, TRANSDERMAL 24 HOURS TRANSDERMAL EVERY 24 HOURS
Qty: 21 PATCH | Refills: 1 | Status: SHIPPED | OUTPATIENT
Start: 2019-12-10 | End: 2020-09-30

## 2019-12-10 NOTE — TELEPHONE ENCOUNTER
Ok to kathi up and sign per verbal order the following plan:    Patients smoking >10 cigarettes/day: Begin with step 1 (21 mg/day) for 6 weeks, followed by step 2 (14 mg/day) for 2 weeks; finish with step 3 (7 mg/day) for 2 weeks

## 2019-12-29 ENCOUNTER — OFFICE VISIT - HEALTHEAST (OUTPATIENT)
Dept: FAMILY MEDICINE | Facility: CLINIC | Age: 29
End: 2019-12-29

## 2019-12-29 DIAGNOSIS — J06.9 VIRAL URI: ICD-10-CM

## 2019-12-29 ASSESSMENT — MIFFLIN-ST. JEOR: SCORE: 1618.97

## 2020-01-06 ENCOUNTER — MYC MEDICAL ADVICE (OUTPATIENT)
Dept: PEDIATRICS | Facility: CLINIC | Age: 30
End: 2020-01-06

## 2020-01-07 NOTE — TELEPHONE ENCOUNTER
Replied to patient's Dragon Insidehart message.     MA/TC: If patient calls, ok to schedule with a same day provider.    Thanks!  Ifeoma GAMA RN, BSN

## 2020-01-10 ENCOUNTER — MYC MEDICAL ADVICE (OUTPATIENT)
Dept: PEDIATRICS | Facility: CLINIC | Age: 30
End: 2020-01-10

## 2020-01-10 ENCOUNTER — E-VISIT (OUTPATIENT)
Dept: PEDIATRICS | Facility: CLINIC | Age: 30
End: 2020-01-10
Payer: COMMERCIAL

## 2020-01-10 DIAGNOSIS — J01.90 ACUTE SINUSITIS, RECURRENCE NOT SPECIFIED, UNSPECIFIED LOCATION: Primary | ICD-10-CM

## 2020-01-10 PROCEDURE — 99421 OL DIG E/M SVC 5-10 MIN: CPT | Performed by: NURSE PRACTITIONER

## 2020-01-10 NOTE — TELEPHONE ENCOUNTER
"Pt encouraged to start e-visit.    - Kiko \"Maico\" DEVORA Sage - Patient Advocate Liason (PAL)  [facility]    "

## 2020-01-28 ENCOUNTER — MYC MEDICAL ADVICE (OUTPATIENT)
Dept: PEDIATRICS | Facility: CLINIC | Age: 30
End: 2020-01-28

## 2020-01-28 DIAGNOSIS — F41.8 DEPRESSION WITH ANXIETY: ICD-10-CM

## 2020-01-28 RX ORDER — DULOXETIN HYDROCHLORIDE 60 MG/1
60 CAPSULE, DELAYED RELEASE ORAL DAILY
Qty: 90 CAPSULE | Refills: 0 | Status: SHIPPED | OUTPATIENT
Start: 2020-01-28 | End: 2020-04-27

## 2020-01-28 NOTE — TELEPHONE ENCOUNTER
Patient due for Follow up. Will reply to patient and give matt refill.     Medication is being filled for 1 time refill only due to:  Patient needs to be seen because due for med check, depression check.     PHQ-9 SCORE 1/10/2019 6/14/2019 11/1/2019   PHQ-9 Total Score MyChart - - 0   PHQ-9 Total Score 4 0 0

## 2020-02-11 ENCOUNTER — MYC MEDICAL ADVICE (OUTPATIENT)
Dept: PEDIATRICS | Facility: CLINIC | Age: 30
End: 2020-02-11

## 2020-02-11 DIAGNOSIS — K21.9 GASTROESOPHAGEAL REFLUX DISEASE WITHOUT ESOPHAGITIS: ICD-10-CM

## 2020-02-11 RX ORDER — OMEPRAZOLE 40 MG/1
40 CAPSULE, DELAYED RELEASE ORAL DAILY
Qty: 90 CAPSULE | Refills: 0 | Status: SHIPPED | OUTPATIENT
Start: 2020-02-11 | End: 2020-04-28

## 2020-02-11 NOTE — TELEPHONE ENCOUNTER
"TC/MA: Please call and schedule appt for preventative OV. Pt was provided with 90 refill of omeprazole.    - Kiko \"Maico\" DEVORA Sage - Patient Advocate Liason (PAL)  [facility]    "

## 2020-02-11 NOTE — TELEPHONE ENCOUNTER
"Prescription approved per [facility] Refill Protocol.    Pt provided with one-time refill. Pt encouraged to schedule OV.    Requested Prescriptions   Pending Prescriptions Disp Refills     omeprazole (PRILOSEC) 40 MG DR capsule 90 capsule 0     Sig: Take 1 capsule (40 mg) by mouth daily Take 30-60 minutes before a meal.   Last Written Prescription Date:  01/10/19  Last Fill Quantity: 90 caps,  # refills: 3   Last office visit: 6/14/2019 with prescribing provider:  Kimberly Navarro   Future Office Visit:        There is no refill protocol information for this order        - Kiko \"Maico\" DEVORA Sage - Patient Advocate Liason (PAL)  [facility]    "

## 2020-03-10 ENCOUNTER — HEALTH MAINTENANCE LETTER (OUTPATIENT)
Age: 30
End: 2020-03-10

## 2020-04-14 ENCOUNTER — MYC MEDICAL ADVICE (OUTPATIENT)
Dept: PEDIATRICS | Facility: CLINIC | Age: 30
End: 2020-04-14

## 2020-04-14 DIAGNOSIS — F17.219 CIGARETTE NICOTINE DEPENDENCE WITH NICOTINE-INDUCED DISORDER: ICD-10-CM

## 2020-04-14 NOTE — TELEPHONE ENCOUNTER
Signed.  But has been on nicotine patches for ages.  I'd like to do a phone visit to discuss smoking cessation f/u. If she is willing, please schedule

## 2020-04-14 NOTE — TELEPHONE ENCOUNTER
"Routing nicotine polacrilex (NICORETTE) 4 MG gum refill to Kimberly Navarro.    Requested Prescriptions   Pending Prescriptions Disp Refills     nicotine polacrilex (NICORETTE) 4 MG gum 240 each 3     Sig: Place 1 each (4 mg) inside cheek as needed for smoking cessation   Last Written Prescription Date:  01/28/20  Last Fill Quantity: 240 each,  # refills: 3   Last office visit: 6/14/2019 with prescribing provider:  Kimberly Navarro   Future Office Visit:        There is no refill protocol information for this order          - Kiko \"Maico\" DEVORA Sage - Patient Advocate Liason (PAL)  [facility]    "

## 2020-04-27 ENCOUNTER — MYC REFILL (OUTPATIENT)
Dept: PEDIATRICS | Facility: CLINIC | Age: 30
End: 2020-04-27

## 2020-04-27 DIAGNOSIS — K21.9 GASTROESOPHAGEAL REFLUX DISEASE WITHOUT ESOPHAGITIS: ICD-10-CM

## 2020-04-27 DIAGNOSIS — F41.8 DEPRESSION WITH ANXIETY: ICD-10-CM

## 2020-04-28 RX ORDER — OMEPRAZOLE 40 MG/1
40 CAPSULE, DELAYED RELEASE ORAL DAILY
Qty: 90 CAPSULE | Refills: 0 | Status: SHIPPED | OUTPATIENT
Start: 2020-04-28 | End: 2020-05-08

## 2020-04-28 RX ORDER — OMEPRAZOLE 40 MG/1
40 CAPSULE, DELAYED RELEASE ORAL DAILY
Qty: 90 CAPSULE | Refills: 0 | OUTPATIENT
Start: 2020-04-28

## 2020-04-28 RX ORDER — DULOXETIN HYDROCHLORIDE 60 MG/1
60 CAPSULE, DELAYED RELEASE ORAL DAILY
Qty: 30 CAPSULE | Refills: 0 | Status: SHIPPED | OUTPATIENT
Start: 2020-04-28 | End: 2020-05-08

## 2020-04-28 NOTE — TELEPHONE ENCOUNTER
Patient has upcoming appointment with PCP on 5/14, will send 30 day supply.     Omeprazole sent in separate encounter.

## 2020-05-07 NOTE — PROGRESS NOTES
"Nhung Concepcion is a 30 year old female who is being evaluated via a billable video visit.      General consent for services was read to patient. Did patient consent? yes  Subjective     Nhung Concepcion is a 30 year old female who presents to clinic today for the following health issues:    HPI    Medication Followup of Duloxetine    Taking Medication as prescribed: yes    Side Effects:  None    Medication Helping Symptoms:  Kind of   Medication Followup of Omeprazole    Taking Medication as prescribed: yes    Side Effects:  None    Medication Helping Symptoms:  yes    Mental health:  Not currently taking the Trintellex  Taking Nicorette gum 4mg for smoking cessation currently.  For the last month, has had an increased \"caffeine addiction\"  Switched from Trintillex to Duloxetine last June because it was causing nausea.   Feels her depression was well controlled at some points during the last year, but has been worse over the past few months.  Has not had ETOH in 19 months.  Doing trauma work with her therapist   Feels like her obsessive thoughts have worsened.     Has been on:  Effexor  Prozac  Buspar  Wellbutrin  Trintillex    Thyroid:  Recently started on thyroid meds for low thyroid. TSH and T4 were normal. T3 was low.    PCOS/Hair growth:  Was diagnosed at the women's clinic with PCOS. Was started on the pill but stopped it because it increased her appetite.         How many servings of fruits and vegetables do you eat daily?  0-1    On average, how many sweetened beverages do you drink each day (Examples: soda, juice, sweet tea, etc.  Do NOT count diet or artificially sweetened beverages)?   4-6    How many days per week do you exercise enough to make your heart beat faster? 3 or less    How many minutes a day do you exercise enough to make your heart beat faster? 30 - 60    How many days per week do you miss taking your medication? 0      -------------------------------------    Patient Active Problem List "   Diagnosis     Depression with anxiety     Gastroesophageal reflux disease without esophagitis     Suicide ideation     Morbid obesity (H)     Alcoholism (H)     Positive TODD (antinuclear antibody)     Impaired fasting glucose     Past Surgical History:   Procedure Laterality Date     ESOPHAGOSCOPY, GASTROSCOPY, DUODENOSCOPY (EGD), COMBINED  2019    Dr. Krueger [facility]     ESOPHAGOSCOPY, GASTROSCOPY, DUODENOSCOPY (EGD), COMBINED N/A 3/25/2019    Procedure: ESOPHAGOSCOPY, GASTROSCOPY, DUODENOSCOPY;  Surgeon: Sam Krueger MD;  Location:  GI     ORTHOPEDIC SURGERY      right arm surgery       Social History     Tobacco Use     Smoking status: Former Smoker     Packs/day: 0.50     Types: Cigarettes     Last attempt to quit: 2020     Years since quittin.3     Smokeless tobacco: Never Used     Tobacco comment: Currently using 4 mg Gum   Substance Use Topics     Alcohol use: Not Currently     Family History   Problem Relation Age of Onset     Breast Cancer Maternal Grandmother      Colon Cancer No family hx of            Reviewed and updated as needed this visit by Provider         Review of Systems   ROS COMP: Constitutional, HEENT, cardiovascular, pulmonary, gi and gu systems are negative, except as otherwise noted.        Diagnostic Test Results:  Labs reviewed in Epic        Assessment & Plan     1. Depression with anxiety  Uncontrolled depression. Previously saw psychiatry when being treated for alcoholism. Has been sober without relapse for 18 months but stopped seeing psych and depression is poorly controlled. Having low energy and having suicidal thoughts. States she has brainstormed ways to kill herself but doesn't have an exact plan or time. States she doesn't plan to follow through with it. Sometimes she doesn't wear her seatbelt on purpose due to suicidality but denies reckless driving, etc. States she has told her wife and therapist about these thoughts. Feels confident she will  "not act on them. She doesn't know what is contributing to increased depression over the last few months, possibly COVID.  -Increase Cymbalta from 60 to 90.   -Set up appt asap with collaborative care psych. Could consider treatment resistant depression clinic in the future  -Lab visit for TSH, B12 vitamin D  -Contracted for safety. Crisis resources discussed including 911 and [facility] ER. She feels confident she will not harm herself. She agrees to wear her seatbelt.   -F/U 1 month, sooner for worsening suicidal thinking.   - DULoxetine (CYMBALTA) 60 MG capsule; Take 1 capsule (60 mg) by mouth daily  Dispense: 30 capsule; Refill: 0  - MENTAL HEALTH REFERRAL  - Adult; Psychiatry; Psychiatry; G: Collaborative [facility] Psychiatry Service/Bridge to Long-Term Psychiatry as indicated ([phone]); No - Patient must be evaluated prior to placing referral OR document reason for refer...  - DULoxetine (CYMBALTA) 30 MG capsule; Take 1 capsule (30 mg) by mouth daily  Dispense: 90 capsule; Refill: 0  - Vitamin D Deficiency; Future  - Vitamin B12; Future    2. Gastroesophageal reflux disease without esophagitis  Ongoing. Needs refills. Not addressed today.   - omeprazole (PRILOSEC) 40 MG DR capsule; Take 1 capsule (40 mg) by mouth daily Take 30-60 minutes before a meal.  Dispense: 90 capsule; Refill: 0    3. Bilateral ovarian cysts  Ongoing.  Sometimes gets \"cyst pain.\" Was told by a women's clinic that she has PCOS. Didn't tolerate birth control pills due to increased appetite  -Did not have time to discuss today.     4. PCOS (polycystic ovarian syndrome)  Has ovarian cysts per patient-unknown if she has had this confirmed on US. Has excessive weight and hirsutism. Stopped metformin because she didn't know if it was helping. Did not have time to address today.   -Consider spironolactone for hirsutism.    5. Thyroid function test abnormal  States TSH and T4 were normal, but T3 was low. Started on cytomel. We " "discussed that T3 can fluctuate during the day, especially depending on if she had eaten. She is to stop the thyroid meds and recheck labs in a month  - TSH with free T4 reflex; Future  - T3, Free; Future    6. Impaired fasting glucose  Has gained weight. No longer on metformin for unknown reasons. Needs recheck   - Hemoglobin A1c; Future     BMI:   Estimated body mass index is 34.95 kg/m  as calculated from the following:    Height as of 6/14/19: 1.651 m (5' 5\").    Weight as of this encounter: 95.3 kg (210 lb).   Weight management plan: Discussed healthy diet and exercise guidelines      F/U 4 weeks, sooner for worsening sx    No follow-ups on file.    YEN Guo CNP  [facility]      Switched to phone visit 28 minutes    No follow-ups on file.       YEN Guo CNP      "

## 2020-05-08 ENCOUNTER — VIRTUAL VISIT (OUTPATIENT)
Dept: PEDIATRICS | Facility: CLINIC | Age: 30
End: 2020-05-08
Payer: COMMERCIAL

## 2020-05-08 VITALS — BODY MASS INDEX: 34.95 KG/M2 | WEIGHT: 210 LBS

## 2020-05-08 DIAGNOSIS — R94.6 THYROID FUNCTION TEST ABNORMAL: ICD-10-CM

## 2020-05-08 DIAGNOSIS — N83.202 BILATERAL OVARIAN CYSTS: Primary | ICD-10-CM

## 2020-05-08 DIAGNOSIS — N83.201 BILATERAL OVARIAN CYSTS: Primary | ICD-10-CM

## 2020-05-08 DIAGNOSIS — F41.8 DEPRESSION WITH ANXIETY: ICD-10-CM

## 2020-05-08 DIAGNOSIS — R73.01 IMPAIRED FASTING GLUCOSE: ICD-10-CM

## 2020-05-08 DIAGNOSIS — K21.9 GASTROESOPHAGEAL REFLUX DISEASE WITHOUT ESOPHAGITIS: ICD-10-CM

## 2020-05-08 DIAGNOSIS — E28.2 PCOS (POLYCYSTIC OVARIAN SYNDROME): ICD-10-CM

## 2020-05-08 PROCEDURE — 99214 OFFICE O/P EST MOD 30 MIN: CPT | Mod: TEL | Performed by: NURSE PRACTITIONER

## 2020-05-08 RX ORDER — DULOXETIN HYDROCHLORIDE 30 MG/1
30 CAPSULE, DELAYED RELEASE ORAL DAILY
Qty: 90 CAPSULE | Refills: 0 | Status: SHIPPED | OUTPATIENT
Start: 2020-05-08 | End: 2020-06-22

## 2020-05-08 RX ORDER — OMEPRAZOLE 40 MG/1
40 CAPSULE, DELAYED RELEASE ORAL DAILY
Qty: 90 CAPSULE | Refills: 0 | Status: SHIPPED | OUTPATIENT
Start: 2020-05-08 | End: 2020-09-22

## 2020-05-08 RX ORDER — DULOXETIN HYDROCHLORIDE 60 MG/1
60 CAPSULE, DELAYED RELEASE ORAL DAILY
Qty: 30 CAPSULE | Refills: 0 | Status: SHIPPED | OUTPATIENT
Start: 2020-05-08 | End: 2020-06-22

## 2020-05-08 ASSESSMENT — ANXIETY QUESTIONNAIRES
3. WORRYING TOO MUCH ABOUT DIFFERENT THINGS: SEVERAL DAYS
4. TROUBLE RELAXING: SEVERAL DAYS
5. BEING SO RESTLESS THAT IT IS HARD TO SIT STILL: SEVERAL DAYS
7. FEELING AFRAID AS IF SOMETHING AWFUL MIGHT HAPPEN: SEVERAL DAYS
GAD7 TOTAL SCORE: 7
7. FEELING AFRAID AS IF SOMETHING AWFUL MIGHT HAPPEN: SEVERAL DAYS
1. FEELING NERVOUS, ANXIOUS, OR ON EDGE: SEVERAL DAYS
2. NOT BEING ABLE TO STOP OR CONTROL WORRYING: SEVERAL DAYS
GAD7 TOTAL SCORE: 7
6. BECOMING EASILY ANNOYED OR IRRITABLE: SEVERAL DAYS
GAD7 TOTAL SCORE: 7

## 2020-05-08 ASSESSMENT — PATIENT HEALTH QUESTIONNAIRE - PHQ9
10. IF YOU CHECKED OFF ANY PROBLEMS, HOW DIFFICULT HAVE THESE PROBLEMS MADE IT FOR YOU TO DO YOUR WORK, TAKE CARE OF THINGS AT HOME, OR GET ALONG WITH OTHER PEOPLE: SOMEWHAT DIFFICULT
SUM OF ALL RESPONSES TO PHQ QUESTIONS 1-9: 8
SUM OF ALL RESPONSES TO PHQ QUESTIONS 1-9: 8

## 2020-05-09 ASSESSMENT — PATIENT HEALTH QUESTIONNAIRE - PHQ9: SUM OF ALL RESPONSES TO PHQ QUESTIONS 1-9: 8

## 2020-05-09 ASSESSMENT — ANXIETY QUESTIONNAIRES: GAD7 TOTAL SCORE: 7

## 2020-05-13 ENCOUNTER — TELEPHONE (OUTPATIENT)
Dept: PEDIATRICS | Facility: CLINIC | Age: 30
End: 2020-05-13

## 2020-05-13 NOTE — TELEPHONE ENCOUNTER
Called Joy, therapist for [facility]. No answer, LVM to call back on my personal extension.    If she calls back:  Find more details regarding specific needs pt needs to Care Coordination.    Francesco Jason, EMT at 2:09 PM on May 13, 2020   [facility] Health Guide   [phone]

## 2020-05-13 NOTE — TELEPHONE ENCOUNTER
Called pt on both home and cell phone. Emergency contact (spouse - listed as same phone number as home number). No answer, LVM to call back on my personal extension by end of day.    Francesco Jason, EMT at 2:46 PM on May 13, 2020   [facility] Health Guide   [phone]

## 2020-05-13 NOTE — TELEPHONE ENCOUNTER
Joy, therapist, called back. Pt is seeing her therapist weekly.    Notes pt increased SI due stress in this last weekend. Wondering if due to medication change.    Pt is unclear about psychiatrist: wondering if psychiatrist is meant to take over mental health medications or if PCP will manage mental health medications. Pt has not scheduled with psychiatry yet.    Francesco Jason, EMT at 2:20 PM on May 13, 2020   [facility] Health Guide   [phone]

## 2020-05-13 NOTE — TELEPHONE ENCOUNTER
Patient Returning Call  Reason for call:  To discuss Care Coordination, medication list, etc  Information relayed to patient:  Will get call back to discuss options for care  Patient has additional questions:  Yes  What are your questions/concerns:  Joy, a therapist for [facility] would like to discuss options for Care Coordination for the pt. They state the pt has had suicidal idealization, and would like to go over their medication list as well. Please contact to discuss and advise.  Joy: [phone] ext [phone]  Okay to leave a detailed message?: No at Home number on file [phone] (home)

## 2020-05-13 NOTE — TELEPHONE ENCOUNTER
Cymbalta recently increased from 60 to 90. I don't really feel comfortable making more changes.    SB4 pal. Please assess for suicidality. If active or rapidly worsening, needs to go to [facility] ER. If you are not 100% confident assessing this, please have RN call the patient.    Please ensure she calls psych to schedule today, and then please let me know the date. She should let the  know that this is urgent as she is having SI.    I will be away from my computer for the rest of the day, to please grab a provider who is working today for any urgent needs.

## 2020-05-13 NOTE — TELEPHONE ENCOUNTER
Pt called back. 6/9/2020 for a video visit is the soonest the pt could be scheduled.     Pt still feeling safe and understands to call 911 or go to [facility] if she feels unsafe.    Francesco Jason, EMT at 4:35 PM on May 13, 2020   [facility] Health Guide   [phone]

## 2020-05-13 NOTE — TELEPHONE ENCOUNTER
Pt called back.    Pt notes she hasn't noticed any changes with the medications yet. She notes having a rough few days. Pt notes having passive SI. Denies any plans. Pt feels safe. Reviewed that if worsening she needs to call 911 or go to [facility] ER. Pt verbalized understanding.    Gave pt psychiatrist appt number. Instructed pt to make appt as soon as possible given SI and to call back with when she has scheduled. If I am not available, instructed pt to leave VM with the scheduled date. Pt agreeable to plan and verbalized understanding.    Encouraged pt to reach out with further questions or concerns.    Francesco Jason, EMT at 4:08 PM on May 13, 2020   [facility] Health Guide   [phone]

## 2020-06-05 DIAGNOSIS — F41.8 DEPRESSION WITH ANXIETY: ICD-10-CM

## 2020-06-05 DIAGNOSIS — R73.01 IMPAIRED FASTING GLUCOSE: ICD-10-CM

## 2020-06-05 DIAGNOSIS — R94.6 THYROID FUNCTION TEST ABNORMAL: ICD-10-CM

## 2020-06-05 LAB — HBA1C MFR BLD: 5.6 % (ref 0–5.6)

## 2020-06-05 PROCEDURE — 83036 HEMOGLOBIN GLYCOSYLATED A1C: CPT | Performed by: NURSE PRACTITIONER

## 2020-06-05 PROCEDURE — 36415 COLL VENOUS BLD VENIPUNCTURE: CPT | Performed by: NURSE PRACTITIONER

## 2020-06-05 PROCEDURE — 84481 FREE ASSAY (FT-3): CPT | Performed by: NURSE PRACTITIONER

## 2020-06-05 PROCEDURE — 82607 VITAMIN B-12: CPT | Performed by: NURSE PRACTITIONER

## 2020-06-05 PROCEDURE — 82306 VITAMIN D 25 HYDROXY: CPT | Performed by: NURSE PRACTITIONER

## 2020-06-05 PROCEDURE — 84443 ASSAY THYROID STIM HORMONE: CPT | Performed by: NURSE PRACTITIONER

## 2020-06-06 LAB
T3FREE SERPL-MCNC: 2.5 PG/ML (ref 2.3–4.2)
TSH SERPL DL<=0.005 MIU/L-ACNC: 2.02 MU/L (ref 0.4–4)
VIT B12 SERPL-MCNC: 719 PG/ML (ref 193–986)

## 2020-06-08 LAB — DEPRECATED CALCIDIOL+CALCIFEROL SERPL-MC: 33 UG/L (ref 20–75)

## 2020-06-19 ENCOUNTER — MYC MEDICAL ADVICE (OUTPATIENT)
Dept: PEDIATRICS | Facility: CLINIC | Age: 30
End: 2020-06-19

## 2020-06-19 NOTE — TELEPHONE ENCOUNTER
"Sent Naked Winest message.      - Kiko \"Maico\" DEVORA Sage - Patient Advocate Liason (PAL)  [facility]    "

## 2020-06-29 ENCOUNTER — OFFICE VISIT - HEALTHEAST (OUTPATIENT)
Dept: FAMILY MEDICINE | Facility: CLINIC | Age: 30
End: 2020-06-29

## 2020-06-29 DIAGNOSIS — Z20.822 SUSPECTED 2019 NOVEL CORONAVIRUS INFECTION: ICD-10-CM

## 2020-07-08 ENCOUNTER — VIRTUAL VISIT (OUTPATIENT)
Dept: PSYCHIATRY | Facility: CLINIC | Age: 30
End: 2020-07-08
Payer: COMMERCIAL

## 2020-07-08 DIAGNOSIS — F33.1 MAJOR DEPRESSIVE DISORDER, RECURRENT EPISODE, MODERATE (H): ICD-10-CM

## 2020-07-08 DIAGNOSIS — F43.10 POSTTRAUMATIC STRESS DISORDER: ICD-10-CM

## 2020-07-08 DIAGNOSIS — F10.21 ALCOHOL USE DISORDER, MODERATE, IN EARLY REMISSION (H): ICD-10-CM

## 2020-07-08 DIAGNOSIS — F39 MOOD DISORDER (H): Primary | ICD-10-CM

## 2020-07-08 PROCEDURE — 90792 PSYCH DIAG EVAL W/MED SRVCS: CPT | Mod: GT | Performed by: NURSE PRACTITIONER

## 2020-07-08 RX ORDER — MIRTAZAPINE 15 MG/1
7.5-15 TABLET, FILM COATED ORAL AT BEDTIME
Qty: 30 TABLET | Refills: 0 | Status: SHIPPED | OUTPATIENT
Start: 2020-07-08 | End: 2020-08-13

## 2020-07-08 RX ORDER — LAMOTRIGINE 25 MG/1
TABLET ORAL
Qty: 42 TABLET | Refills: 0 | Status: SHIPPED | OUTPATIENT
Start: 2020-07-08 | End: 2020-08-13 | Stop reason: DRUGHIGH

## 2020-07-08 NOTE — PATIENT INSTRUCTIONS
1.  Continue Duloxetine 90 mg daily  2. Start Lamictal 25 mg daily for two weeks then 50 mg daily  3.  Add Mirtazapine 7.5 - 15 mg at bedtime  4.  Continue trauma therapy  5. Gene Sight Test Kit to be mailed       Continue all other medical directions per primary care provider.     Continue all other medications as reviewed per electronic medical record today.     Safety plan reviewed. To the Emergency Department as needed or call after hours crisis line at [phone] or [phone]. [place] Crisis Text Line: Text [place] to 237516  or  Suicide LifeLine Chat: suicideVeebow.org/chat/    To schedule individual or family therapy, call [facility] at [phone].     Schedule an appointment with me in 4 weeks or sooner as needed.  Call [facility] at [phone] to schedule.    Follow up with primary care provider as planned or for acute medical concerns.    Call the psychiatric nurse line with medication questions or concerns at [phone].    Jamalon may be used to communicate with your provider, but this is not intended to be used for emergencies.    Crisis Resources:    National Suicide Prevention Lifeline: 607.820.5482 (TTY: 639.868.7985). Call anytime for help.  (www.suicidepreventionlifeline.org)  National [place] [employer] on Mental Illness (www.monalisa.org): 949.775.9743 or 283-976-9477.   Mental Health Association (www.mentalhealth.org): 578.830.1479 or 087-523-8191.  [place] Crisis Text Line: Text [place] to 867892  Suicide LifeLine Chat: suicideVeebow.org/chat
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
## 2020-07-17 ENCOUNTER — TRANSFERRED RECORDS (OUTPATIENT)
Dept: HEALTH INFORMATION MANAGEMENT | Facility: CLINIC | Age: 30
End: 2020-07-17

## 2020-08-04 ENCOUNTER — MYC MEDICAL ADVICE (OUTPATIENT)
Dept: PSYCHIATRY | Facility: CLINIC | Age: 30
End: 2020-08-04

## 2020-08-04 NOTE — TELEPHONE ENCOUNTER
Message sent to patient clarifying her medication doses of Lamictal and duloxetine. Also encouraged her to schedule a follow up appointment.

## 2020-08-06 ENCOUNTER — TELEPHONE (OUTPATIENT)
Dept: PSYCHIATRY | Facility: CLINIC | Age: 30
End: 2020-08-06

## 2020-08-06 DIAGNOSIS — F41.8 DEPRESSION WITH ANXIETY: ICD-10-CM

## 2020-08-06 RX ORDER — DULOXETIN HYDROCHLORIDE 30 MG/1
30 CAPSULE, DELAYED RELEASE ORAL DAILY
Qty: 90 CAPSULE | Refills: 0 | Status: SHIPPED | OUTPATIENT
Start: 2020-08-06 | End: 2020-08-13

## 2020-08-06 RX ORDER — DULOXETIN HYDROCHLORIDE 60 MG/1
60 CAPSULE, DELAYED RELEASE ORAL DAILY
Qty: 30 CAPSULE | Refills: 0 | Status: SHIPPED | OUTPATIENT
Start: 2020-08-06 | End: 2020-08-13

## 2020-08-06 NOTE — TELEPHONE ENCOUNTER
Spoke with pharmacy.   Clarified Rx QTY.    Patricia Coppola, RN    Nurse Liaison  [facility]

## 2020-08-06 NOTE — TELEPHONE ENCOUNTER
Reason for Call:  Medication or medication refill:    Do you use a [facility]?  Name of the pharmacy and phone number for the current request: [facility] discharge    Name of the medication requested: Orders received for 30mg and 60mg stating to take one daily. Quantity on one is 30 and the other is 90. New orders stated to take both but the orders didn't fit. 30 days for one and 90 days for the other. Pharmacy looking for clarification on dosage/refill    Other request: no    Can we leave a detailed message on this number? YES    Phone number patient can be reached at:  [phone] of the  Line at [phone]    Best Time: any    Call taken on 8/6/2020 at 10:38 AM by Clark Barton

## 2020-08-06 NOTE — TELEPHONE ENCOUNTER
Reason for call:  Medication   If this is a refill request, has the caller requested the refill from the pharmacy already? Yes  Will the patient be using a [facility]? Yes  Name of the pharmacy and phone number for the current request:     Name of the medication requested: Duloxetine    Other request: Received call from [facility] wanting to clarify orders for duloxetine.  Please call pharmacy staff at [phone].    Phone number to reach patient:  Other phone number:  [phone]    Best Time:  Anytime    Can we leave a detailed message on this number?  Not Applicable    Travel screening: Not Applicable

## 2020-08-06 NOTE — TELEPHONE ENCOUNTER
New Rxs sent to pharmacy with updated sig to include that each strength should be taken with other strength to total 90mg daily.     Patricia Coppola, RN    Nurse Liaison  [facility]

## 2020-08-13 ENCOUNTER — VIRTUAL VISIT (OUTPATIENT)
Dept: PSYCHIATRY | Facility: CLINIC | Age: 30
End: 2020-08-13
Payer: COMMERCIAL

## 2020-08-13 ENCOUNTER — MYC MEDICAL ADVICE (OUTPATIENT)
Dept: PSYCHIATRY | Facility: CLINIC | Age: 30
End: 2020-08-13

## 2020-08-13 DIAGNOSIS — F51.05 INSOMNIA DUE TO OTHER MENTAL DISORDER: ICD-10-CM

## 2020-08-13 DIAGNOSIS — F43.10 POSTTRAUMATIC STRESS DISORDER: ICD-10-CM

## 2020-08-13 DIAGNOSIS — F33.1 MAJOR DEPRESSIVE DISORDER, RECURRENT EPISODE, MODERATE (H): ICD-10-CM

## 2020-08-13 DIAGNOSIS — F39 MOOD DISORDER (H): ICD-10-CM

## 2020-08-13 DIAGNOSIS — F41.8 DEPRESSION WITH ANXIETY: ICD-10-CM

## 2020-08-13 DIAGNOSIS — F99 INSOMNIA DUE TO OTHER MENTAL DISORDER: ICD-10-CM

## 2020-08-13 DIAGNOSIS — F10.21 ALCOHOL USE DISORDER, MODERATE, IN EARLY REMISSION (H): Primary | ICD-10-CM

## 2020-08-13 PROCEDURE — 99214 OFFICE O/P EST MOD 30 MIN: CPT | Mod: 95 | Performed by: NURSE PRACTITIONER

## 2020-08-13 RX ORDER — DULOXETIN HYDROCHLORIDE 60 MG/1
60 CAPSULE, DELAYED RELEASE ORAL DAILY
Qty: 30 CAPSULE | Refills: 0 | Status: SHIPPED | OUTPATIENT
Start: 2020-08-13 | End: 2020-09-21

## 2020-08-13 RX ORDER — LAMOTRIGINE 100 MG/1
100 TABLET ORAL DAILY
Qty: 30 TABLET | Refills: 1 | Status: SHIPPED | OUTPATIENT
Start: 2020-08-13 | End: 2020-10-14

## 2020-08-13 RX ORDER — ESZOPICLONE 1 MG/1
1 TABLET, FILM COATED ORAL
Qty: 7 TABLET | Refills: 0 | Status: SHIPPED | OUTPATIENT
Start: 2020-08-13 | End: 2023-02-22

## 2020-08-13 RX ORDER — DULOXETIN HYDROCHLORIDE 30 MG/1
30 CAPSULE, DELAYED RELEASE ORAL DAILY
Qty: 30 CAPSULE | Refills: 0 | Status: SHIPPED | OUTPATIENT
Start: 2020-08-13 | End: 2020-09-21

## 2020-08-13 ASSESSMENT — ANXIETY QUESTIONNAIRES
7. FEELING AFRAID AS IF SOMETHING AWFUL MIGHT HAPPEN: NOT AT ALL
5. BEING SO RESTLESS THAT IT IS HARD TO SIT STILL: NOT AT ALL
3. WORRYING TOO MUCH ABOUT DIFFERENT THINGS: NOT AT ALL
6. BECOMING EASILY ANNOYED OR IRRITABLE: NOT AT ALL
1. FEELING NERVOUS, ANXIOUS, OR ON EDGE: NOT AT ALL
IF YOU CHECKED OFF ANY PROBLEMS ON THIS QUESTIONNAIRE, HOW DIFFICULT HAVE THESE PROBLEMS MADE IT FOR YOU TO DO YOUR WORK, TAKE CARE OF THINGS AT HOME, OR GET ALONG WITH OTHER PEOPLE: NOT DIFFICULT AT ALL
2. NOT BEING ABLE TO STOP OR CONTROL WORRYING: NOT AT ALL
GAD7 TOTAL SCORE: 0

## 2020-08-13 ASSESSMENT — PATIENT HEALTH QUESTIONNAIRE - PHQ9
SUM OF ALL RESPONSES TO PHQ QUESTIONS 1-9: 1
5. POOR APPETITE OR OVEREATING: NOT AT ALL

## 2020-08-13 NOTE — TELEPHONE ENCOUNTER
Routing to provider for review.    Ptaricia Coppola, RN    Nurse Liaison  [facility]

## 2020-08-13 NOTE — PROGRESS NOTES
"Nhung Concepcion is a 30 year old female who is being evaluated via a billable telephone visit.      The patient has been notified of following:     \"This telephone visit will be conducted via a call between you and your physician/provider. We have found that certain health care needs can be provided without the need for a physical exam.  This service lets us provide the care you need with a short phone conversation.  If a prescription is necessary we can send it directly to your pharmacy.  If lab work is needed we can place an order for that and you can then stop by our lab to have the test done at a later time.    Telephone visits are billed at different rates depending on your insurance coverage. During this emergency period, for some insurers they may be billed the same as an in-person visit.  Please reach out to your insurance provider with any questions.    If during the course of the call the physician/provider feels a telephone visit is not appropriate, you will not be charged for this service.\"    Patient has given verbal consent for Telephone visit?  Yes    What phone number would you like to be contacted at?  [phone]    How would you like to obtain your AVS? MyChart    Phone call duration: 30 minutes    Laurie Conroy NP        Outpatient Psychiatric Progress Note    Name: Nhung Concepcion   : 1990                    Primary Care Provider: YEN Guo CNP   Therapist: Trauma therapy    PHQ-9 scores:  PHQ-9 SCORE 2020   PHQ-9 Total Score MyChart 8 (Mild depression) 10 (Moderate depression) -   PHQ-9 Total Score 8 10 1       CHANDNI-7 scores:  CHANDNI-7 SCORE 2020   Total Score 7 (mild anxiety) 10 (moderate anxiety) -   Total Score 7 10 0       Patient Identification:    Patient is a 30 year old year old, partnered / significant other  White American female  who presents for return visit with me.  Patient is currently employed full " "time. Patient attended the session alone. Patient prefers to be called: \" Leticia\".    Interim History:    I last saw Nhung Concepcion for outpatient psychiatry Consultation on July 8, 2020.     During that appointment, she wanted to look at medication options to better manage her mood dysregulation , including intrusive and obsessive thoughts that are triggered by past childhood trauma events.  Sometimes this can be so overwhelming to her that she has thoughts of not wanting to live.  She would like to be able to function in her family unit without reliving her past trauma experiences.  Today she agreed to a titration of lamotrigine for her mood dysregulation.  She is sleeping poorly and also is experiencing bouts of deep depression.  Mirtazapine has been added at bedtime.  She will continue trauma therapy.  Nhung has finished DBT and is encouraged to practice her skills in times of stress.  Finally,  Nhung is interested in pursuing [employer] testing kit was mailed to her for consideration.  ** .     Current medications include: DULoxetine (CYMBALTA) 30 MG capsule, Take 1 capsule (30 mg) by mouth daily take with 60mg to total 90 mg daily.  DULoxetine (CYMBALTA) 60 MG capsule, Take 1 capsule (60 mg) by mouth daily take with 30mg to total 90 mg daily.  lamoTRIgine (LAMICTAL) 25 MG tablet, Take 25 mg daily for two weeks then 50 mg daily  mirtazapine (REMERON) 15 MG tablet, Take 0.5-1 tablets (7.5-15 mg) by mouth At Bedtime  nicotine polacrilex (NICORETTE) 4 MG gum, Place 1 each (4 mg) inside cheek as needed for smoking cessation  omeprazole (PRILOSEC) 40 MG DR capsule, Take 1 capsule (40 mg) by mouth daily Take 30-60 minutes before a meal.  ondansetron (ZOFRAN-ODT) 4 MG ODT tab, Take 1 tablet (4 mg) by mouth every 8 hours as needed for nausea  nicotine ([employer] NICOTINE POLACRILEX) 2 MG gum, Place 1 each (2 mg) inside cheek as needed for smoking cessation (Patient not taking: Reported on 8/13/2020)  nicotine ([employer] " NICOTINE) 7 MG/24HR 24 hr patch, Place 1 patch onto the skin every 24 hours (Patient not taking: Reported on 8/13/2020)  nicotine (NICODERM CQ) 14 MG/24HR 24 hr patch, Place 1 patch onto the skin every 24 hours (Patient not taking: Reported on 5/8/2020)  nicotine (NICODERM CQ) 14 MG/24HR 24 hr patch, Place 1 patch onto the skin every 24 hours (Patient not taking: Reported on 5/8/2020)  nicotine (NICODERM CQ) 21 MG/24HR 24 hr patch, Place 1 patch onto the skin every 24 hours (Patient not taking: Reported on 5/8/2020)    benzocaine 20% (HURRICAINE/TOPEX) 20 % spray         The [place] Prescription Monitoring Program has been reviewed and there are no concerns about diversionary activity for controlled substances at this time.      I was able to review most recent Primary Care Provider, specialty provider, and therapy visit notes that I have access to.     Today, patient reports that she has noticed a slight improvement in relief from depression and anxiety symptoms.  Her life is very busy with 2 young children at home and working in the hospital setting.  When she tried the mirtazapine she reported feeling too sleepy on it and stopped taking it.  She reports no skin rashes from taking the Lamictal and has tolerated it well.  Today was her last dose of 50 mg daily.  She continues to process trauma with her therapist.  Overall her mood is more regulated.  [employer] test results were reviewed today and a copy was mailed to her home.     has a past medical history of Anxiety and Depressive disorder.    Social history updates:    She continues to work in medical laboratory.  She and her partner manage to small children at home.    Substance use updates:    She denies alcohol use  Tobacco use: Yes    Vital Signs:   There were no vitals taken for this visit.    Labs:    Most recent laboratory results reviewed and no new labs.     Review of Systems:  10 systems (general, cardiovascular, respiratory, eyes, ENT, endocrine,  GI, , M/S, neurological) were reviewed. Most pertinent finding(s) is/are: She denies chest pain, no rashes, no shortness of breath. The remaining systems are all unremarkable.    Mental Status Examination:  Appearance:  awake, alert  Attitude:  cooperative   Eye Contact:  Unable to assess  Gait and Station: No assistive Devices used and No dizziness or falls  Psychomotor Behavior:  Unable to assess  Oriented to:  time, person, and place  Attention Span and Concentration:  Normal  Speech:   clear, coherent and Speaks: English  Mood:  anxious and depressed  Affect:  appropriate and in normal range  Associations:  no loose associations  Thought Process:  logical and goal oriented  Thought Content:  no evidence of suicidal ideation or homicidal ideation, no auditory hallucinations present and no visual hallucinations present  Recent and Remote Memory:  intact Not formally assessed. No amnesia.  Fund of Knowledge: appropriate  Insight:  good  Judgment:  intact  Impulse Control:  intact    Suicide Risk Assessment:  Today Nhung Concepcion reports that she is having no thoughts to want to end her life or to harm other people. In addition, there are notable risk factors for self-harm, including anxiety, substance abuse and mood change. However, risk is mitigated by commitment to family, history of seeking help when needed, future oriented, no access to firearms or weapons, denies suicidal intent or plan and denies homicidal ideation, intent, or plan. Therefore, based on all available evidence including the factors cited above, Nhung Concepcion does not appear to be at imminent risk for self-harm, does not meet criteria for a 72-hr hold, and therefore remains appropriate for ongoing outpatient level of care.  A thorough assessment of risk factors related to suicide and self-harm have been reviewed and are noted above. The patient convincingly denies suicidality on several occasions. Local community safety resources printed  and reviewed for patient to use if needed. There was no deceit detected, and the patient presented in a manner that was believable.     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,   Specify if: In a controlled environment, Specify current severity:  305.1 (Z72.0) Mild    Medical comorbidities include:   Patient Active Problem List    Diagnosis Date Noted     Positive TODD (antinuclear antibody) 01/14/2019     Priority: Medium     Impaired fasting glucose 01/14/2019     Priority: Medium     Morbid obesity (H) 05/29/2018     Priority: Medium     Alcoholism (H) 05/29/2018     Priority: Medium     Suicide ideation 07/15/2017     Priority: Medium     Depression with anxiety 07/18/2016     Priority: Medium     Gastroesophageal reflux disease without esophagitis 07/18/2016     Priority: Medium       Assessment:    Nhung Concepcion reports that her mood has slightly evened out.  She continues to have periods of irritability under stress.  Home life is very busy for her.  When she took the mirtazapine at bedtime she was very groggy in the morning and so it was discontinued..  She continues to have difficulty sustaining a restful sleep.  I ordered her a 7-day supply of Lunesta 1 mg to be taken at bedtime to determine if this will help her sleep better.  Duloxetine will continue at 90 mg daily.  The Lamictal will now be increased to 100 mg daily.  Genetic test results were reviewed with her and a copy mailed to her home for consideration of other options to continue to regulate her mood, decrease her depression and  anxiety symptoms.    Medication side effects and alternatives were reviewed. Health promotion activities recommended and reviewed today. All questions addressed. Education and counseling completed regarding risks and benefits of medications and psychotherapy options.    Treatment Plan:    1.  Increase lamotrigine to 100 mg daily  2.  Discontinue mirtazapine  3.  Lunesta 1 mg at bedtime-7 doses ordered  4.  Continue duloxetine 90 mg daily  5.  Continue trauma therapy  6.  GeneSight test results reviewed and a copy will be mailed to your home      Continue all other medications as reviewed per electronic medical record today.     Safety plan reviewed. To the Emergency Department as needed or call after hours crisis line at [phone] or [phone]. [place] Crisis Text [place]. Text [place] to 167827 or Suicide LifeLine Chat: [employer].org/chat/    To schedule individual or family therapy, call [facility] at [phone].    Schedule an appointment with me in September or sooner as needed. Call [facility] at [phone] to schedule.    Follow up with primary care provider as planned or for acute medical concerns.    Call the psychiatric nurse line with medication questions or concerns at [phone].    Resoomayhart may be used to communicate with your provider, but this is not intended to be used for emergencies.    Crisis Resources:    National Suicide Prevention Lifeline: 880.564.6055 (TTY: 210.257.1856). Call anytime for help.  (www.suicidepreventionlifeline.org)  National [place] on Mental Illness (www.monalisa.org): 115.435.2582 or 125-213-5090.   Mental Health Association (www.mentalhealth.org): 427.116.7853 or 572-971-1287.  [place] Crisis Text [place]: Text [place] to 196999  Suicide LifeLine Chat: [employer].[email or web address]/chat    Administrative Billing:   Time spent with patient was 30 minutes and greater than 50% of time or 20 minutes was spent in counseling and  coordination of care regarding above diagnoses and treatment plan.    Patient Status:  Patient will continue to be seen for ongoing consultation and stabilization.    Signed:   LUCA Coleman-BC   Psychiatry

## 2020-08-13 NOTE — PATIENT INSTRUCTIONS
1.  Increase lamotrigine to 100 mg daily  2.  Discontinue mirtazapine  3.  Lunesta 1 mg at bedtime-7 doses ordered  4.  Continue duloxetine 90 mg daily  5.  Continue trauma therapy  6.  GeneSight test results reviewed and a copy will be mailed to your home      Continue all other medications as reviewed per electronic medical record today.     Safety plan reviewed. To the Emergency Department as needed or call after hours crisis line at [phone] or [phone]. [place] Crisis Text [place]. Text [place] to 764108 or Suicide LifeLine Chat: [email or web address]/chat/    To schedule individual or family therapy, call [facility] at [phone].    Schedule an appointment with me in September or sooner as needed. Call [facility] at [phone] to schedule.    Follow up with primary care provider as planned or for acute medical concerns.    Call the psychiatric nurse line with medication questions or concerns at [phone].    GMI may be used to communicate with your provider, but this is not intended to be used for emergencies.    Crisis Resources:    National Suicide Prevention Lifeline: 311.311.6448 (TTY: 751.654.9531). Call anytime for help.  (www.suicidepreventionlifeline.org)  National [place] on Mental Illness (www.monalisa.org): 971.231.5173 or 261-749-7084.   Mental Health Association (www.mentalhealth.org): 823.156.9877 or 170-799-2945.  [place] Crisis Text [place]: Text [place] to 938905  Suicide LifeLine Chat: [employer].org/chat

## 2020-08-14 ASSESSMENT — ANXIETY QUESTIONNAIRES: GAD7 TOTAL SCORE: 0

## 2020-08-25 ENCOUNTER — MYC REFILL (OUTPATIENT)
Dept: PEDIATRICS | Facility: CLINIC | Age: 30
End: 2020-08-25

## 2020-08-25 DIAGNOSIS — F17.219 CIGARETTE NICOTINE DEPENDENCE WITH NICOTINE-INDUCED DISORDER: ICD-10-CM

## 2020-08-25 NOTE — TELEPHONE ENCOUNTER
Routing refill request to provider for review/approval because:  Labs not current:  BP    Adeel DEJESUS RN, BSN

## 2020-09-14 ENCOUNTER — TELEPHONE (OUTPATIENT)
Dept: PEDIATRICS | Facility: CLINIC | Age: 30
End: 2020-09-14

## 2020-09-14 NOTE — TELEPHONE ENCOUNTER
General Call:     Who is calling:  Pt    Reason for Call:  Appointment    What are your questions or concerns:  PCOS - worsening - hoping to talk to AMolitor this week.  Offered appt 9/24/20-virtual; pt asking for call back this week.    Date of last appointment with provider: n/a    Okay to leave a detailed message:No at Home number on file [phone] (home)     Thank you.  fortunato

## 2020-09-15 NOTE — TELEPHONE ENCOUNTER
Routing to triage, patient would like callback this week and there is no availability.     Vivi Farah, EMT at 8:53 AM on September 15, 2020  [facility]   [phone]

## 2020-09-15 NOTE — TELEPHONE ENCOUNTER
Kimberly:    I spoke with the Pt. She would like to have a visit with you (she is ok with virtual). She really wants to see you sometime this week if possible.   The Pt reports having frequent bursting cysts, which are very painful. She reports in the past this would happen once per month, but now seems to be happening every other week. Periods seem to becoming more irregular as well.     She has never seen an OBGYN about this before, do you want to place a referral?    Belen Virgen, RN   [facility] -- Triage Nurse

## 2020-09-15 NOTE — TELEPHONE ENCOUNTER
I could see her for phone visit at 1:10 today if staff are available to room.     Otherwise, I don't have availability. She could do e-visit or schedule next wesalma

## 2020-09-15 NOTE — TELEPHONE ENCOUNTER
Called patient.  She is at work - cannot do today.  Scheduled for next Tuesday 9/22 am.  Magi Sanchez, CMA

## 2020-09-21 ENCOUNTER — TELEPHONE (OUTPATIENT)
Dept: PSYCHIATRY | Facility: CLINIC | Age: 30
End: 2020-09-21

## 2020-09-21 DIAGNOSIS — F33.1 MAJOR DEPRESSIVE DISORDER, RECURRENT EPISODE, MODERATE (H): ICD-10-CM

## 2020-09-21 RX ORDER — DULOXETIN HYDROCHLORIDE 60 MG/1
60 CAPSULE, DELAYED RELEASE ORAL DAILY
Qty: 30 CAPSULE | Refills: 1 | Status: SHIPPED | OUTPATIENT
Start: 2020-09-21 | End: 2020-11-11

## 2020-09-21 RX ORDER — DULOXETIN HYDROCHLORIDE 30 MG/1
30 CAPSULE, DELAYED RELEASE ORAL DAILY
Qty: 30 CAPSULE | Refills: 1 | Status: SHIPPED | OUTPATIENT
Start: 2020-09-21 | End: 2020-11-12

## 2020-09-21 NOTE — PROGRESS NOTES
"Nhung Concepcion is a 30 year old female who is being evaluated via a billable video visit.      The patient has been notified of following:     \"This video visit will be conducted via a call between you and your physician/provider. We have found that certain health care needs can be provided without the need for an in-person physical exam.  This service lets us provide the care you need with a video conversation.  If a prescription is necessary we can send it directly to your pharmacy.  If lab work is needed we can place an order for that and you can then stop by our lab to have the test done at a later time.    Video visits are billed at different rates depending on your insurance coverage.  Please reach out to your insurance provider with any questions.    If during the course of the call the physician/provider feels a video visit is not appropriate, you will not be charged for this service.\"    Patient has given verbal consent for Video visit? Yes  How would you like to obtain your AVS? MyChart  If you are dropped from the video visit, the video invite should be resent to: Send to e-mail at: [email or web address]  Will anyone else be joining your video visit? No    Subjective     Nhung Concepcion is a 30 year old female who presents today via video visit for the following health issues:    HPI  Discuss cysts/worsening of PCOS    Pt notes increased pain constantly x 2-3 weeks secondary to PCOS. Alternates sides with the sharp pain, but the dull pain is both sides all the time.  Pt reports increased facial hair.    Poops daily. Not constipated.    Has been intermittent fasting x 4 weeks    Pain with ovulation then dull pain until menses. Periods less regular. Facial hair worsening    Video Start Time: 7:15 AM      Review of Systems   Constitutional, HEENT, cardiovascular, pulmonary, gi and gu systems are negative, except as otherwise noted.      Objective           Vitals:  No vitals were obtained today due to " virtual visit.    Physical Exam     GENERAL: Healthy, alert and no distress  EYES: Eyes grossly normal to inspection.  No discharge or erythema, or obvious scleral/conjunctival abnormalities.  RESP: No audible wheeze, cough, or visible cyanosis.  No visible retractions or increased work of breathing.    SKIN: Visible skin clear. No significant rash, abnormal pigmentation or lesions.  NEURO: Cranial nerves grossly intact.  Mentation and speech appropriate for age.  PSYCH: Mentation appears normal, affect normal/bright, judgement and insight intact, normal speech and appearance well-groomed.      No results found for any visits on 09/22/20.        Assessment & Plan     Right ovarian cyst  Having pelvic pain alternating sides monthly around ovulation time. Hx complex right ovarian cyst that needs follow up. Not constipated.   - REVIEW OF HEALTH MAINTENANCE PROTOCOL ORDERS  - US Pelvic Complete with Transvaginal; Future  - Basic metabolic panel; Future  -Start spironolactone and nuvaring (had increased appetite with the pill). No longer smoking. No other contraindications. Reviewed risks)  -Pelvic US  -F/U 6 weeks      Hirsutism  -Start nuvaring  -Lose weight  - spironolactone (ALDACTONE) 50 MG tablet; Take 1 tablet (50 mg) by mouth 2 times daily    (K21.9) Gastroesophageal reflux disease, esophagitis presence not specified  Comment: Improved now that not drinking.   Plan: omeprazole (PRILOSEC) 20 MG DR capsule  -Reduce from 20 to 40 then consider 20 every other day then off    YEN Guo CNP  [facility]      Video-Visit Details    Type of service:  Video Visit    Video End Time:7:36 AM    Originating Location (pt. Location): Home    Distant Location (provider location):  [facility]     Platform used for Video Visit: BamWell

## 2020-09-21 NOTE — TELEPHONE ENCOUNTER
Writer left the patient a voicemail to connect with central scheduling to make a return appointment with the provider when available.

## 2020-09-21 NOTE — TELEPHONE ENCOUNTER
Refill for: DULoxetine (CYMBALTA) 30 MG capsule                  DULoxetine (CYMBALTA) 60 MG capsule    Last Appointment: 8/13/20    Next Appointment: none    No Shows/Cancellations since last appointment: none    Last Refill in Epic (date and amount/how many days):    Disp  Refills  Start  End  ELIER    DULoxetine (CYMBALTA) 30 MG capsule  30 capsule  0  8/13/2020   No    Sig - Route: Take 1 capsule (30 mg) by mouth daily take with 60mg to total 90 mg daily. - Oral       Disp  Refills  Start  End  ELIER    DULoxetine (CYMBALTA) 60 MG capsule  30 capsule  0  8/13/2020   No    Sig - Route: Take 1 capsule (60 mg) by mouth daily take with 30mg to total 90 mg daily. - Oral     Last office visit note reviewed and summarized below:  Treatment Plan:  1.  Increase lamotrigine to 100 mg daily  2.  Discontinue mirtazapine  3.  Lunesta 1 mg at bedtime-7 doses ordered  4.  Continue duloxetine 90 mg daily  5.  Continue trauma therapy  6.  [employer] test results reviewed and a copy will be mailed to your home      Refill x1 sent. Patient will need follow up for further refills.  Routing to [facility] to reach out to patient to assist in scheduling follow up with TAMICA Coleman-- if patient does not wish to schedule a follow up with provider, please inform to follow up with PCP and note account. Please notify nursing of outcome.    Patricia Coppola, RN   Nurse Liaison  [facility]

## 2020-09-22 ENCOUNTER — ANCILLARY PROCEDURE (OUTPATIENT)
Dept: ULTRASOUND IMAGING | Facility: CLINIC | Age: 30
End: 2020-09-22
Attending: NURSE PRACTITIONER
Payer: COMMERCIAL

## 2020-09-22 ENCOUNTER — TELEPHONE (OUTPATIENT)
Dept: PEDIATRICS | Facility: CLINIC | Age: 30
End: 2020-09-22

## 2020-09-22 ENCOUNTER — MYC MEDICAL ADVICE (OUTPATIENT)
Dept: PEDIATRICS | Facility: CLINIC | Age: 30
End: 2020-09-22

## 2020-09-22 ENCOUNTER — VIRTUAL VISIT (OUTPATIENT)
Dept: PEDIATRICS | Facility: CLINIC | Age: 30
End: 2020-09-22
Payer: COMMERCIAL

## 2020-09-22 VITALS — WEIGHT: 198 LBS | BODY MASS INDEX: 32.95 KG/M2

## 2020-09-22 DIAGNOSIS — R21 RASH: Primary | ICD-10-CM

## 2020-09-22 DIAGNOSIS — K21.9 GASTROESOPHAGEAL REFLUX DISEASE, ESOPHAGITIS PRESENCE NOT SPECIFIED: ICD-10-CM

## 2020-09-22 DIAGNOSIS — N83.201 RIGHT OVARIAN CYST: ICD-10-CM

## 2020-09-22 DIAGNOSIS — L68.0 HIRSUTISM: ICD-10-CM

## 2020-09-22 DIAGNOSIS — N83.209 CYST OF OVARY, UNSPECIFIED LATERALITY: Primary | ICD-10-CM

## 2020-09-22 DIAGNOSIS — N83.201 RIGHT OVARIAN CYST: Primary | ICD-10-CM

## 2020-09-22 PROCEDURE — 99214 OFFICE O/P EST MOD 30 MIN: CPT | Mod: 95 | Performed by: NURSE PRACTITIONER

## 2020-09-22 PROCEDURE — 76830 TRANSVAGINAL US NON-OB: CPT | Performed by: OBSTETRICS & GYNECOLOGY

## 2020-09-22 PROCEDURE — 76856 US EXAM PELVIC COMPLETE: CPT | Performed by: OBSTETRICS & GYNECOLOGY

## 2020-09-22 RX ORDER — ETONOGESTREL AND ETHINYL ESTRADIOL VAGINAL RING .015; .12 MG/D; MG/D
1 RING VAGINAL
Qty: 4 EACH | Refills: 4 | Status: SHIPPED | OUTPATIENT
Start: 2020-09-22 | End: 2023-02-22

## 2020-09-22 RX ORDER — SPIRONOLACTONE 50 MG/1
50 TABLET, FILM COATED ORAL 2 TIMES DAILY
Qty: 180 TABLET | Refills: 3 | Status: SHIPPED | OUTPATIENT
Start: 2020-09-22 | End: 2023-02-22

## 2020-09-22 NOTE — TELEPHONE ENCOUNTER
Hi Kimberly,   I think that the most important thing to assess would be if there are systemic symptoms or mucosal lesions as these would be the features of dangerous eruptions. Usually DRESS would come about 6 weeks after starting the med and SJS/TEN would be sooner, but would start with mucous membrane lesions.

## 2020-09-22 NOTE — TELEPHONE ENCOUNTER
Tarik Fuentes,    This patient started Lamictal several months ago. Has developed a new rash (please see photos). I would think a lamictal rash would be much worse/diffuse, but I just wanted to touch base to see if you think this could be related to lamictal use. It is pruritic. I am also having her notify her psychiatrist.     Thanks for your input!

## 2020-09-25 ENCOUNTER — MYC MEDICAL ADVICE (OUTPATIENT)
Dept: PEDIATRICS | Facility: CLINIC | Age: 30
End: 2020-09-25

## 2020-09-25 DIAGNOSIS — E28.2 PCOS (POLYCYSTIC OVARIAN SYNDROME): Primary | ICD-10-CM

## 2020-09-28 ENCOUNTER — TELEPHONE (OUTPATIENT)
Dept: ENDOCRINOLOGY | Facility: CLINIC | Age: 30
End: 2020-09-28

## 2020-09-28 NOTE — TELEPHONE ENCOUNTER
General Call:     Who is calling:  Nhung    Reason for Call:  Appointment possibly before 1/28/2020    What are your questions or concerns:  I have a referral from my PCP to see Ada Farah.      Date of last appointment with provider:     Okay to leave a detailed message:Yes at Home number on file [phone] (home)

## 2020-09-28 NOTE — TELEPHONE ENCOUNTER
Left message for patient to return my call to [phone].  Ivanna Duong CMA on 9/28/2020 at 11:48 AM

## 2020-09-30 NOTE — PROGRESS NOTES
"Nhung Concepcion is a 30 year old female who is being evaluated via a billable telephone visit.      The patient has been notified of following:     \"This telephone visit will be conducted via a call between you and your physician/provider. We have found that certain health care needs can be provided without the need for a physical exam.  This service lets us provide the care you need with a short phone conversation.  If a prescription is necessary we can send it directly to your pharmacy.  If lab work is needed we can place an order for that and you can then stop by our lab to have the test done at a later time.    Telephone visits are billed at different rates depending on your insurance coverage. During this emergency period, for some insurers they may be billed the same as an in-person visit.  Please reach out to your insurance provider with any questions.    If during the course of the call the physician/provider feels a telephone visit is not appropriate, you will not be charged for this service.\"    Patient has given verbal consent for Telephone visit?  Yes    What phone number would you like to be contacted at? [phone]    How would you like to obtain your AVS? Kathy    CC: PCOS    HPI:   Patient presents for evaluation of PCOS.   Diagnosis was made in 2017 when she presented with hirsutism.   She was seen by [facility] who placed her on metformin and NP thyroid for \"low T3\".   These were started in the spring of 2020.   She took them for ~ 6 weeks and stopped by her primary care after labs were normal in 6/2020.     Currently shaving every other day.     Not using any birth control currently.   Irregular menses. This is a chronic issue.     She has gained ~50 pounds over the last five years.   Runs for exercise but she has trouble with portion control.   Began intermittent fasting recently. 1077-3846 calories.     ROS: 10 point ROS neg other than the symptoms noted above in the HPI.    PMH: "   Patient Active Problem List   Diagnosis     Depression with anxiety     Gastroesophageal reflux disease without esophagitis     Suicide ideation     Morbid obesity (H)     Alcoholism (H)     Positive TODD (antinuclear antibody)     Impaired fasting glucose     Meds:  Current Outpatient Medications   Medication     DULoxetine (CYMBALTA) 30 MG capsule     DULoxetine (CYMBALTA) 60 MG capsule     eszopiclone (LUNESTA) 1 MG tablet     etonogestrel-ethinyl estradiol (NUVARING) 0.12-0.015 MG/24HR vaginal ring     lamoTRIgine (LAMICTAL) 100 MG tablet     nicotine polacrilex (NICORETTE) 4 MG gum     omeprazole (PRILOSEC) 20 MG DR capsule     spironolactone (ALDACTONE) 50 MG tablet     ondansetron (ZOFRAN-ODT) 4 MG ODT tab     No current facility-administered medications for this visit.      Facility-Administered Medications Ordered in Other Visits   Medication     benzocaine 20% (HURRICAINE/TOPEX) 20 % spray     FHX:   No known PCOS or DM.     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   Former smoker.     Exam:   GENERAL: Healthy, alert and no distress  EYES: Eyes grossly normal to inspection.  No discharge or erythema, or obvious scleral/conjunctival abnormalities.  NECK: No asymmetry, visible masses or scars  RESP: No audible wheeze, cough, or visible cyanosis.  No visible retractions or increased work of breathing.    MS: No gross musculoskeletal defects noted.  Normal range of motion.  No visible edema.  SKIN: Visible skin clear. No significant rash, abnormal pigmentation or lesions.  NEURO: Cranial nerves grossly intact.  Mentation and speech appropriate for age.  PSYCH: Mentation appears normal, affect normal/bright, judgement and insight intact, normal speech and appearance well-groomed.,    A/P:   PCOS - Outside labs reviewed. Remarkable for normal AMH, 17-OH progesterone, free and total testosterone. Single ovarian cyst. Needs a DHEA level and to rule out Cushing's to establish PCOS.   -Cardiac: We  discussed the DM and CV risk associated. Discussed metformin use.   -Goal of 30 minutes of aerobic exercise 5 days a week.   -Goal of < 1500 calories a day.   -HbA1C: 5.6% in 6/2020.   -Lipids: due.   -Reproductive: She has an ovarian cyst but not polycystic ovaries. Explained that treatment of PCOS is not going to impact the size of a singular cyst. She is in a lesbian relationship and not on OCP.   -Hair: shaving every other day. Discussed aldactone use.     -->DHEA, testosterone, HbA1C, Lipid panel, [place] salivary cortisol X2. If confirms PCOS, will start metformin and aldactone.     History low T3 - Explained free T3 is highly variable and alone does not make any diagnosis.   -Repeat labs including TPO given her +TODD.     Due to the COVID 19 pandemic this visit was a telephone/video visit in order to help prevent spread of infection in this high risk patient and the general population. The patient gave verbal consent for the visit today.    Start time 1602  Stop time 1637  Total time 35      Francesco Abad MD on 10/1/2020 at 4:37 PM

## 2020-10-01 ENCOUNTER — VIRTUAL VISIT (OUTPATIENT)
Dept: ENDOCRINOLOGY | Facility: CLINIC | Age: 30
End: 2020-10-01
Payer: COMMERCIAL

## 2020-10-01 DIAGNOSIS — R94.6 ABNORMAL FINDING ON THYROID FUNCTION TEST: ICD-10-CM

## 2020-10-01 DIAGNOSIS — R76.8 POSITIVE ANA (ANTINUCLEAR ANTIBODY): Primary | ICD-10-CM

## 2020-10-01 DIAGNOSIS — E28.2 PCOS (POLYCYSTIC OVARIAN SYNDROME): ICD-10-CM

## 2020-10-01 PROCEDURE — 99203 OFFICE O/P NEW LOW 30 MIN: CPT | Mod: 95 | Performed by: INTERNAL MEDICINE

## 2020-10-01 NOTE — LETTER
"    10/1/2020         RE: Nhung Concepcion  [address] [facility] [address]        Dear Colleague,    Thank you for referring your patient, Nhung Concepcion, to the [facility] END[facility]. Please see a copy of my visit note below.    Nhung Concepcion is a 30 year old female who is being evaluated via a billable telephone visit.      The patient has been notified of following:     \"This telephone visit will be conducted via a call between you and your physician/provider. We have found that certain health care needs can be provided without the need for a physical exam.  This service lets us provide the care you need with a short phone conversation.  If a prescription is necessary we can send it directly to your pharmacy.  If lab work is needed we can place an order for that and you can then stop by our lab to have the test done at a later time.    Telephone visits are billed at different rates depending on your insurance coverage. During this emergency period, for some insurers they may be billed the same as an in-person visit.  Please reach out to your insurance provider with any questions.    If during the course of the call the physician/provider feels a telephone visit is not appropriate, you will not be charged for this service.\"    Patient has given verbal consent for Telephone visit?  Yes    What phone number would you like to be contacted at? [phone]    How would you like to obtain your AVS? Kathy    CC: PCOS    HPI:   Patient presents for evaluation of PCOS.   Diagnosis was made in 2017 when she presented with hirsutism.   She was seen by [facility] who placed her on metformin and NP thyroid for \"low T3\".   These were started in the spring of 2020.   She took them for ~ 6 weeks and stopped by her primary care after labs were normal in 6/2020.     Currently shaving every other day.     Not using any birth control currently.   Irregular menses. This is a " chronic issue.     She has gained ~50 pounds over the last five years.   Runs for exercise but she has trouble with portion control.   Began intermittent fasting recently. 5938-9533 calories.     ROS: 10 point ROS neg other than the symptoms noted above in the HPI.    PMH:   Patient Active Problem List   Diagnosis     Depression with anxiety     Gastroesophageal reflux disease without esophagitis     Suicide ideation     Morbid obesity (H)     Alcoholism (H)     Positive TODD (antinuclear antibody)     Impaired fasting glucose     Meds:  Current Outpatient Medications   Medication     DULoxetine (CYMBALTA) 30 MG capsule     DULoxetine (CYMBALTA) 60 MG capsule     eszopiclone (LUNESTA) 1 MG tablet     etonogestrel-ethinyl estradiol (NUVARING) 0.12-0.015 MG/24HR vaginal ring     lamoTRIgine (LAMICTAL) 100 MG tablet     nicotine polacrilex (NICORETTE) 4 MG gum     omeprazole (PRILOSEC) 20 MG DR capsule     spironolactone (ALDACTONE) 50 MG tablet     ondansetron (ZOFRAN-ODT) 4 MG ODT tab     No current facility-administered medications for this visit.      Facility-Administered Medications Ordered in Other Visits   Medication     benzocaine 20% (HURRICAINE/TOPEX) 20 % spray     FHX:   No known PCOS or DM.     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     Exam:   GENERAL: Healthy, alert and no distress  EYES: Eyes grossly normal to inspection.  No discharge or erythema, or obvious scleral/conjunctival abnormalities.  NECK: No asymmetry, visible masses or scars  RESP: No audible wheeze, cough, or visible cyanosis.  No visible retractions or increased work of breathing.    MS: No gross musculoskeletal defects noted.  Normal range of motion.  No visible edema.  SKIN: Visible skin clear. No significant rash, abnormal pigmentation or lesions.  NEURO: Cranial nerves grossly intact.  Mentation and speech appropriate for age.  PSYCH: Mentation appears normal, affect normal/bright, judgement and insight  intact, normal speech and appearance well-groomed.,    A/P:   PCOS - Outside labs reviewed. Remarkable for normal AMH, 17-OH progesterone, free and total testosterone. Single ovarian cyst. Needs a DHEA level and to rule out Cushing's to establish PCOS.   -Cardiac: We discussed the DM and CV risk associated. Discussed metformin use.   -Goal of 30 minutes of aerobic exercise 5 days a week.   -Goal of < 1500 calories a day.   -HbA1C: 5.6% in 6/2020.   -Lipids: due.   -Reproductive: She has an ovarian cyst but not polycystic ovaries. Explained that treatment of PCOS is not going to impact the size of a singular cyst. She is in a lesbian relationship and not on OCP.   -Hair: shaving every other day. Discussed aldactone use.     -->DHEA, testosterone, HbA1C, Lipid panel, [place] salivary cortisol X2. If confirms PCOS, will start metformin and aldactone.     History low T3 - Explained free T3 is highly variable and alone does not make any diagnosis.   -Repeat labs including TPO given her +TODD.     Due to the COVID 19 pandemic this visit was a telephone/video visit in order to help prevent spread of infection in this high risk patient and the general population. The patient gave verbal consent for the visit today.    Start time 1602  Stop time 1637  Total time 35      Francesco Abad MD on 10/1/2020 at 4:37 PM              Again, thank you for allowing me to participate in the care of your patient.        Sincerely,        Francesco Abad MD

## 2020-10-02 DIAGNOSIS — E28.2 PCOS (POLYCYSTIC OVARIAN SYNDROME): ICD-10-CM

## 2020-10-02 DIAGNOSIS — R94.6 ABNORMAL FINDING ON THYROID FUNCTION TEST: ICD-10-CM

## 2020-10-02 LAB
CHOLEST SERPL-MCNC: 175 MG/DL
HBA1C MFR BLD: 5.4 % (ref 0–5.6)
HDLC SERPL-MCNC: 40 MG/DL
LDLC SERPL CALC-MCNC: 100 MG/DL
NONHDLC SERPL-MCNC: 135 MG/DL
T3FREE SERPL-MCNC: 2.4 PG/ML (ref 2.3–4.2)
T4 FREE SERPL-MCNC: 0.84 NG/DL (ref 0.76–1.46)
TRIGL SERPL-MCNC: 174 MG/DL
TSH SERPL DL<=0.005 MIU/L-ACNC: 0.7 MU/L (ref 0.4–4)

## 2020-10-02 PROCEDURE — 84481 FREE ASSAY (FT-3): CPT | Performed by: INTERNAL MEDICINE

## 2020-10-02 PROCEDURE — 84439 ASSAY OF FREE THYROXINE: CPT | Performed by: INTERNAL MEDICINE

## 2020-10-02 PROCEDURE — 36415 COLL VENOUS BLD VENIPUNCTURE: CPT | Performed by: INTERNAL MEDICINE

## 2020-10-02 PROCEDURE — 86376 MICROSOMAL ANTIBODY EACH: CPT | Performed by: INTERNAL MEDICINE

## 2020-10-02 PROCEDURE — 99000 SPECIMEN HANDLING OFFICE-LAB: CPT | Performed by: INTERNAL MEDICINE

## 2020-10-02 PROCEDURE — 82627 DEHYDROEPIANDROSTERONE: CPT | Performed by: INTERNAL MEDICINE

## 2020-10-02 PROCEDURE — 80061 LIPID PANEL: CPT | Performed by: INTERNAL MEDICINE

## 2020-10-02 PROCEDURE — 84270 ASSAY OF SEX HORMONE GLOBUL: CPT | Performed by: INTERNAL MEDICINE

## 2020-10-02 PROCEDURE — 84403 ASSAY OF TOTAL TESTOSTERONE: CPT | Mod: 90 | Performed by: INTERNAL MEDICINE

## 2020-10-02 PROCEDURE — 83036 HEMOGLOBIN GLYCOSYLATED A1C: CPT | Performed by: INTERNAL MEDICINE

## 2020-10-02 PROCEDURE — 84443 ASSAY THYROID STIM HORMONE: CPT | Performed by: INTERNAL MEDICINE

## 2020-10-05 LAB
DHEA-S SERPL-MCNC: 280 UG/DL (ref 35–430)
THYROPEROXIDASE AB SERPL-ACNC: <10 IU/ML

## 2020-10-06 LAB
SHBG SERPL-SCNC: 18 NMOL/L (ref 30–135)
TESTOST FREE SERPL-MCNC: 0.44 NG/DL (ref 0.13–0.92)
TESTOST SERPL-MCNC: 18 NG/DL (ref 8–60)

## 2020-10-13 DIAGNOSIS — F39 MOOD DISORDER (H): ICD-10-CM

## 2020-10-13 RX ORDER — LAMOTRIGINE 100 MG/1
100 TABLET ORAL DAILY
Qty: 30 TABLET | Refills: 1 | Status: CANCELLED | OUTPATIENT
Start: 2020-10-13

## 2020-10-13 NOTE — TELEPHONE ENCOUNTER
"Requested Prescriptions   Pending Prescriptions Disp Refills     lamoTRIgine (LAMICTAL) 100 MG tablet 30 tablet 1     Sig: Take 1 tablet (100 mg) by mouth daily       Anti-Seizure Meds Protocol  Failed - 10/13/2020  3:34 PM        Failed - Review Authorizing provider's last note.      Refer to last progress notes: confirm request is for original authorizing provider (cannot be through other providers).          Passed - Recent (12 mo) or future (30 days) visit within the authorizing provider's specialty     Patient has had an office visit with the authorizing provider or a provider within the authorizing providers department within the previous 12 mos or has a future within next 30 days. See \"Patient Info\" tab in inbasket, or \"Choose Columns\" in Meds & Orders section of the refill encounter.              Passed - Normal CBC on file in past 26 months     Recent Labs   Lab Test 02/09/19  1821   WBC 8.5   RBC 4.17   HGB 12.6   HCT 37.7                    Passed - Normal ALT or AST on file in past 26 months     Recent Labs   Lab Test 02/09/19  1821   ALT 33     Recent Labs   Lab Test 02/09/19  1821   AST 21             Passed - Normal platelet count on file in past 26 months     Recent Labs   Lab Test 02/09/19  1821                  Passed - Medication is active on med list        Passed - No active pregnancy on record        Passed - No positive pregnancy test in last 12 months             "

## 2020-10-14 DIAGNOSIS — E28.2 PCOS (POLYCYSTIC OVARIAN SYNDROME): ICD-10-CM

## 2020-10-14 PROCEDURE — 99000 SPECIMEN HANDLING OFFICE-LAB: CPT | Performed by: INTERNAL MEDICINE

## 2020-10-14 PROCEDURE — 82530 CORTISOL FREE: CPT | Mod: 90 | Performed by: INTERNAL MEDICINE

## 2020-10-17 LAB
COLLECT TME SPEC: 30
CORTIS SAL-MCNC: 1.2 UG/DL

## 2020-10-19 DIAGNOSIS — E66.01 MORBID OBESITY (H): Primary | ICD-10-CM

## 2020-10-20 ENCOUNTER — MYC MEDICAL ADVICE (OUTPATIENT)
Dept: PEDIATRICS | Facility: CLINIC | Age: 30
End: 2020-10-20

## 2020-10-20 DIAGNOSIS — E66.01 MORBID OBESITY (H): ICD-10-CM

## 2020-10-20 PROCEDURE — 82530 CORTISOL FREE: CPT | Mod: 90 | Performed by: INTERNAL MEDICINE

## 2020-10-20 PROCEDURE — 99000 SPECIMEN HANDLING OFFICE-LAB: CPT | Performed by: INTERNAL MEDICINE

## 2020-10-20 NOTE — TELEPHONE ENCOUNTER
We can determine need for follow-up with ob based on pelvic us results, if that's what she's asking.

## 2020-10-22 LAB
COLLECT TME SPEC: NORMAL
CORTIS SAL-MCNC: 0.13 UG/DL

## 2020-10-23 DIAGNOSIS — R79.89 ABNORMAL CORTISOL LEVEL: Primary | ICD-10-CM

## 2020-10-28 DIAGNOSIS — R79.89 ABNORMAL CORTISOL LEVEL: ICD-10-CM

## 2020-10-28 LAB
COLLECT DURATION TIME UR: 24 H
CREAT 24H UR-MRATE: 1.81 G/(24.H) (ref 0.8–1.8)
CREAT UR-MCNC: 95 MG/DL
SPECIMEN VOL UR: 1910 ML

## 2020-10-28 PROCEDURE — 81050 URINALYSIS VOLUME MEASURE: CPT | Performed by: INTERNAL MEDICINE

## 2020-10-28 PROCEDURE — 82570 ASSAY OF URINE CREATININE: CPT | Performed by: INTERNAL MEDICINE

## 2020-10-28 PROCEDURE — 82530 CORTISOL FREE: CPT | Performed by: INTERNAL MEDICINE

## 2020-11-03 LAB
COLLECT DURATION TIME SPEC: 24 H
CORTIS F 24H UR HPLC-MCNC: 8.73 UG/L
CORTIS F 24H UR-MRATE: 16.7 UG/D
CORTIS F/CREAT 24H UR: 9.49 UG/G CRT
CREAT 24H UR-MRATE: 1757 MG/D (ref 700–1600)
CREAT UR-MCNC: 92 MG/DL
IMP & REVIEW OF LAB RESULTS: ABNORMAL
SPECIMEN VOL ?TM UR: 1910 ML

## 2020-11-04 ENCOUNTER — ANCILLARY PROCEDURE (OUTPATIENT)
Dept: ULTRASOUND IMAGING | Facility: CLINIC | Age: 30
End: 2020-11-04
Attending: NURSE PRACTITIONER
Payer: COMMERCIAL

## 2020-11-04 DIAGNOSIS — N83.209 CYST OF OVARY, UNSPECIFIED LATERALITY: ICD-10-CM

## 2020-11-04 PROCEDURE — 76830 TRANSVAGINAL US NON-OB: CPT | Performed by: OBSTETRICS & GYNECOLOGY

## 2020-11-04 PROCEDURE — 76856 US EXAM PELVIC COMPLETE: CPT | Performed by: OBSTETRICS & GYNECOLOGY

## 2020-11-11 ENCOUNTER — MYC REFILL (OUTPATIENT)
Dept: PSYCHIATRY | Facility: CLINIC | Age: 30
End: 2020-11-11

## 2020-11-11 ENCOUNTER — MYC MEDICAL ADVICE (OUTPATIENT)
Dept: ENDOCRINOLOGY | Facility: CLINIC | Age: 30
End: 2020-11-11

## 2020-11-11 DIAGNOSIS — F33.1 MAJOR DEPRESSIVE DISORDER, RECURRENT EPISODE, MODERATE (H): ICD-10-CM

## 2020-11-12 RX ORDER — DULOXETIN HYDROCHLORIDE 30 MG/1
30 CAPSULE, DELAYED RELEASE ORAL DAILY
Qty: 30 CAPSULE | Refills: 1 | Status: SHIPPED | OUTPATIENT
Start: 2020-11-12 | End: 2023-02-27

## 2020-11-12 RX ORDER — DULOXETIN HYDROCHLORIDE 60 MG/1
60 CAPSULE, DELAYED RELEASE ORAL DAILY
Qty: 30 CAPSULE | Refills: 1 | Status: SHIPPED | OUTPATIENT
Start: 2020-11-12

## 2020-11-12 NOTE — TELEPHONE ENCOUNTER
Refill for: Cymbalta 60 mg and 30 mg    Last Appointment:8-13-20    Next Appointment: None    No Shows/Cancellations since last appointment: None    Last Refill in Epic (date and amount/how many days):   Cymbalta 30 mg  # 30 with 1 refill on 9-21-20  Cymbalta 60 mg  # 30 with 1 refill on 9-21-20    Last office visit note reviewed and summarized below:  Treatment Plan:     1.  Increase lamotrigine to 100 mg daily  2.  Discontinue mirtazapine  3.  Lunesta 1 mg at bedtime-7 doses ordered  4.  Continue duloxetine 90 mg daily  5.  Continue trauma therapy  6.  [facility] test results reviewed and a copy will be mailed to your home       Continue all other medications as reviewed per electronic medical record today.     Safety plan reviewed. To the Emergency Department as needed or call after hours crisis line at [phone] or [phone]. [place] Crisis Text Line. Text [place] to 439014 or Suicide LifeLine Chat: suicidepreventionlifeline.org/chat/    To schedule individual or family therapy, call [facility] at [phone].    Schedule an appointment with me in September or sooner as needed. Call [facility] at [phone] to schedule.    Follow up with primary care provider as planned or for acute medical concerns.    Call the psychiatric nurse line with medication questions or concerns at [phone].    [employer] (Inspect Manager)hart may be used to communicate with your provider, but this is not intended to be used for emergencies.            Maria Esther Virk, RN   Nurse Liaison  [facility]

## 2020-11-12 NOTE — TELEPHONE ENCOUNTER
Refill for:   Pending Prescriptions:                       Disp   Refills    DULoxetine (CYMBALTA) 60 MG capsule       30 cap*1            Sig: Take 1 capsule (60 mg) by mouth daily take with           30mg to total 90 mg daily.    DULoxetine (CYMBALTA) 30 MG capsule       30 cap*1            Sig: Take 1 capsule (30 mg) by mouth daily take with           60mg to total 90 mg daily.      Last Appointment: 8/13/20  Next Appointment: none  No Shows/Cancellations since last appointment: none      Patient self dismissed from clinic on 10/13, however PCP is on maternity leave so is requesting to continue with Laurie until her PCP is back.       Medication pended.  Routing to provider for review.      Patricia Coppola, RN   Nurse Liaison  [facility]

## 2020-11-18 ENCOUNTER — E-VISIT (OUTPATIENT)
Dept: URGENT CARE | Facility: URGENT CARE | Age: 30
End: 2020-11-18
Payer: COMMERCIAL

## 2020-11-18 DIAGNOSIS — Z20.822 SUSPECTED COVID-19 VIRUS INFECTION: Primary | ICD-10-CM

## 2020-11-18 DIAGNOSIS — Z20.822 SUSPECTED COVID-19 VIRUS INFECTION: ICD-10-CM

## 2020-11-18 PROCEDURE — 99421 OL DIG E/M SVC 5-10 MIN: CPT | Performed by: PHYSICIAN ASSISTANT

## 2020-11-18 PROCEDURE — U0003 INFECTIOUS AGENT DETECTION BY NUCLEIC ACID (DNA OR RNA); SEVERE ACUTE RESPIRATORY SYNDROME CORONAVIRUS 2 (SARS-COV-2) (CORONAVIRUS DISEASE [COVID-19]), AMPLIFIED PROBE TECHNIQUE, MAKING USE OF HIGH THROUGHPUT TECHNOLOGIES AS DESCRIBED BY CMS-2020-01-R: HCPCS | Performed by: PHYSICIAN ASSISTANT

## 2020-11-18 NOTE — PATIENT INSTRUCTIONS
Dear Nhung Concepcion,    Your symptoms show that you may have coronavirus (COVID-19). This illness can cause fever, cough and trouble breathing. Many people get a mild case and get better on their own. Some people can get very sick.    Will I be tested for COVID-19?  We would like to test you for Covid-19 virus. I have placed orders for this test.     For all employees or close contacts (except [employer] [place] and [employer] - see below), go to your  home page and scroll down to the section that says  You have an appointment that needs to be scheduled  and click the large green button that says  Schedule Now  and follow the steps to find the next available opening.     If you are unable to complete these steps or if you cannot find any available times, please call [phone] to schedule employee testing.       [employer] [place] employees or close contacts, please call [phone].   [place] [employer]) employees or close contacts call [phone].    When it's time for your COVID test:  Stay at least 6 feet away from others. (If someone will drive you to your test, stay in the backseat, as far away from the  as you can.)  Cover your mouth and nose with a mask, tissue or washcloth.  Go straight to the testing site. Don't make any stops on the way there or back.    Starting now:     Do not go to work.   o If you receive a negative COVID-19 test result and were NOT exposed to someone with a known positive COVID-19 test, you can return to work once you're free of fever for 24 hours without fever-reducing medication and your symptoms are improving or resolved.  o If you receive a positive COVID-19 test result, you must be cleared by Employee Occupational Health and Safety to return to work.   o If you were exposed to someone who has tested positive for COVID-19, you can return to work 14 days after your last contact with the positive individual, provided you do not have symptoms at all during that time. In some cases,  "your manager may ask you to come back sooner than 14 days.     During this time, don't leave the house except for testing or medical care.  o Stay in your own room, even for meals. Use your own bathroom if you can.  o Stay away from others in your home. No hugging, kissing or shaking hands. No visitors.  o Don't go to work, school or anywhere else.    Clean \"high touch\" surfaces often (doorknobs, counters, handles, etc.). Use a household cleaning spray or wipes. You'll find a full list of  on the EPA website: www.epa.gov/pesticide-registration/list-n-disinfectants-use-against-sars-cov-2.    Cover your mouth and nose with a mask, tissue or washcloth to avoid spreading germs.    Wash your hands and face often. Use soap and water.    People in these groups are at risk for severe illness due to COVID-19:  o People 65 years and older  o People who live in a nursing home or long-term care facility  o People with chronic disease (lung, heart, cancer, diabetes, kidney, liver, immunologic)  o People who have a weakened immune system, including those who:  - Are in cancer treatment  - Take medicine that weakens the immune system, such as corticosteroids  - Had a bone marrow or organ transplant  - Have an immune deficiency  - Have poorly controlled HIV or AIDS  - Are obese (body mass index of 40 or higher)  - Smoke regularly      Caregivers should wear gloves while washing dishes, handling laundry and cleaning bedrooms and bathrooms.    Use caution when washing and drying laundry: Don't shake dirty laundry, and use the warmest water setting that you can.    For more tips, go to www.cdc.gov/coronavirus/2019-ncov/downloads/10Things.pdf.    Sign up for [employer]. We know it's scary to hear that you might have COVID-19. We want to track your symptoms to make sure you're okay over the next 2 weeks. Please look for an email from [employer]--this is a free, online program that we'll use to keep in touch. To sign up, " follow the link in the email you will receive. Learn more at [email or web address]    How can I take care of myself?    Get lots of rest. Drink extra fluids (unless a doctor has told you not to)    Take Tylenol (acetaminophen) for fever or pain. If you have liver or kidney problems, ask your family doctor if it's okay to take Tylenol.  Adults can take either:    650 mg (two 325 mg pills) every 4 to 6 hours, or     1,000 mg (two 500 mg pills) every 8 hours as needed.    Note: Don't take more than 3,000 mg in one day. Acetaminophen is found in many medicines (both prescribed and over-the-counter medicines). Read all labels to be sure you don't take too much.  For children, check the Tylenol bottle for the right dose. The dose is based on the child's age or weight.    If you have other health problems (like cancer, heart failure, an organ transplant or severe kidney disease): Call your specialty clinic if you don't feel better in the next 2 days.    Know when to call 911. Emergency warning signs include:  Trouble breathing or shortness of breath  Pain or pressure in the chest that doesn't go away  Feeling confused like you haven't felt before, or not being able to wake up  Bluish-colored lips or face    Where can I get more information?    [facility] - About COVID-19: [email or web address]  CDC - What to Do If You're Sick: www.cdc.gov/coronavirus/2019-ncov/about/steps-when-sick.html  November 18, 2020    RE:  Nhung Concepcion                                                                                                                                                       [address] [place] [id number]        To whom it may concern:    I evaluated Nhung Concepcion on 11/18/20. Nhung Concepcion should be excused from work/school.     Do not go to work.      If you receive a negative COVID-19 test result and were NOT exposed to someone with a known positive COVID-19 test,  you can return to work once you're free of fever for 24 hours without fever-reducing medication and your symptoms are improving or resolved.    If you receive a positive COVID-19 test result, you must be cleared by Employee Occupational Health and Safety to return to work.     If you were exposed to someone who has tested positive for COVID-19, you can return to work 14 days after your last contact with the positive individual, provided you do not have symptoms at all during that time. In some cases, your manager may ask you to come back sooner than 14 days.       Sincerely,  Zack Barragan PA-C

## 2020-11-19 LAB
SARS-COV-2 RNA SPEC QL NAA+PROBE: NOT DETECTED
SPECIMEN SOURCE: NORMAL

## 2020-12-08 ENCOUNTER — MYC MEDICAL ADVICE (OUTPATIENT)
Dept: PSYCHIATRY | Facility: CLINIC | Age: 30
End: 2020-12-08

## 2020-12-08 DIAGNOSIS — F17.219 CIGARETTE NICOTINE DEPENDENCE WITH NICOTINE-INDUCED DISORDER: ICD-10-CM

## 2020-12-09 NOTE — TELEPHONE ENCOUNTER
Patient self-dismissed from [facility] clinic on 10/14.   Routing to PCP clinic to manage request.   If PCP is on maternity leave, providers covering can review.    Patricia Coppola, RN    Nurse Liaison  [facility]

## 2020-12-15 ENCOUNTER — MYC MEDICAL ADVICE (OUTPATIENT)
Dept: PSYCHOLOGY | Facility: CLINIC | Age: 30
End: 2020-12-15

## 2020-12-15 ENCOUNTER — VIRTUAL VISIT (OUTPATIENT)
Dept: INTERNAL MEDICINE | Facility: CLINIC | Age: 30
End: 2020-12-15
Payer: COMMERCIAL

## 2020-12-15 ENCOUNTER — MYC REFILL (OUTPATIENT)
Dept: PSYCHIATRY | Facility: CLINIC | Age: 30
End: 2020-12-15

## 2020-12-15 DIAGNOSIS — F39 MOOD DISORDER (H): ICD-10-CM

## 2020-12-15 DIAGNOSIS — F33.1 MAJOR DEPRESSIVE DISORDER, RECURRENT EPISODE, MODERATE (H): ICD-10-CM

## 2020-12-15 DIAGNOSIS — L68.0 HIRSUTISM: ICD-10-CM

## 2020-12-15 PROCEDURE — 99213 OFFICE O/P EST LOW 20 MIN: CPT | Mod: 95 | Performed by: INTERNAL MEDICINE

## 2020-12-15 RX ORDER — LAMOTRIGINE 100 MG/1
100 TABLET ORAL DAILY
Qty: 30 TABLET | Refills: 0 | Status: SHIPPED | OUTPATIENT
Start: 2020-12-15 | End: 2023-11-01

## 2020-12-15 RX ORDER — LAMOTRIGINE 100 MG/1
100 TABLET ORAL DAILY
Qty: 15 TABLET | Refills: 0 | Status: SHIPPED | OUTPATIENT
Start: 2020-12-15 | End: 2020-12-15

## 2020-12-15 RX ORDER — LAMOTRIGINE 100 MG/1
100 TABLET ORAL DAILY
Qty: 30 TABLET | Refills: 1 | Status: CANCELLED | OUTPATIENT
Start: 2020-12-15

## 2020-12-15 ASSESSMENT — ANXIETY QUESTIONNAIRES
1. FEELING NERVOUS, ANXIOUS, OR ON EDGE: NOT AT ALL
IF YOU CHECKED OFF ANY PROBLEMS ON THIS QUESTIONNAIRE, HOW DIFFICULT HAVE THESE PROBLEMS MADE IT FOR YOU TO DO YOUR WORK, TAKE CARE OF THINGS AT HOME, OR GET ALONG WITH OTHER PEOPLE: NOT DIFFICULT AT ALL
6. BECOMING EASILY ANNOYED OR IRRITABLE: SEVERAL DAYS
7. FEELING AFRAID AS IF SOMETHING AWFUL MIGHT HAPPEN: NOT AT ALL
5. BEING SO RESTLESS THAT IT IS HARD TO SIT STILL: NOT AT ALL
3. WORRYING TOO MUCH ABOUT DIFFERENT THINGS: SEVERAL DAYS
GAD7 TOTAL SCORE: 2
2. NOT BEING ABLE TO STOP OR CONTROL WORRYING: NOT AT ALL

## 2020-12-15 ASSESSMENT — PATIENT HEALTH QUESTIONNAIRE - PHQ9
5. POOR APPETITE OR OVEREATING: NOT AT ALL
SUM OF ALL RESPONSES TO PHQ QUESTIONS 1-9: 1

## 2020-12-15 NOTE — PROGRESS NOTES
"Nhung Concepcion is a 30 year old female who is being evaluated via a billable video visit.      The patient has been notified of following:     \"This video visit will be conducted via a call between you and your physician/provider. We have found that certain health care needs can be provided without the need for an in-person physical exam.  This service lets us provide the care you need with a video conversation.  If a prescription is necessary we can send it directly to your pharmacy.  If lab work is needed we can place an order for that and you can then stop by our lab to have the test done at a later time.    Video visits are billed at different rates depending on your insurance coverage.  Please reach out to your insurance provider with any questions.    If during the course of the call the physician/provider feels a video visit is not appropriate, you will not be charged for this service.\"    Patient has given verbal consent for Video visit? Yes  How would you like to obtain your AVS? MyChart  If you are dropped from the video visit, the video invite should be resent to: MyChart  Will anyone else be joining your video visit? No    Subjective     Nhung Concepcion is a 30 year old female who presents today via video visit for the following health issues:    HPI     Depression and Anxiety Follow-Up    How are you doing with your depression since your last visit? No change    How are you doing with your anxiety since your last visit?  No change    Are you having other symptoms that might be associated with depression or anxiety? No    Have you had a significant life event? No     Do you have any concerns with your use of alcohol or other drugs? No    **takgin duloxetine and lamictal for management of depression/anxiety/mood disorder previously prescribed through the psychiatry clinic.   She feels it is working well.   As such, she was going to have the prescribing for this transferred to her usual primary provider, " but she will be changing clinic systems due to insurance change on  and has not yet found new primary provider.     She needs bridging prescription for Lamictal sent in.      Social History     Tobacco Use     Smoking status: Former Smoker     Packs/day: 0.50     Types: Cigarettes     Quit date: 2020     Years since quittin.9     Smokeless tobacco: Never Used     Tobacco comment: Currently using 4 mg Gum   Substance Use Topics     Alcohol use: Not Currently     Drug use: No     PHQ 2020   PHQ-9 Total Score 8 10 1   Q9: Thoughts of better off dead/self-harm past 2 weeks Several days Several days Not at all   F/U: Thoughts of suicide or self-harm No No -   F/U: Safety concerns No No -     CHANDNI-7 SCORE 2020   Total Score 7 (mild anxiety) 10 (moderate anxiety) -   Total Score 7 10 0         Suicide Assessment Five-step Evaluation and Treatment (SAFE-T)      How many servings of fruits and vegetables do you eat daily?  0-1    On average, how many sweetened beverages do you drink each day (Examples: soda, juice, sweet tea, etc.  Do NOT count diet or artificially sweetened beverages)?   0    How many days per week do you miss taking your medication? 0                 Review of Systems   Constitutional, HEENT, cardiovascular, pulmonary, gi and gu systems are negative, except as otherwise noted.      Objective           Vitals:  No vitals were obtained today due to virtual visit.    Physical Exam     GENERAL: Healthy, alert and no distress  EYES: Eyes grossly normal to inspection.  No discharge or erythema, or obvious scleral/conjunctival abnormalities.  RESP: No audible wheeze, cough, or visible cyanosis.  No visible retractions or increased work of breathing.    SKIN: Visible skin clear. No significant rash, abnormal pigmentation or lesions.  NEURO: Cranial nerves grossly intact.  Mentation and speech appropriate for age.  PSYCH: Mentation appears normal, affect  normal/bright, judgement and insight intact, normal speech and appearance well-groomed.              (F39) Mood disorder (H)  Comment: OK for bridging prescription.   On review of chart, an RX for Lamictal #30 was literally just sent in by the psychiatry clinic at 1:32 pm.   She has refills available into mid January for her other medications.   Establish care with new provider as dictated by her insurance.   Re-evaluate medications with new provider.    Plan:       (L68.0) Hirsutism  Comment: not taking spironolactone regularly.   Plan: follow up with new primary provider and possibly endocrinology regarding this medication.             Video-Visit Details    Type of service:  Video Visit    Video Start Time: 1:46 PM    Video End Time:1:56 PM    Originating Location (pt. Location): Home    Distant Location (provider location):  [facility]     Platform used for Video Visit: [employer]

## 2020-12-15 NOTE — PATIENT INSTRUCTIONS
*  Continue all medications at the same doses.  Contact your usual pharmacy if you need refills.     *  Establish care with new primary provider at a clinic preferred by your new insurance.

## 2020-12-16 ASSESSMENT — ANXIETY QUESTIONNAIRES: GAD7 TOTAL SCORE: 2

## 2020-12-21 ENCOUNTER — MYC MEDICAL ADVICE (OUTPATIENT)
Dept: INTERNAL MEDICINE | Facility: CLINIC | Age: 30
End: 2020-12-21

## 2020-12-21 DIAGNOSIS — F17.219 CIGARETTE NICOTINE DEPENDENCE WITH NICOTINE-INDUCED DISORDER: ICD-10-CM

## 2020-12-27 ENCOUNTER — HEALTH MAINTENANCE LETTER (OUTPATIENT)
Age: 30
End: 2020-12-27

## 2021-04-24 ENCOUNTER — HEALTH MAINTENANCE LETTER (OUTPATIENT)
Age: 31
End: 2021-04-24

## 2021-06-03 VITALS
OXYGEN SATURATION: 97 % | WEIGHT: 201.3 LBS | HEIGHT: 65 IN | BODY MASS INDEX: 33.54 KG/M2 | HEART RATE: 108 BPM | SYSTOLIC BLOOD PRESSURE: 140 MMHG | TEMPERATURE: 97.5 F | DIASTOLIC BLOOD PRESSURE: 93 MMHG

## 2021-06-04 NOTE — PROGRESS NOTES
Assessment:       1. Viral URI       Medical Decision Making  Patient presents with cough and sinus congestion most consistent with a viral upper respiratory infection.  The previously was tested for strep and influenza that were both negative.  Patient was concerned today as she was coughing up thick brown/green mucus.  She does have a history of smoking, and feel that the mucus could be related to cigarette smoke.  She otherwise appears in no respiratory distress, has clear lung auscultation on exam, and good oxygen saturation 97% on room air.  Patient has no specific point tenderness over the sinuses to make bacterial sinusitis unlikely.      Plan:       Continue drain plenty fluids and allow for appropriate rest.  Honey and Vicks VapoRub as needed for cough.  Discussed use of nasal steroids and Sudafed if needed.  Discussed signs of worsening symptoms and when to follow-up with PCP if no symptom improvement.      Patient Instructions   You were seen today for sinus congestion and/or pain. This is likely due to a viral illness.    Symptoms management:  - May use Tylenol or Ibuprofen for discomfort and/or fever if present  - May try saline irrigation to relieve congestion (see instructions below)  - Use of nasal steroids (Flonase) as prescribed  - If you are experiencing ear fullness, may try an oral decongestant such as sudafed    Reasons to come back for re-evaluation:  - Develop a fever of 100.4F or current fever worsens  - Sudden and severe pain in the face and head  - Troubles seeing or double vision  - Swelling or redness around one or both eyes  - Sfiff neck  - Symptoms have not improved after 7 days    Buffered normal saline nasal irrigation   The benefits   1. Saline (saltwater) washes the mucus and irritants from your nose.   2. The sinus passages are moisturized.   3. Studies have also shown that a nasal irrigation improves cell function (the cells that move the mucus work better).   The recipe   Use  a one-quart glass jar that is thoroughly cleansed.   You may use a large medical syringe (30 cc), water pick with an irrigation tip (preferred method), squeeze bottle, or Neti pot. Do not use a baby bulb syringe. The syringe or pick should be sterilized frequently or replaced every two to three weeks to avoid contamination and infection.   Fill with water that has been distilled, previously boiled, or otherwise sterilized. Plain tap water is not recommended, because it is not necessarily sterile.   Add 1 to 1  heaping teaspoons of pickling/frank salt. Do not use table salt, because it contains a large number of additives.   Add 1 teaspoon of baking soda (pure bicarbonate).   Mix ingredients together, and store at room temperature. Discard after one week.   You may also make up a solution from premixed packets that are commercially prepared specifically for nasal irrigation.   The instructions   Irrigate your nose with saline one to two times per day.   If you have been told to use nasal medication, you should always use your saline solution first. The nasal medication is much more effective when sprayed onto clean nasal membranes, and the spray will reach deeper into the nose.   Pour the amount of fluid you plan to use into a clean bowl. Do not put your used syringe back into the storage container, because it contaminates your solution.   You may warm the solution slightly in the microwave, but be sure that the solution is not hot.   Bend over the sink (some people do this in the shower) and squirt the solution into each side of your nose, aiming the stream toward the back of your head, not the top of your head. The solution should flow into one nostril and out of the other, but it will not harm you if you swallow a little.   Some people experience a little burning sensation the first few times they use buffered saline solution, but this usually goes away after they adapt to it.     Cough    You were seen today  for a cough. This is likely due to a virus and will improve over the next 1-2 weeks on its own.    Symptom management:  - Drink plenty of non-caffeinated fluids  - Avoid smoke exposure  - May use tylenol or ibuprofen for discomfort  - Drink a warm non-caffeinated tea with honey  - Place a warm humidifier in your bedroom at night  - Heber's VaporRub  - If prescribed, use Tessalon Perles to help suppress the cough    Reasons to return for re-evaluation:  - Develop a fever 100.4 or higher, current fever worsens, or fever does not improve in 72 hours  - Difficulty breathing or shortness of breath  - Cough continues to worsen including coughing up blood or coughing up thick, colored phlegm  - Unable to tolerate fluids    Otherwise, if symptoms have not improved in 7 days, follow-up with your primary care provider.      Subjective:       Nhung THOMPSON is a 29 y.o. female here for evaluation of cough.  Onset of symptoms was 6 days ago with no improvement.  Patient notes green/brown mucus with the cough.  She otherwise has no fevers and no shortness of breath.  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    The following portions of the patient's history were reviewed and updated as appropriate: allergies, current medications and problem list.    Review of Systems  Pertinent items are noted in HPI.     Allergies  Allergies   Allergen Reactions     Bupropion Other (See Comments)     History of seizure activity while treated with Wellbutrin       Varenicline Other (See Comments)     Other reaction(s): Nightmares       Sulfa (Sulfonamide Antibiotics) Rash       No family history on file.    Social History     Socioeconomic History     Marital status: Single     Spouse name: None     Number of children: None     Years of education: None     Highest education level: None   Occupational History     None   Social Needs     Financial resource strain: None  "    Food insecurity:     Worry: None     Inability: None     Transportation needs:     Medical: None     Non-medical: None   Tobacco Use     Smoking status: Former Smoker     Smokeless tobacco: Never Used   Substance and Sexual Activity     Alcohol use: None     Drug use: None     Sexual activity: None   Lifestyle     Physical activity:     Days per week: None     Minutes per session: None     Stress: None   Relationships     Social connections:     Talks on phone: None     Gets together: None     Attends Muslim service: None     Active member of club or organization: None     Attends meetings of clubs or organizations: None     Relationship status: None     Intimate partner violence:     Fear of current or ex partner: None     Emotionally abused: None     Physically abused: None     Forced sexual activity: None   Other Topics Concern     None   Social History Narrative     None         Objective:       BP (!) 140/93   Pulse (!) 108   Temp 97.5  F (36.4  C)   Ht 5' 5\" (1.651 m)   Wt 201 lb 4.8 oz (91.3 kg)   LMP 12/23/2019 (Approximate)   SpO2 97%   BMI 33.50 kg/m    General appearance: alert, appears stated age, cooperative, no distress and non-toxic  Head: Normocephalic, without obvious abnormality, atraumatic, sinuses nontender to percussion  Ears: normal TM's and external ear canals both ears  Nose: no discharge  Throat: lips, mucosa, and tongue normal; teeth and gums normal  Neck: no adenopathy and supple, symmetrical, trachea midline  Lungs: clear to auscultation bilaterally  Heart: regular rate and rhythm, S1, S2 normal, no murmur, click, rub or gallop      "

## 2021-06-04 NOTE — PATIENT INSTRUCTIONS - HE
You were seen today for sinus congestion and/or pain. This is likely due to a viral illness.    Symptoms management:  - May use Tylenol or Ibuprofen for discomfort and/or fever if present  - May try saline irrigation to relieve congestion (see instructions below)  - Use of nasal steroids (Flonase) as prescribed  - If you are experiencing ear fullness, may try an oral decongestant such as sudafed    Reasons to come back for re-evaluation:  - Develop a fever of 100.4F or current fever worsens  - Sudden and severe pain in the face and head  - Troubles seeing or double vision  - Swelling or redness around one or both eyes  - Sfiff neck  - Symptoms have not improved after 7 days    Buffered normal saline nasal irrigation   The benefits   1. Saline (saltwater) washes the mucus and irritants from your nose.   2. The sinus passages are moisturized.   3. Studies have also shown that a nasal irrigation improves cell function (the cells that move the mucus work better).   The recipe   Use a one-quart glass jar that is thoroughly cleansed.   You may use a large medical syringe (30 cc), water pick with an irrigation tip (preferred method), squeeze bottle, or Neti pot. Do not use a baby bulb syringe. The syringe or pick should be sterilized frequently or replaced every two to three weeks to avoid contamination and infection.   Fill with water that has been distilled, previously boiled, or otherwise sterilized. Plain tap water is not recommended, because it is not necessarily sterile.   Add 1 to 1  heaping teaspoons of pickling/frank salt. Do not use table salt, because it contains a large number of additives.   Add 1 teaspoon of baking soda (pure bicarbonate).   Mix ingredients together, and store at room temperature. Discard after one week.   You may also make up a solution from premixed packets that are commercially prepared specifically for nasal irrigation.   The instructions   Irrigate your nose with saline one to two  times per day.   If you have been told to use nasal medication, you should always use your saline solution first. The nasal medication is much more effective when sprayed onto clean nasal membranes, and the spray will reach deeper into the nose.   Pour the amount of fluid you plan to use into a clean bowl. Do not put your used syringe back into the storage container, because it contaminates your solution.   You may warm the solution slightly in the microwave, but be sure that the solution is not hot.   Bend over the sink (some people do this in the shower) and squirt the solution into each side of your nose, aiming the stream toward the back of your head, not the top of your head. The solution should flow into one nostril and out of the other, but it will not harm you if you swallow a little.   Some people experience a little burning sensation the first few times they use buffered saline solution, but this usually goes away after they adapt to it.     Cough    You were seen today for a cough. This is likely due to a virus and will improve over the next 1-2 weeks on its own.    Symptom management:  - Drink plenty of non-caffeinated fluids  - Avoid smoke exposure  - May use tylenol or ibuprofen for discomfort  - Drink a warm non-caffeinated tea with honey  - Place a warm humidifier in your bedroom at night  - Heber's VaporRub  - If prescribed, use Tessalon Perles to help suppress the cough    Reasons to return for re-evaluation:  - Develop a fever 100.4 or higher, current fever worsens, or fever does not improve in 72 hours  - Difficulty breathing or shortness of breath  - Cough continues to worsen including coughing up blood or coughing up thick, colored phlegm  - Unable to tolerate fluids    Otherwise, if symptoms have not improved in 7 days, follow-up with your primary care provider.

## 2021-10-09 ENCOUNTER — HEALTH MAINTENANCE LETTER (OUTPATIENT)
Age: 31
End: 2021-10-09

## 2022-05-16 ENCOUNTER — HEALTH MAINTENANCE LETTER (OUTPATIENT)
Age: 32
End: 2022-05-16

## 2022-09-11 ENCOUNTER — HEALTH MAINTENANCE LETTER (OUTPATIENT)
Age: 32
End: 2022-09-11

## 2023-02-22 ENCOUNTER — E-VISIT (OUTPATIENT)
Dept: FAMILY MEDICINE | Facility: CLINIC | Age: 33
End: 2023-02-22
Payer: COMMERCIAL

## 2023-02-22 DIAGNOSIS — J01.90 ACUTE SINUSITIS WITH SYMPTOMS > 10 DAYS: ICD-10-CM

## 2023-02-22 DIAGNOSIS — R06.02 SHORTNESS OF BREATH: Primary | ICD-10-CM

## 2023-02-22 DIAGNOSIS — R50.81 FEVER IN OTHER DISEASES: ICD-10-CM

## 2023-02-22 PROCEDURE — 99421 OL DIG E/M SVC 5-10 MIN: CPT | Performed by: NURSE PRACTITIONER

## 2023-02-22 NOTE — PATIENT INSTRUCTIONS
Sinusitis (Antibiotic Treatment)    The sinuses are air-filled spaces within the bones of the face. They connect to the inside of the nose. Sinusitis is an inflammation of the tissue that lines the sinuses. Sinusitis can occur during a cold. It can also happen due to allergies to pollens and other particles in the air. Sinusitis can cause symptoms of sinus congestion and a feeling of fullness. A sinus infection causes fever, headache, and facial pain. There is often green or yellow fluid draining from the nose or into the back of the throat (post-nasal drip). You have been given antibiotics to treat this condition.   Home care    Take the full course of antibiotics as instructed. Don't stop taking them, even when you feel better.    Drink plenty of water, hot tea, and other liquids as directed by the healthcare provider. This may help thin nasal mucus. It also may help your sinuses drain fluids.    Heat may help soothe painful areas of your face. Use a towel soaked in hot water. Or,  the shower and direct the warm spray onto your face. Using a vaporizer along with a menthol rub at night may also help soothe symptoms.     An expectorant with guaifenesin may help thin nasal mucus and help your sinuses drain fluids. Talk with your provider or pharmacists before taking an over-the-counter (OTC) medicine if you have any questions about it or its side effects..    You can use an OTC decongestant, unless a similar medicine was prescribed to you. Nasal sprays work the fastest. Use one that contains phenylephrine or oxymetazoline. First blow your nose gently. Then use the spray. Don't use these medicines more often than directed on the label. If you do, your symptoms may get worse. You may also take pills that contain pseudoephedrine. Don t use products that combine multiple medicines. This is because side effects may be increased. Read labels. You can also ask the pharmacist for help. (People with high blood  pressure should not use decongestants. They can raise blood pressure.) Talk with your provider or pharmacist if you have any questions about the medicine..    OTC antihistamines may help if allergies contributed to your sinusitis. Talk with your provider or pharmacist if you have any questions about the medicine..    Don't use nasal rinses or irrigation during an acute sinus infection, unless your healthcare provider tells you to. Rinsing may spread the infection to other areas in your sinuses.    Use acetaminophen or ibuprofen to control pain, unless another pain medicine was prescribed to you. If you have chronic liver or kidney disease or ever had a stomach ulcer, talk with your healthcare provider before using these medicines. Never give aspirin to anyone under age 18 who is ill with a fever. It may cause severe liver damage.    Don't smoke. This can make symptoms worse.    Follow-up care  Follow up with your healthcare provider, or as advised.   When to seek medical advice  Call your healthcare provider if any of these occur:     Facial pain or headache that gets worse    Stiff neck    Unusual drowsiness or confusion    Swelling of your forehead or eyelids    Symptoms don't go away in 10 days    Vision problems, such as blurred or double vision    Fever of 100.4 F (38 C) or higher, or as directed by your healthcare provider  Call 911  Call 911 if any of these occur:     Seizure    Trouble breathing    Feeling dizzy or faint    Fingernails, skin or lips look blue, purple , or gray  Prevention  Here are steps you can take to help prevent an infection:     Keep good hand washing habits.    Don t have close contact with people who have sore throats, colds, or other upper respiratory infections.    Don t smoke, and stay away from secondhand smoke.    Stay up to date with of your vaccines.  [employer] last reviewed this educational content on 12/1/2019 2000-2021 [employer]. All rights reserved. This  information is not intended as a substitute for professional medical care. Always follow your healthcare professional's instructions.        Dear Nhung Concepcion    After reviewing your responses, I've been able to diagnose you with    Shortness of breath  Fever in other diseases  Acute sinusitis with symptoms > 10 days.      Based on your responses and diagnosis, I have prescribed No orders of the defined types were placed in this encounter.   to treat your symptoms. I have sent this to your pharmacy.?     It is also important to stay well hydrated, get lots of rest and take over-the-counter decongestants,?tylenol?or ibuprofen if you?are able to?take those medications per your primary care provider to help relieve discomfort.?     It is important that you take?all of?your prescribed medication even if your symptoms are improving after a few doses.? Taking?all of?your medicine helps prevent the symptoms from returning.?     If your symptoms worsen, you develop severe headache, vomiting, high fever (>102), or are not improving in 7 days, please contact your primary care provider for an appointment or visit any of our convenient Walk-in Care or Urgent Care Centers to be seen which can be found on our website?here.?     Thanks again for choosing?us?as your health care partner,?   ?  Serene Lyle, YEN CNP?

## 2023-02-27 ENCOUNTER — OFFICE VISIT (OUTPATIENT)
Dept: FAMILY MEDICINE | Facility: CLINIC | Age: 33
End: 2023-02-27
Payer: COMMERCIAL

## 2023-02-27 VITALS
SYSTOLIC BLOOD PRESSURE: 132 MMHG | DIASTOLIC BLOOD PRESSURE: 84 MMHG | HEART RATE: 105 BPM | OXYGEN SATURATION: 98 % | TEMPERATURE: 98.8 F | RESPIRATION RATE: 18 BRPM

## 2023-02-27 DIAGNOSIS — R10.11 ABDOMINAL PAIN, RIGHT UPPER QUADRANT: ICD-10-CM

## 2023-02-27 DIAGNOSIS — R82.90 CLOUDY URINE: ICD-10-CM

## 2023-02-27 DIAGNOSIS — E28.2 PCOS (POLYCYSTIC OVARIAN SYNDROME): ICD-10-CM

## 2023-02-27 DIAGNOSIS — R63.5 WEIGHT GAIN: ICD-10-CM

## 2023-02-27 DIAGNOSIS — N89.8 VAGINAL DISCHARGE: ICD-10-CM

## 2023-02-27 DIAGNOSIS — R06.02 SOB (SHORTNESS OF BREATH): ICD-10-CM

## 2023-02-27 DIAGNOSIS — F33.1 MODERATE EPISODE OF RECURRENT MAJOR DEPRESSIVE DISORDER (H): ICD-10-CM

## 2023-02-27 DIAGNOSIS — E66.01 MORBID OBESITY (H): Primary | ICD-10-CM

## 2023-02-27 PROBLEM — R73.03 PREDIABETES: Status: ACTIVE | Noted: 2022-07-13

## 2023-02-27 PROBLEM — F33.9 MAJOR DEPRESSION, RECURRENT (H): Status: ACTIVE | Noted: 2023-02-27

## 2023-02-27 LAB
ALBUMIN UR-MCNC: NEGATIVE MG/DL
APPEARANCE UR: CLEAR
BACTERIA #/AREA URNS HPF: ABNORMAL /HPF
BASOPHILS # BLD AUTO: 0 10E3/UL (ref 0–0.2)
BASOPHILS NFR BLD AUTO: 0 %
BILIRUB UR QL STRIP: NEGATIVE
CLUE CELLS: ABNORMAL
COLOR UR AUTO: YELLOW
EOSINOPHIL # BLD AUTO: 0.3 10E3/UL (ref 0–0.7)
EOSINOPHIL NFR BLD AUTO: 3 %
ERYTHROCYTE [DISTWIDTH] IN BLOOD BY AUTOMATED COUNT: 14.2 % (ref 10–15)
GLUCOSE UR STRIP-MCNC: NEGATIVE MG/DL
HBA1C MFR BLD: 6.3 % (ref 0–5.6)
HCT VFR BLD AUTO: 35.9 % (ref 35–47)
HGB BLD-MCNC: 12.2 G/DL (ref 11.7–15.7)
HGB UR QL STRIP: ABNORMAL
IMM GRANULOCYTES # BLD: 0 10E3/UL
IMM GRANULOCYTES NFR BLD: 0 %
KETONES UR STRIP-MCNC: NEGATIVE MG/DL
LEUKOCYTE ESTERASE UR QL STRIP: NEGATIVE
LYMPHOCYTES # BLD AUTO: 2.8 10E3/UL (ref 0.8–5.3)
LYMPHOCYTES NFR BLD AUTO: 28 %
MCH RBC QN AUTO: 31.1 PG (ref 26.5–33)
MCHC RBC AUTO-ENTMCNC: 34 G/DL (ref 31.5–36.5)
MCV RBC AUTO: 92 FL (ref 78–100)
MONOCYTES # BLD AUTO: 0.7 10E3/UL (ref 0–1.3)
MONOCYTES NFR BLD AUTO: 7 %
NEUTROPHILS # BLD AUTO: 6.1 10E3/UL (ref 1.6–8.3)
NEUTROPHILS NFR BLD AUTO: 62 %
NITRATE UR QL: NEGATIVE
PH UR STRIP: 5.5 [PH] (ref 5–8)
PLATELET # BLD AUTO: 355 10E3/UL (ref 150–450)
RBC # BLD AUTO: 3.92 10E6/UL (ref 3.8–5.2)
RBC #/AREA URNS AUTO: ABNORMAL /HPF
SP GR UR STRIP: 1.02 (ref 1–1.03)
SQUAMOUS #/AREA URNS AUTO: ABNORMAL /LPF
TRICHOMONAS, WET PREP: ABNORMAL
UROBILINOGEN UR STRIP-ACNC: 0.2 E.U./DL
WBC # BLD AUTO: 9.9 10E3/UL (ref 4–11)
WBC #/AREA URNS AUTO: ABNORMAL /HPF
WBC'S/HIGH POWER FIELD, WET PREP: ABNORMAL
YEAST, WET PREP: ABNORMAL

## 2023-02-27 PROCEDURE — 36415 COLL VENOUS BLD VENIPUNCTURE: CPT | Performed by: NURSE PRACTITIONER

## 2023-02-27 PROCEDURE — 87210 SMEAR WET MOUNT SALINE/INK: CPT | Performed by: NURSE PRACTITIONER

## 2023-02-27 PROCEDURE — 83036 HEMOGLOBIN GLYCOSYLATED A1C: CPT | Performed by: NURSE PRACTITIONER

## 2023-02-27 PROCEDURE — 81001 URINALYSIS AUTO W/SCOPE: CPT | Performed by: NURSE PRACTITIONER

## 2023-02-27 PROCEDURE — 99214 OFFICE O/P EST MOD 30 MIN: CPT | Performed by: NURSE PRACTITIONER

## 2023-02-27 PROCEDURE — 80050 GENERAL HEALTH PANEL: CPT | Performed by: NURSE PRACTITIONER

## 2023-02-27 PROCEDURE — 80061 LIPID PANEL: CPT | Performed by: NURSE PRACTITIONER

## 2023-02-27 ASSESSMENT — PATIENT HEALTH QUESTIONNAIRE - PHQ9
SUM OF ALL RESPONSES TO PHQ QUESTIONS 1-9: 12
10. IF YOU CHECKED OFF ANY PROBLEMS, HOW DIFFICULT HAVE THESE PROBLEMS MADE IT FOR YOU TO DO YOUR WORK, TAKE CARE OF THINGS AT HOME, OR GET ALONG WITH OTHER PEOPLE: SOMEWHAT DIFFICULT
SUM OF ALL RESPONSES TO PHQ QUESTIONS 1-9: 12

## 2023-02-27 ASSESSMENT — ANXIETY QUESTIONNAIRES
7. FEELING AFRAID AS IF SOMETHING AWFUL MIGHT HAPPEN: SEVERAL DAYS
6. BECOMING EASILY ANNOYED OR IRRITABLE: MORE THAN HALF THE DAYS
GAD7 TOTAL SCORE: 5
2. NOT BEING ABLE TO STOP OR CONTROL WORRYING: NOT AT ALL
4. TROUBLE RELAXING: SEVERAL DAYS
IF YOU CHECKED OFF ANY PROBLEMS ON THIS QUESTIONNAIRE, HOW DIFFICULT HAVE THESE PROBLEMS MADE IT FOR YOU TO DO YOUR WORK, TAKE CARE OF THINGS AT HOME, OR GET ALONG WITH OTHER PEOPLE: SOMEWHAT DIFFICULT
5. BEING SO RESTLESS THAT IT IS HARD TO SIT STILL: NOT AT ALL
1. FEELING NERVOUS, ANXIOUS, OR ON EDGE: SEVERAL DAYS
7. FEELING AFRAID AS IF SOMETHING AWFUL MIGHT HAPPEN: SEVERAL DAYS
3. WORRYING TOO MUCH ABOUT DIFFERENT THINGS: NOT AT ALL
GAD7 TOTAL SCORE: 5
GAD7 TOTAL SCORE: 5
8. IF YOU CHECKED OFF ANY PROBLEMS, HOW DIFFICULT HAVE THESE MADE IT FOR YOU TO DO YOUR WORK, TAKE CARE OF THINGS AT HOME, OR GET ALONG WITH OTHER PEOPLE?: SOMEWHAT DIFFICULT

## 2023-02-27 NOTE — PROGRESS NOTES
Assessment & Plan     Morbid obesity (H)  Patient has been gaining weight about 35 pounds in 1 year, also having increased sweating  -Discussed that weight gain may be contributing to PCOS, and lack of exercise  We will check TSH today  Consider Ozempic or other GLP-1 inhibitor if diagnosis of diabetes    Moderate episode of recurrent major depressive disorder (H)  Continue follow-up with psychiatrist  Patient previously seen by therapist but not at this time I do recommend continuing therapy  Continue on medication  Expressed my concern with worsening depression  Applauded efforts for abstinence for alcohol and smoking    SOB (shortness of breath)  Given recent history of chronic bronchitis with chronic cough as well as shortness of breath with minimal exercise we will do pulmonary function testing   patient agrees to this  - General PFT Lab (Please always keep checked)  - Pulmonary Function Test    Weight gain  C morbid obesity  - TSH with free T4 reflex  - TSH with free T4 reflex    Vaginal discharge  Patient with smelly vaginal discharge without history of penetrative sex or sexual activity  Checking wet prep to rule out BV, yeast infection  - Wet prep - Clinic Collect    Abdominal pain, right upper quadrant  Patient with a history of cholecystectomy 1 year ago, history of alcoholism  Checking comprehensive metabolic panel today  Patient to schedule follow-up  - Comprehensive metabolic panel (BMP + Alb, Alk Phos, ALT, AST, Total. Bili, TP)  - Comprehensive metabolic panel (BMP + Alb, Alk Phos, ALT, AST, Total. Bili, TP)    PCOS (polycystic ovarian syndrome)  Discussed options for spironolactone/oral contraceptive pill for androgen blockade.  OCP may not be the best choice given risk factors for thrombosis  Patient does not want to start spironolactone at this time.  Patient did have palpitations while on that medication though not clear what dose she was given  Discussed possible gender dysphoria today.  -Patient would like to discuss more and do more research.  Checking common comorbidities today  - Hemoglobin A1c  - Lipid Profile (Chol, Trig, HDL, LDL calc)  - CBC with platelets and differential  - Hemoglobin A1c  - Lipid Profile (Chol, Trig, HDL, LDL calc)  - CBC with platelets and differential    Cloudy urine  Cloudy urine likely secondary to vaginal discharge we will rule out labs for possible UTI  - UA Macro with Reflex to Micro and Culture - lab collect  - UA Macro with Reflex to Micro and Culture - lab collect  - Urine Microscopic      Depression Screening Follow Up    PHQ 2/27/2023   PHQ-9 Total Score 12   Q9: Thoughts of better off dead/self-harm past 2 weeks Several days   F/U: Thoughts of suicide or self-harm Yes   F/U: Self harm-plan No   F/U: Self-harm action No   F/U: Safety concerns No     Last PHQ-9 2/27/2023   1.  Little interest or pleasure in doing things 2   2.  Feeling down, depressed, or hopeless 1   3.  Trouble falling or staying asleep, or sleeping too much 2   4.  Feeling tired or having little energy 2   5.  Poor appetite or overeating 2   6.  Feeling bad about yourself 1   7.  Trouble concentrating 0   8.  Moving slowly or restless 1   Q9: Thoughts of better off dead/self-harm past 2 weeks 1   PHQ-9 Total Score 12   Difficulty at work, home, or with people -   In the past two weeks have you had thoughts of suicide or self harm? Yes   Do you have concerns about your personal safety or the safety of others? No   In the past 2 weeks have you thought about a plan or had intention to harm yourself? No   In the past 2 weeks have you acted on these thoughts in any way? No               Follow Up      Follow Up Actions Taken  Crisis resource information provided in the After Visit Summary  Patient to follow up with PCP.  Clinic staff to schedule appointment if able.  Patient declined referral.    Discussed the following ways the patient can remain in a safe environment:  remove alcohol, remove  drugs and be around others  Work on weight loss  Regular exercise    No follow-ups on file.    YEN Capellan CNP  [facility]    Mee Kelly is a 33 year old, presenting for the following health issues:  Establish Care (Here to establish care -previous patient. )      History of Present Illness       Mental Health Follow-up:  Patient presents to follow-up on Depression & Anxiety.Patient's depression since last visit has been:  Medium  The patient is not having other symptoms associated with depression.  Patient's anxiety since last visit has been:  Medium  The patient is not having other symptoms associated with anxiety.  Any significant life events: No  Patient is feeling anxious or having panic attacks.  Patient has no concerns about alcohol or drug use.    She eats 0-1 servings of fruits and vegetables daily.She consumes 8 sweetened beverage(s) daily.She exercises with enough effort to increase her heart rate 9 or less minutes per day.  She exercises with enough effort to increase her heart rate 3 or less days per week.   She is taking medications regularly.    Today's PHQ-9         PHQ-9 Total Score: 12    PHQ-9 Q9 Thoughts of better off dead/self-harm past 2 weeks :   Several days  Thoughts of suicide or self harm: (P) Yes  Self-harm Plan:   (P) No  Self-harm Action:     (P) No  Safety concerns for self or others: (P) No    How difficult have these problems made it for you to do your work, take care of things at home, or get along with other people: Somewhat difficult  Today's CHANDNI-7 Score: 5               #Urinating - dark and cloudy, sometimes has pain, lower back.     #smoking - stopped chantix - (SI on this in November) depression worsened chantix, stopped 6 weeks.     #depression - psychiatrist seeing regularly - stopped therapy, still sober 6 months. AA meetings but not participating - feels she is in a decline.  Cymbalta 120mg daily      #SOB easily - worse with  activity - walking every small distance, sweating     #weight gain - gaining steadily     # lower back pain -bilateral, paraspinal    #clear discharge - hx of ASCUS - with colpo and leep, due for a PAP - 2016, clear smelly discharge     Review of Systems   Constitutional, HEENT, cardiovascular, pulmonary, gi and gu systems are negative, except as otherwise noted.      Objective    /84 (BP Location: Left arm, Patient Position: Sitting, Cuff Size: Adult Large)   Pulse 105   Temp 98.8  F (37.1  C)   Resp 18   LMP 01/30/2023 (Approximate)   SpO2 98%   There is no height or weight on file to calculate BMI.  Physical Exam   GENERAL: healthy, alert and no distress  NECK: no adenopathy, no asymmetry, masses, or scars and thyroid normal to palpation  RESP: lungs clear to auscultation - no rales, rhonchi or wheezes  CV: Increased rate and regular rhythm, normal S1 S2, no S3 or S4, no murmur, click or rub, no peripheral edema and peripheral pulses strong  ABDOMEN: Adiposity, right upper quadrant tenderness epigastric tenderness, no hepatosplenomegaly   MS: no gross musculoskeletal defects noted, no edema, patient indicates paraspinal muscle tenderness, no scoliosis  SKIN: no suspicious lesions or rashes,.  hirsutism facial  NEURO: Normal strength and tone, mentation intact and speech normal  PSYCH: mentation appears normal, affect normal/bright

## 2023-02-28 LAB
ALBUMIN SERPL BCG-MCNC: 4.4 G/DL (ref 3.5–5.2)
ALP SERPL-CCNC: 129 U/L (ref 35–104)
ALT SERPL W P-5'-P-CCNC: 122 U/L (ref 10–35)
ANION GAP SERPL CALCULATED.3IONS-SCNC: 14 MMOL/L (ref 7–15)
AST SERPL W P-5'-P-CCNC: 35 U/L (ref 10–35)
BILIRUB SERPL-MCNC: <0.2 MG/DL
BUN SERPL-MCNC: 11.9 MG/DL (ref 6–20)
CALCIUM SERPL-MCNC: 9.6 MG/DL (ref 8.6–10)
CHLORIDE SERPL-SCNC: 105 MMOL/L (ref 98–107)
CHOLEST SERPL-MCNC: 233 MG/DL
CREAT SERPL-MCNC: 0.81 MG/DL (ref 0.51–0.95)
DEPRECATED HCO3 PLAS-SCNC: 21 MMOL/L (ref 22–29)
GFR SERPL CREATININE-BSD FRML MDRD: >90 ML/MIN/1.73M2
GLUCOSE SERPL-MCNC: 112 MG/DL (ref 70–99)
HDLC SERPL-MCNC: 43 MG/DL
LDLC SERPL CALC-MCNC: 121 MG/DL
NONHDLC SERPL-MCNC: 190 MG/DL
POTASSIUM SERPL-SCNC: 4.2 MMOL/L (ref 3.4–5.3)
PROT SERPL-MCNC: 7.1 G/DL (ref 6.4–8.3)
SODIUM SERPL-SCNC: 140 MMOL/L (ref 136–145)
TRIGL SERPL-MCNC: 345 MG/DL
TSH SERPL DL<=0.005 MIU/L-ACNC: 2.84 UIU/ML (ref 0.3–4.2)

## 2023-02-28 NOTE — PATIENT INSTRUCTIONS
[facility] offers various levels of support. Call them at: [phone]    Please call the following crisis line if you are experiencing a mental health emergency: National Suicide Prevention Hotline: 1-367.824.6070    Please call the following crisis line if you are experiencing a mental health emergency: Crisis Connection [phone]    Text HOME to 906479 to connect with a Crisis Counselor  Free 24/7 support at your fingertips or via Facebook  For more information go to [email or web address]    [facility],   [address] [place] [address]  [phone] ext [phone]  [phone] Fax    Psychiatry and Psychotherapy Services:  [facility] - [facility]iatric Services through [facility] [phone]  You can go to [facility] 24/7 to see a psychiatrist as they always have a psychiatrist available. The wait times are shorter in the morning as there are more staff members available. If you do not have insurance you can ask to see a financial counselor to help with benefits, etc.

## 2023-03-02 NOTE — RESULT ENCOUNTER NOTE
Urine: Few epithelial cells seen in the urine this is likely due to a dirty sample.  He also had a small amount of blood in the urine.  Sometimes this can happen with small kidney stones.  If your lower back pain worsens or you have pain with urination we can consider doing a CT scan to check for stones.  We can also recheck of the UA in a couple weeks to see if the blood has resolved.     Wet prep: White blood cells were seen but not very many.  This is sometimes results of vaginitis.  Or vaginal irritation/dryness -not the result of an infection though.    CMP: Overall your carbon dioxide is low which is not very remarkable when looking at the full picture today.  If you had chronic hypoxemia I would be concerned about the carbon dioxide being high.  Your glucose is elevated which is likely secondary to the prediabetes and not being fasting.  -Your alkaline phosphatase and your ALT are high.  The alkaline phosphatase could be related to your medications that you are taking.  Alternatively we could recheck this when you are fasting and hydrated.  If it still elevated then I would recommend a referral to GI to look into the abdominal pain that you are feeling in relation to the elevated alk phos.    ALT-this is one of your liver markers.  It is elevated.  About 3 times the upper limit.  So its not dangerous but I would avoid Tylenol and alcohol as much as you can.    Your cholesterol testing did show mild elevations in your total cholesterol and LDL, which are the harmful types of cholesterol.     Cholesterol levels can be reduced with lifestyle modifications to lower cardiovascular disease risk. This includes eating a heart-healthy diet with emphasis on plant forward vegetables, fruits & whole grains, regular aerobic exercises (30min-1hr daily), maintenance of desirable body weight and avoidance of tobacco products.     A1c -Your screen for diabetes does show some impaired glucose tolerance that we call  prediabetes. We determine this is the case when your hemoglobin A1c (average measure of your glucose levels from the past 3 months) is between 5.7% and 6.4%. Your hemoglobin A1c value was 6.3%. We can continue to monitor this yearly. To prevent progression to diabetes, you can continue to increase physical activity to 150min per week or more, increase plant forward foods such as fresh fruits/veggies, as well as limit empty carbs and processed foods by switching to whole grain options.  If you would like to meet with a dietician, we can help place this referral.  Additionally if you are interested in starting a medication, metformin has been shown to help prevent progression to diabetes. Please schedule a visit with us if you have any questions about this, or if you would like to start the medication.    Your CBC (complete blood count) which looks at your hemoglobin and other blood cell types looks good- no concerns for anemia or infection.  You do not have any acute or chronic infection at this time      Please let me know if you have any questions or concerns.    Thank you for trusting us with your care. It was a pleasure seeing you.  YEN Capellan CNP on 3/2/2023 at 7:17 AM

## 2023-06-03 ENCOUNTER — HEALTH MAINTENANCE LETTER (OUTPATIENT)
Age: 33
End: 2023-06-03

## 2023-07-04 ENCOUNTER — TELEPHONE (OUTPATIENT)
Dept: FAMILY MEDICINE | Facility: CLINIC | Age: 33
End: 2023-07-04
Payer: COMMERCIAL

## 2023-07-04 DIAGNOSIS — R11.0 NAUSEA: Primary | ICD-10-CM

## 2023-07-04 DIAGNOSIS — E66.01 MORBID OBESITY (H): ICD-10-CM

## 2023-07-04 RX ORDER — ONDANSETRON 4 MG/1
4 TABLET, FILM COATED ORAL EVERY 6 HOURS PRN
Qty: 60 TABLET | Refills: 1 | Status: SHIPPED | OUTPATIENT
Start: 2023-07-04 | End: 2023-08-03

## 2023-09-20 ENCOUNTER — MYC MEDICAL ADVICE (OUTPATIENT)
Dept: FAMILY MEDICINE | Facility: CLINIC | Age: 33
End: 2023-09-20

## 2023-10-02 ENCOUNTER — MYC MEDICAL ADVICE (OUTPATIENT)
Dept: FAMILY MEDICINE | Facility: CLINIC | Age: 33
End: 2023-10-02
Payer: COMMERCIAL

## 2023-10-03 NOTE — PROGRESS NOTES
07/16/17 0039   Patient Belongings   Did you bring any home meds/supplements to the hospital?  No   Patient Belongings cell phone/electronics;clothing;plastic bag   Disposition of Belongings plastic bag c clothing,shoes and cell phone/tote c ,clothing   General Info Comment nothing to safe   breast milk ADMISSION:  I am responsible for any personal items that are not sent to the safe or pharmacy. [facility] is not responsible for loss, theft or damage of any property in my possession.    Patient Signature _____________________ Date/Time _____________________    Staff Signature _______________________ Date/Time _____________________    2nd Staff person, if patient is unable/unwilling to sign  ___________________________________ Date/Time _____________________  DISCHARGE:  All personal items have been returned to me.    Patient Signature _____________________ Date/Time _____________________    Staff Signature _______________________ Date/Time _____________________     See Infusion Order from 9-13-23   
br
CMP from 10/2/2023 drawn todaybr

## 2023-11-01 ENCOUNTER — MYC MEDICAL ADVICE (OUTPATIENT)
Dept: FAMILY MEDICINE | Facility: CLINIC | Age: 33
End: 2023-11-01

## 2023-11-01 ENCOUNTER — OFFICE VISIT (OUTPATIENT)
Dept: FAMILY MEDICINE | Facility: CLINIC | Age: 33
End: 2023-11-01
Payer: COMMERCIAL

## 2023-11-01 VITALS
WEIGHT: 220.8 LBS | TEMPERATURE: 99.3 F | RESPIRATION RATE: 20 BRPM | DIASTOLIC BLOOD PRESSURE: 80 MMHG | HEIGHT: 65 IN | HEART RATE: 107 BPM | BODY MASS INDEX: 36.79 KG/M2 | SYSTOLIC BLOOD PRESSURE: 118 MMHG | OXYGEN SATURATION: 98 %

## 2023-11-01 DIAGNOSIS — E66.01 MORBID OBESITY (H): ICD-10-CM

## 2023-11-01 DIAGNOSIS — R73.03 PREDIABETES: ICD-10-CM

## 2023-11-01 DIAGNOSIS — F17.200 NICOTINE DEPENDENCE, UNCOMPLICATED, UNSPECIFIED NICOTINE PRODUCT TYPE: ICD-10-CM

## 2023-11-01 DIAGNOSIS — R63.5 WEIGHT GAIN: ICD-10-CM

## 2023-11-01 DIAGNOSIS — R11.0 NAUSEA: Primary | ICD-10-CM

## 2023-11-01 DIAGNOSIS — R06.02 SHORTNESS OF BREATH: ICD-10-CM

## 2023-11-01 DIAGNOSIS — E78.2 MIXED HYPERLIPIDEMIA: ICD-10-CM

## 2023-11-01 DIAGNOSIS — N92.0 MENORRHAGIA WITH REGULAR CYCLE: ICD-10-CM

## 2023-11-01 DIAGNOSIS — F39 MOOD DISORDER (H): ICD-10-CM

## 2023-11-01 DIAGNOSIS — Z12.4 CERVICAL CANCER SCREENING: ICD-10-CM

## 2023-11-01 DIAGNOSIS — K21.00 GASTROESOPHAGEAL REFLUX DISEASE WITH ESOPHAGITIS WITHOUT HEMORRHAGE: ICD-10-CM

## 2023-11-01 DIAGNOSIS — N93.9 EPISODE OF HEAVY VAGINAL BLEEDING: Primary | ICD-10-CM

## 2023-11-01 LAB
ALBUMIN SERPL BCG-MCNC: 4.6 G/DL (ref 3.5–5.2)
ALP SERPL-CCNC: 81 U/L (ref 35–104)
ALT SERPL W P-5'-P-CCNC: 22 U/L (ref 0–50)
ANION GAP SERPL CALCULATED.3IONS-SCNC: 11 MMOL/L (ref 7–15)
AST SERPL W P-5'-P-CCNC: 24 U/L (ref 0–45)
BILIRUB SERPL-MCNC: 0.2 MG/DL
BUN SERPL-MCNC: 7 MG/DL (ref 6–20)
CALCIUM SERPL-MCNC: 9.3 MG/DL (ref 8.6–10)
CHLORIDE SERPL-SCNC: 104 MMOL/L (ref 98–107)
CHOLEST SERPL-MCNC: 190 MG/DL
CLUE CELLS: ABNORMAL
CREAT SERPL-MCNC: 0.85 MG/DL (ref 0.51–0.95)
DEPRECATED HCO3 PLAS-SCNC: 25 MMOL/L (ref 22–29)
EGFRCR SERPLBLD CKD-EPI 2021: >90 ML/MIN/1.73M2
ERYTHROCYTE [DISTWIDTH] IN BLOOD BY AUTOMATED COUNT: 14 % (ref 10–15)
GLUCOSE SERPL-MCNC: 95 MG/DL (ref 70–99)
HBA1C MFR BLD: 5.6 % (ref 0–5.6)
HCT VFR BLD AUTO: 39.2 % (ref 35–47)
HDLC SERPL-MCNC: 37 MG/DL
HGB BLD-MCNC: 13.3 G/DL (ref 11.7–15.7)
LDLC SERPL CALC-MCNC: 122 MG/DL
MCH RBC QN AUTO: 31.1 PG (ref 26.5–33)
MCHC RBC AUTO-ENTMCNC: 33.9 G/DL (ref 31.5–36.5)
MCV RBC AUTO: 92 FL (ref 78–100)
NONHDLC SERPL-MCNC: 153 MG/DL
PLATELET # BLD AUTO: 345 10E3/UL (ref 150–450)
POTASSIUM SERPL-SCNC: 3.9 MMOL/L (ref 3.4–5.3)
PROT SERPL-MCNC: 7.3 G/DL (ref 6.4–8.3)
RBC # BLD AUTO: 4.27 10E6/UL (ref 3.8–5.2)
SODIUM SERPL-SCNC: 140 MMOL/L (ref 135–145)
TRICHOMONAS, WET PREP: ABNORMAL
TRIGL SERPL-MCNC: 157 MG/DL
WBC # BLD AUTO: 10.4 10E3/UL (ref 4–11)
WBC'S/HIGH POWER FIELD, WET PREP: ABNORMAL
YEAST, WET PREP: ABNORMAL

## 2023-11-01 PROCEDURE — 90715 TDAP VACCINE 7 YRS/> IM: CPT | Performed by: NURSE PRACTITIONER

## 2023-11-01 PROCEDURE — 90682 RIV4 VACC RECOMBINANT DNA IM: CPT | Performed by: NURSE PRACTITIONER

## 2023-11-01 PROCEDURE — 90472 IMMUNIZATION ADMIN EACH ADD: CPT | Performed by: NURSE PRACTITIONER

## 2023-11-01 PROCEDURE — 80061 LIPID PANEL: CPT | Performed by: NURSE PRACTITIONER

## 2023-11-01 PROCEDURE — 87210 SMEAR WET MOUNT SALINE/INK: CPT | Performed by: NURSE PRACTITIONER

## 2023-11-01 PROCEDURE — 99406 BEHAV CHNG SMOKING 3-10 MIN: CPT | Mod: 25 | Performed by: NURSE PRACTITIONER

## 2023-11-01 PROCEDURE — 90677 PCV20 VACCINE IM: CPT | Performed by: NURSE PRACTITIONER

## 2023-11-01 PROCEDURE — 90471 IMMUNIZATION ADMIN: CPT | Performed by: NURSE PRACTITIONER

## 2023-11-01 PROCEDURE — 83036 HEMOGLOBIN GLYCOSYLATED A1C: CPT | Performed by: NURSE PRACTITIONER

## 2023-11-01 PROCEDURE — 85027 COMPLETE CBC AUTOMATED: CPT | Performed by: NURSE PRACTITIONER

## 2023-11-01 PROCEDURE — 87624 HPV HI-RISK TYP POOLED RSLT: CPT | Performed by: NURSE PRACTITIONER

## 2023-11-01 PROCEDURE — 80053 COMPREHEN METABOLIC PANEL: CPT | Performed by: NURSE PRACTITIONER

## 2023-11-01 PROCEDURE — 99214 OFFICE O/P EST MOD 30 MIN: CPT | Mod: 25 | Performed by: NURSE PRACTITIONER

## 2023-11-01 PROCEDURE — 36415 COLL VENOUS BLD VENIPUNCTURE: CPT | Performed by: NURSE PRACTITIONER

## 2023-11-01 PROCEDURE — G0145 SCR C/V CYTO,THINLAYER,RESCR: HCPCS | Performed by: NURSE PRACTITIONER

## 2023-11-01 RX ORDER — FAMOTIDINE 20 MG/1
20 TABLET, FILM COATED ORAL 2 TIMES DAILY PRN
Qty: 180 TABLET | Refills: 0 | Status: SHIPPED | OUTPATIENT
Start: 2023-11-01 | End: 2024-01-15

## 2023-11-01 RX ORDER — LAMOTRIGINE 100 MG/1
TABLET ORAL
COMMUNITY
Start: 2023-11-01

## 2023-11-01 RX ORDER — OMEPRAZOLE 40 MG/1
40 CAPSULE, DELAYED RELEASE ORAL DAILY
Qty: 14 CAPSULE | Refills: 0 | Status: SHIPPED | OUTPATIENT
Start: 2023-11-01 | End: 2024-01-15

## 2023-11-01 ASSESSMENT — PATIENT HEALTH QUESTIONNAIRE - PHQ9
SUM OF ALL RESPONSES TO PHQ QUESTIONS 1-9: 6
10. IF YOU CHECKED OFF ANY PROBLEMS, HOW DIFFICULT HAVE THESE PROBLEMS MADE IT FOR YOU TO DO YOUR WORK, TAKE CARE OF THINGS AT HOME, OR GET ALONG WITH OTHER PEOPLE: NOT DIFFICULT AT ALL
SUM OF ALL RESPONSES TO PHQ QUESTIONS 1-9: 6

## 2023-11-01 NOTE — PATIENT INSTRUCTIONS
Follow-up every 4 to 6 months or sooner as needed for chronic care management      Pulmonary Function Test []03/06/2302/27/24022402/27/23 Auth. provider:  Serene Lyle APRN CNP Assoc. diagnoses:  SOB (shortness of breath) Comment:  .  Q: Procedure performed at:  A: [facility]  Q: Reason for procedure:  A: TRAN - smoking, chronic bronchitis and cough Q: Complete PFTs - Includes: flow volume loop, DLCO (diffusing capacity), plethysmography (lung volumes):  A: Yes Q: Flow volume loops with bronchodilators if needed (Pre and Post Spirometry):  A: Yes Q: Methacholine Bronchial Challenge:  A: No Q: Order to be scheduled by [facility]plant Complex ?:  A: Yes     Wegovy for weight loss - 1mg - refilled today    GERD - abd pain - Omeprazole 40mg once dialy for 14 days - after that - famotidine very 12 hours as needed    Smoking - nicotine patch 7mg daily and take gum (2mg) as needed up to every 30-60mins.                           Nicotine Transdermal System   Habitrol, Nicoderm C-Q    Uses  For quitting smoking.    Instructions  DO NOT take this medicine by mouth.    Avoid placing the patch near the breast.    Remove the patch after 24 hours.    Keep the medicine at room temperature. Avoid heat and direct light.    This patch should not be cut.    Wash your hands before and after handling this medicine.    Remove old patch before applying new one. Change the location of the new patch.    If you have vivid dreams or trouble sleeping, you may remove the patch before going to sleep.    Ask your doctor or pharmacist about locations on your body where this patch can be used.    Remove the plastic liner that protects the sticky side of the patch before applying to the skin.    Be sure the area of skin is clean and dry before putting on a new patch.    Apply the patch to a clean, dry, hairless area.    Press the patch firmly for a few seconds to make sure it stays in place.    After removing the patch, fold  it together and discard it out of reach of children and pets.    Please ask your doctor or pharmacist how you can safely dispose of used patches.    If the skin under the patch becomes irritated, remove the patch. Do not apply a new patch to the area until the skin feels better.    To avoid irritating your skin, use a different location for a new patch.    Apply the patch only to normal looking skin. Avoid areas of the skin that are red, have scrapes, or damaged.    If the patch falls off, apply a new a patch on a different location of the body.    Please tell your doctor and pharmacist about all the medicines you take. Include both prescription and over-the-counter medicines. Also tell them about any vitamins, herbal medicines, or anything else you take for your health.    If you need to stop this medicine, your doctor may wish to gradually reduce the dosage before stopping.    Do not use more than 1 patch at any one time.    Cautions  Tell your doctor and pharmacist if you ever had an allergic reaction to a medicine. Symptoms of an allergic reaction can include trouble breathing, skin rash, itching, swelling, or severe dizziness.    Do not use the medication any more than instructed.    Avoid smoking while on this medicine. Smoking may increase your risk for stroke, heart attack, blood clots, high blood pressure, and other diseases of the heart and blood vessels.    Tell the doctor or pharmacist if you are pregnant, planning to be pregnant, or breastfeeding.    Ask your pharmacist if this medicine can interact with any of your other medicines. Be sure to tell them about all the medicines you take.    Please tell all your doctors and dentists that you are on this medicine before they provide care.    Side Effects  The following is a list of some common side effects from this medicine. Please speak with your doctor about what you should do if you experience these or other side effects.    skin irritation where  medicine is applied    If you have any of the following side effects, you may be getting too much medicine. Please contact your doctor to let them know about these side effects.    diarrhea  dizziness  nausea  rapid heartbeat  vomiting    A few people may have an allergic reaction to this medicine. Symptoms can include difficulty breathing, skin rash, itching, swelling, or severe dizziness. If you notice any of these symptoms, seek medical help quickly.    Extra  Please speak with your doctor, nurse, or pharmacist if you have any questions about this medicine.      [email or web address]  IMPORTANT NOTE: This document tells you briefly how to take your medicine, but it does not tell you all there is to know about it. Your doctor or pharmacist may give you other documents about your medicine. Please talk to them if you have any questions. Always follow their advice. There is a more complete description of this medicine available in English. Scan this code on your smartphone or tablet or use the web address below. You can also ask your pharmacist for a printout. If you have any questions, please ask your pharmacist.   2021 [employer]      1247-7532 [employer]. All rights reserved. This information is not intended as a substitute for professional medical care. Always follow your healthcare professional's instructions.

## 2023-11-01 NOTE — PROGRESS NOTES
Assessment & Plan     Episode of heavy vaginal bleeding  Patient with a history of fibroids.  Checking an ultrasound to rule out enlarged fibroid.  Patient otherwise is not having vaginal bleeding outside of periods at this time.  Has only had 1 extremely heavy period.  We will also check for STIs including gonorrhea chlamydia and trichomoniasis, BV to rule out other causes of heavy menses.  We will also check CBC to assure she does not have anemia after recent heavy.  Given her symptoms of weakness  - US Pelvic Complete with Transvaginal    Nicotine dependence, uncomplicated, unspecified nicotine product type  Smoking cessation discussed today and different options.  Discussed Chantix.  Patient was on Chantix previously but had increased thoughts of suicidal ideation.  She is being managed by psychiatry outside of the clinic and currently has stable moderate depression on high-dose Lamictal.  She is discussing with her psychiatrist to see if she can decrease this dose of Lamictal.  Discussed looking cessation plan.  We will do a low-dose NicoDerm patch 1 every 24 hours.  Patient does smoke in the middle of the night and therefore I do think it is appropriate to wear this 24 hours.  Discussed the risk of dreams while on the nicotine patch at night.  Continue NicoDerm gum 2 to 4 mg as needed for nicotine ball.  Discussed [place] quit partner referral made today.      - [place] Quit Partner Referral  - SMOKING CESSATION COUNSELING 3-10 MIN  - nicotine (NICODERM CQ) 7 MG/24HR 24 hr patch  Dispense: 14 patch; Refill: 0  - nicotine (NICORETTE) 4 MG gum  Dispense: 100 each; Refill: 11    Mood disorder (H24)  See above.  Continue psychiatry and therapy outside of clinic.  Continue groups to support alcohol abstinence  - lamoTRIgine (LAMICTAL) 100 MG tablet    Prediabetes  Patient with a recent hemoglobin A1c of 6% per review of her endocrine labs.  She was followed by [place] endocrinologist.  Information not available  through care everywhere.  They are managing her Wegovy and she is requesting that PCP take over management of this medication.  I refilled the 1 mg pen once a week.  Of note she did have nausea and vomiting with increased dose but that has since subsided.  She does continue to take Zofran as needed.  Today we will recheck hemoglobin A1c and periodically.  She has been losing weight with this medication.  Approval for continuation.  - Semaglutide-Weight Management (WEGOVY) 1 MG/0.5ML pen  Dispense: 2 mL; Refill: 6  - Hemoglobin A1c  - Comprehensive metabolic panel (BMP + Alb, Alk Phos, ALT, AST, Total. Bili, TP)  - Hemoglobin A1c  - Comprehensive metabolic panel (BMP + Alb, Alk Phos, ALT, AST, Total. Bili, TP)    Morbid obesity (H)  See above.  Significant success seen with 1 mg dose of Wegovy weekly so far.  Discussed follow-up in 6 months and we can titrate between now and then if weight starts to plateau.  Discussed decreasing sweets and increasing exercise and fiber in the diet  - Semaglutide-Weight Management (WEGOVY) 1 MG/0.5ML pen  Dispense: 2 mL; Refill: 6  - Hemoglobin A1c  - Comprehensive metabolic panel (BMP + Alb, Alk Phos, ALT, AST, Total. Bili, TP)  - Hemoglobin A1c  - Comprehensive metabolic panel (BMP + Alb, Alk Phos, ALT, AST, Total. Bili, TP)    Menorrhagia with regular cycle  See episode of heavy vaginal bleeding above  - Wet prep - Clinic Collect  - Chlamydia & Gonorrhea by PCR, GICH/Range - Clinic Collect  - CBC with platelets  - CBC with platelets    Gastroesophageal reflux disease with esophagitis without hemorrhage  Patient with GERD since starting Wegovy.  Previously taking Tums daily.  Discussed that a short period on omeprazole daily for 14 days and then heading switching to famotidine twice daily as needed for reflux.  Avoid triggers.  If no improvement with these medications consider H. pylori testing versus EGD.  - omeprazole (PRILOSEC) 40 MG DR capsule  Dispense: 14 capsule; Refill:  "0  - famotidine (PEPCID) 20 MG tablet  Dispense: 180 tablet; Refill: 0    Cervical cancer screening  Pap tolerated well today -unable to isolate the cervix.  Question about transformation zone however if HPV is negative still recommend repeat in 5 years  - Pap Screen with HPV - recommended age 30 - 65 years    Patient still does report shortness of breath.  Was previously taking her son's albuterol.  She does have a referral in place for  Pulmonary function testing given her long smoking history I wonder about chronic bronchitis.  Phone number given to patient for pulmonary function testing to be scheduled as soon as possible with a trial of GERMAN response    Follow-up every 6 months for chronic care management or sooner as needed               Nicotine/Tobacco Cessation:  She reports that she has been smoking cigarettes. She has been smoking an average of .5 packs per day. She has never used smokeless tobacco.  Nicotine/Tobacco Cessation Plan:   Pharmacotherapies : Nicotine patch and Nicotine gum      BMI:   Estimated body mass index is 36.74 kg/m  as calculated from the following:    Height as of this encounter: 1.651 m (5' 5\").    Weight as of this encounter: 100.2 kg (220 lb 12.8 oz).   Weight management plan: Discussed healthy diet and exercise guidelines Specific weight management program called wegovy  discussed        YEN Capellan CNP [facility]    Mee Kelly is a 33 year old, presenting for the following health issues:  Follow Up (Follow up from last visit and flu shot)        11/1/2023    10:56 AM   Additional Questions   Roomed by lc-lpn   Accompanied by n/a       History of Present Illness       Reason for visit:  Primary care    She eats 0-1 servings of fruits and vegetables daily.She consumes 0 sweetened beverage(s) daily.She exercises with enough effort to increase her heart rate 9 or less minutes per day.  She exercises with enough effort to increase " "her heart rate 3 or less days per week.   She is taking medications regularly.     2 days of heavy menses - large clots - tampon every 30 mins,     Smoking - has been working on this - requesting Gum and Patches - smoking within 5 months of waking up , after eating, right after shower, (after completing a task).  Smoking 1/2 PPD.    DM2 - wegovy - on 1mg for a month - had SE with increased dose.   Decreasing appetite = has been eating a lot of sweet - not eat meals.     Regular cycle    GERD with since starting semiglutide - worse a month ago - taking tums daily - having chest pain.     Previously seen by Endocrinology - unable to continue to be followed by them due to insurance changes.     5/18/23/- 257 - now 220 pounds today - eating 1 meal/day has been candy.    Chest tightness, pain with breathing at times - feels like breathig in cold air - didn't go to the PFT.             Review of Systems   Constitutional, HEENT, cardiovascular, pulmonary, gi and gu systems are negative, except as otherwise noted.      Objective    /80 (BP Location: Right arm, Patient Position: Sitting, Cuff Size: Adult Regular)   Pulse 107   Temp 99.3  F (37.4  C) (Oral)   Resp 20   Ht 1.651 m (5' 5\")   Wt 100.2 kg (220 lb 12.8 oz)   LMP 10/25/2023 (Approximate)   SpO2 98%   BMI 36.74 kg/m    Body mass index is 36.74 kg/m .  Physical Exam   GENERAL: healthy, alert and no distress  RESP: lungs clear to auscultation - no rales, rhonchi or wheezes  CV: regular rate and rhythm, normal S1 S2, no S3 or S4, no murmur, click or rub, no peripheral edema and peripheral pulses strong  ABDOMEN: soft, nontender, no hepatosplenomegaly, no masses and bowel sounds normal   (female): normal female external genitalia, normal urethral meatus, vaginal mucosa pink, moist, well rugated, and normal cervix/adnexa/uterus without masses or discharge  MS: no gross musculoskeletal defects noted, no edema  NEURO: Normal strength and tone, mentation " intact and speech normal  PSYCH: mentation appears normal, affect normal/bright

## 2023-11-04 LAB
BKR LAB AP GYN ADEQUACY: NORMAL
BKR LAB AP GYN INTERPRETATION: NORMAL
BKR LAB AP HPV REFLEX: NORMAL
BKR LAB AP LMP: NORMAL
BKR LAB AP PREVIOUS ABNORMAL: NORMAL
PATH REPORT.COMMENTS IMP SPEC: NORMAL
PATH REPORT.COMMENTS IMP SPEC: NORMAL
PATH REPORT.RELEVANT HX SPEC: NORMAL

## 2023-11-07 LAB
HUMAN PAPILLOMA VIRUS 16 DNA: NEGATIVE
HUMAN PAPILLOMA VIRUS 18 DNA: NEGATIVE
HUMAN PAPILLOMA VIRUS FINAL DIAGNOSIS: NORMAL
HUMAN PAPILLOMA VIRUS OTHER HR: NEGATIVE

## 2023-11-07 RX ORDER — ONDANSETRON 4 MG/1
4 TABLET, ORALLY DISINTEGRATING ORAL EVERY 8 HOURS PRN
Qty: 60 TABLET | Refills: 1 | OUTPATIENT
Start: 2023-11-07 | End: 2024-08-08

## 2023-11-15 ENCOUNTER — ANCILLARY PROCEDURE (OUTPATIENT)
Dept: GENERAL RADIOLOGY | Facility: CLINIC | Age: 33
End: 2023-11-15
Attending: PHYSICIAN ASSISTANT
Payer: COMMERCIAL

## 2023-11-15 ENCOUNTER — OFFICE VISIT (OUTPATIENT)
Dept: FAMILY MEDICINE | Facility: CLINIC | Age: 33
End: 2023-11-15
Payer: COMMERCIAL

## 2023-11-15 VITALS
DIASTOLIC BLOOD PRESSURE: 85 MMHG | OXYGEN SATURATION: 96 % | HEART RATE: 109 BPM | BODY MASS INDEX: 36.94 KG/M2 | SYSTOLIC BLOOD PRESSURE: 127 MMHG | RESPIRATION RATE: 32 BRPM | TEMPERATURE: 98.6 F | WEIGHT: 222 LBS

## 2023-11-15 DIAGNOSIS — J20.9 ACUTE BRONCHITIS, UNSPECIFIED ORGANISM: Primary | ICD-10-CM

## 2023-11-15 PROCEDURE — 71046 X-RAY EXAM CHEST 2 VIEWS: CPT | Mod: TC | Performed by: RADIOLOGY

## 2023-11-15 PROCEDURE — 99214 OFFICE O/P EST MOD 30 MIN: CPT | Performed by: PHYSICIAN ASSISTANT

## 2023-11-15 RX ORDER — PREDNISONE 20 MG/1
40 TABLET ORAL DAILY
Qty: 10 TABLET | Refills: 0 | Status: SHIPPED | OUTPATIENT
Start: 2023-11-15 | End: 2023-11-20

## 2023-11-15 NOTE — PROGRESS NOTES
Assessment & Plan:      Problem List Items Addressed This Visit    None  Visit Diagnoses       Acute bronchitis, unspecified organism    -  Primary    Relevant Medications    predniSONE (DELTASONE) 20 MG tablet    Other Relevant Orders    XR Chest 2 Views (Completed)          Medical Decision Making  Patient with history of smoking presents with cough and shortness of breath for 1 week.  Chest x-ray is negative for focal pneumonia.  Suspect symptoms are likely an acute bronchitis secondary to a viral upper respiratory infection.  Recommend oral steroids and an albuterol inhaler.  Discussed treatment and symptomatic care.  Allergies and medication interactions reviewed.  Discussed signs of worsening symptoms and when to follow-up with PCP if no symptom improvement.     Subjective:      Nhung Concepcion is a 33 year old female here for evaluation of cough and shortness of breath.  Onset of symptoms was 1 week ago.  Symptoms have been gradually worsening since then.  Patient notes thick productive cough and has difficulties getting the mucus up.  She also notes chest tightness and sensation of pressure over the chest.  No fevers.     The following portions of the patient's history were reviewed and updated as appropriate: allergies, current medications, and problem list.     Review of Systems  Pertinent items are noted in HPI.    Allergies  Allergies   Allergen Reactions    Varenicline      Other reaction(s): Nightmares    Wellbutrin [Bupropion]      History of seizure activity while treated with Wellbutrin    Sulfa Antibiotics Rash       Family History   Problem Relation Age of Onset    Breast Cancer Maternal Grandmother     Colon Cancer No family hx of        Social History     Tobacco Use    Smoking status: Former     Packs/day: .5     Types: Cigarettes     Quit date: 11/10/2023     Years since quittin.0    Smokeless tobacco: Never    Tobacco comments:     Currently using 4 mg Gum   Substance Use Topics     Alcohol use: Not Currently        Objective:      /85   Pulse 109   Temp 98.6  F (37  C) (Oral)   Resp (!) 32   Wt 100.7 kg (222 lb)   LMP 10/25/2023 (Approximate)   SpO2 96%   BMI 36.94 kg/m    General appearance - alert, well appearing, and in no distress and non-toxic  Ears - TMs intact with moderate mucoid fluid and bulging bilaterally, no erythema  Nose - normal and patent, no erythema, discharge or polyps  Mouth - mucous membranes moist, pharynx normal without lesions  Neck - supple, no significant adenopathy  Chest - upper airway congestion heard throughout, expiratory wheezing heard, no obvious rhonchi or rails  Heart - tachycardic, normal S1 and S2, no murmurs rubs or gallops     Lab & Imaging Results    Results for orders placed or performed in visit on 11/15/23   XR Chest 2 Views     Status: None    Narrative    EXAM: XR CHEST 2 VIEWS  LOCATION: [facility]  DATE: 11/15/2023    INDICATION:  Acute cough  COMPARISON: PA and lateral views of the chest 09/15/2022      Impression    IMPRESSION: Negative chest.       I personally reviewed these results and discussed findings with the patient.    The use of Dragon/[employer] dictation services was used to construct the content of this note; any grammatical errors are non-intentional. Please contact the author directly if you are in need of any clarification.

## 2023-11-26 ENCOUNTER — E-VISIT (OUTPATIENT)
Dept: FAMILY MEDICINE | Facility: CLINIC | Age: 33
End: 2023-11-26
Payer: COMMERCIAL

## 2023-11-26 DIAGNOSIS — J45.41 MODERATE PERSISTENT ASTHMA WITH ACUTE EXACERBATION: Primary | ICD-10-CM

## 2023-11-26 PROCEDURE — 99422 OL DIG E/M SVC 11-20 MIN: CPT | Performed by: NURSE PRACTITIONER

## 2023-11-27 RX ORDER — BUDESONIDE AND FORMOTEROL FUMARATE DIHYDRATE 160; 4.5 UG/1; UG/1
2 AEROSOL RESPIRATORY (INHALATION) 2 TIMES DAILY
Qty: 10.2 G | Refills: 3 | Status: SHIPPED | OUTPATIENT
Start: 2023-11-27 | End: 2024-01-15

## 2023-11-27 RX ORDER — MONTELUKAST SODIUM 10 MG/1
10 TABLET ORAL AT BEDTIME
Qty: 90 TABLET | Refills: 0 | Status: SHIPPED | OUTPATIENT
Start: 2023-11-27 | End: 2024-01-15

## 2023-11-27 NOTE — TELEPHONE ENCOUNTER
Provider E-Visit time total (minutes): 12    Negative chest xray on 11/15/23  Reviewed  EXAM: XR CHEST 2 VIEWS  LOCATION: [facility]  DATE: 11/15/2023     INDICATION:  Acute cough  COMPARISON: PA and lateral views of the chest 09/15/2022                                                                      IMPRESSION: Negative chest.

## 2023-11-27 NOTE — PATIENT INSTRUCTIONS
Thank you for choosing us for your care. I have placed an order for a prescription so that you can start treatment. View your full visit summary for details by clicking on the link below. Your pharmacist will able to address any questions you may have about the medication.     If you're not feeling better within 5-7 days, please schedule an appointment.  You can schedule an appointment right here in [facility], or call [phone]  If the visit is for the same symptoms as your eVisit, we'll refund the cost of your eVisit if seen within seven days.      I reviewed the chest x-ray from 11/15/2023 -which shows he did not have pneumonia at that time -and given the fact that you report not having any fever at this time it is not likely that you have developed a pneumonia and therefore do not need an antibiotic at this time.     Would like you to start taking montelukast.  This is a tablet for asthma.  And allergies.  Please take this daily.  Please start your Symbicort.  2 puffs twice a day every single day even if you are not feeling symptoms.  Use your albuterol as needed   Is very important to take these medications as prescribed and be sure to take these above daily medications    Continue to stop smoking.  Great job on this.  I do feel that this will help your pulmonary symptoms in the future   Please schedule a pulmonary function test again as soon as possible as I would like to rule out the possibility of you having COPD  After your pulmonary function test scheduled please schedule an office visit in person with me 1 to 2 days after this test to go over the results and plan of care    If your symptoms do not improve or resolve  After 10 to 14 days please make an in person visit  Please also call us if you develop a fever    Leticia Crowe APRN CNP on 11/27/2023 at 9:05 AM

## 2023-12-07 ENCOUNTER — OFFICE VISIT (OUTPATIENT)
Dept: PULMONOLOGY | Facility: CLINIC | Age: 33
End: 2023-12-07
Attending: NURSE PRACTITIONER
Payer: COMMERCIAL

## 2023-12-07 DIAGNOSIS — R06.02 SOB (SHORTNESS OF BREATH): ICD-10-CM

## 2023-12-07 LAB
DLCOUNC-%PRED-PRE: 121 %
DLCOUNC-PRE: 27.02 ML/MIN/MMHG
DLCOUNC-PRED: 22.32 ML/MIN/MMHG
ERV-%PRED-PRE: 102 %
ERV-PRE: 1.3 L
ERV-PRED: 1.27 L
EXPTIME-PRE: 9.19 SEC
FEF2575-%PRED-POST: 55 %
FEF2575-%PRED-PRE: 42 %
FEF2575-POST: 1.8 L/SEC
FEF2575-PRE: 1.38 L/SEC
FEF2575-PRED: 3.24 L/SEC
FEFMAX-%PRED-PRE: 82 %
FEFMAX-PRE: 5.93 L/SEC
FEFMAX-PRED: 7.16 L/SEC
FEV1-%PRED-PRE: 86 %
FEV1-PRE: 2.59 L
FEV1FEV6-PRE: 66 %
FEV1FEV6-PRED: 84 %
FEV1FVC-PRE: 63 %
FEV1FVC-PRED: 84 %
FEV1SVC-PRE: 60 %
FEV1SVC-PRED: 81 %
FIFMAX-PRE: 4.95 L/SEC
FRCPLETH-%PRED-PRE: 131 %
FRCPLETH-PRE: 3.59 L
FRCPLETH-PRED: 2.73 L
FVC-%PRED-PRE: 114 %
FVC-PRE: 4.09 L
FVC-PRED: 3.57 L
IC-%PRED-PRE: 119 %
IC-PRE: 3.05 L
IC-PRED: 2.55 L
RVPLETH-%PRED-PRE: 146 %
RVPLETH-PRE: 2.29 L
RVPLETH-PRED: 1.56 L
TLCPLETH-%PRED-PRE: 129 %
TLCPLETH-PRE: 6.63 L
TLCPLETH-PRED: 5.11 L
VA-%PRED-PRE: 111 %
VA-PRE: 5.61 L
VC-%PRED-PRE: 117 %
VC-PRE: 4.35 L
VC-PRED: 3.69 L

## 2023-12-07 PROCEDURE — 94729 DIFFUSING CAPACITY: CPT | Performed by: INTERNAL MEDICINE

## 2023-12-07 PROCEDURE — 94060 EVALUATION OF WHEEZING: CPT | Performed by: INTERNAL MEDICINE

## 2023-12-07 PROCEDURE — 94726 PLETHYSMOGRAPHY LUNG VOLUMES: CPT | Performed by: INTERNAL MEDICINE

## 2023-12-08 NOTE — RESULT ENCOUNTER NOTE
Please make a follow-up visit with me to discuss the results of your pulmonary function testing.  It was abnormal and I would like to further discuss with you the pathophysiology that we are seeing here in the symptoms that you are having to come up with a plan of care together

## 2024-01-15 ENCOUNTER — VIRTUAL VISIT (OUTPATIENT)
Dept: FAMILY MEDICINE | Facility: CLINIC | Age: 34
End: 2024-01-15
Payer: COMMERCIAL

## 2024-01-15 DIAGNOSIS — E66.01 MORBID OBESITY (H): ICD-10-CM

## 2024-01-15 DIAGNOSIS — R73.03 PREDIABETES: ICD-10-CM

## 2024-01-15 DIAGNOSIS — F17.200 SMOKING ADDICTION: ICD-10-CM

## 2024-01-15 DIAGNOSIS — J41.8 MIXED SIMPLE AND MUCOPURULENT CHRONIC BRONCHITIS (H): ICD-10-CM

## 2024-01-15 DIAGNOSIS — J43.9 PULMONARY EMPHYSEMA, UNSPECIFIED EMPHYSEMA TYPE (H): ICD-10-CM

## 2024-01-15 DIAGNOSIS — K21.00 GASTROESOPHAGEAL REFLUX DISEASE WITH ESOPHAGITIS WITHOUT HEMORRHAGE: ICD-10-CM

## 2024-01-15 DIAGNOSIS — R11.0 NAUSEA: Primary | ICD-10-CM

## 2024-01-15 PROCEDURE — 99214 OFFICE O/P EST MOD 30 MIN: CPT | Mod: 95 | Performed by: NURSE PRACTITIONER

## 2024-01-15 RX ORDER — OMEPRAZOLE 40 MG/1
40 CAPSULE, DELAYED RELEASE ORAL DAILY
Qty: 90 CAPSULE | Refills: 0 | Status: SHIPPED | OUTPATIENT
Start: 2024-01-15 | End: 2024-01-17

## 2024-01-15 RX ORDER — ONDANSETRON 8 MG/1
8 TABLET, FILM COATED ORAL EVERY 8 HOURS PRN
Qty: 30 TABLET | Refills: 0 | Status: SHIPPED | OUTPATIENT
Start: 2024-01-15 | End: 2024-01-17

## 2024-01-15 ASSESSMENT — PATIENT HEALTH QUESTIONNAIRE - PHQ9: SUM OF ALL RESPONSES TO PHQ QUESTIONS 1-9: 2

## 2024-01-15 ASSESSMENT — ASTHMA QUESTIONNAIRES
QUESTION_3 LAST FOUR WEEKS HOW OFTEN DID YOUR ASTHMA SYMPTOMS (WHEEZING, COUGHING, SHORTNESS OF BREATH, CHEST TIGHTNESS OR PAIN) WAKE YOU UP AT NIGHT OR EARLIER THAN USUAL IN THE MORNING: ONCE OR TWICE
EMERGENCY_ROOM_LAST_YEAR_TOTAL: ONE
QUESTION_5 LAST FOUR WEEKS HOW WOULD YOU RATE YOUR ASTHMA CONTROL: SOMEWHAT CONTROLLED
QUESTION_1 LAST FOUR WEEKS HOW MUCH OF THE TIME DID YOUR ASTHMA KEEP YOU FROM GETTING AS MUCH DONE AT WORK, SCHOOL OR AT HOME: NONE OF THE TIME
QUESTION_2 LAST FOUR WEEKS HOW OFTEN HAVE YOU HAD SHORTNESS OF BREATH: ONCE A DAY
ACT_TOTALSCORE: 19
QUESTION_4 LAST FOUR WEEKS HOW OFTEN HAVE YOU USED YOUR RESCUE INHALER OR NEBULIZER MEDICATION (SUCH AS ALBUTEROL): NOT AT ALL
ACT_TOTALSCORE: 19

## 2024-01-15 NOTE — PROGRESS NOTES
Leticia is a 34 year old who is being evaluated via a billable video visit.      How would you like to obtain your AVS? MyChart  If the video visit is dropped, the invitation should be resent by: Text to cell phone: [phone]  Will anyone else be joining your video visit? No          Assessment & Plan     Prediabetes  Refilling wegovy  -A1c improved in 11/2023  - Semaglutide-Weight Management (WEGOVY) 1 MG/0.5ML pen  Dispense: 2 mL; Refill: 6    Morbid obesity (H)  See above  Discussed exercise today  - Semaglutide-Weight Management (WEGOVY) 1 MG/0.5ML pen  Dispense: 2 mL; Refill: 6    Gastroesophageal reflux disease with esophagitis without hemorrhage  Stop famotidine  Will do 1-3 month course of high dose PPI to see if this improves  - omeprazole (PRILOSEC) 40 MG DR capsule  Dispense: 90 capsule; Refill: 0  - ondansetron (ZOFRAN) 8 MG tablet  Dispense: 30 tablet; Refill: 0    Nausea  Secondary to GLP1i - take as needed refilled  - ondansetron (ZOFRAN) 8 MG tablet  Dispense: 30 tablet; Refill: 0    Mixed simple and mucopurulent chronic bronchitis (H)  Will start on LAMA/LABA combo medication daily  Take albuterol PRN  Discussed new diagnosis for COPD - pathophys  Discussed medication  Tripod breathing, pursed lip breathing  Discussed smking cessation  CV 20 given 11/2023  Needing COVID booster - pt to schedule follow up  Recommend flu shot annually  Up to date on TDAP    - umeclidinium-vilanterol (ANORO ELLIPTA) 62.5-25 MCG/ACT oral inhaler  Dispense: 60 each; Refill: 3    Pulmonary emphysema, unspecified emphysema type (H)  See above  - umeclidinium-vilanterol (ANORO ELLIPTA) 62.5-25 MCG/ACT oral inhaler  Dispense: 60 each; Refill: 3    Smoking addiction  Refuses nicotine at this time  Pre-contemplative stage at this time    Spent 30mins doing chart review, history and exam, patient education, documentation and further activities per the note.                 YEN Capellan [facility]  MARIBELL Kelly is a 34 year old, presenting for the following health issues:  Follow Up (Follow up from last visit)      HPI   #Smoking 1/2-1ppd  Stress level influences this  Has tried Chantix - SI SE so stopped by psychiatrist  Bupropion - has had seizures with this in the past   Not currently taking nicotine replacement, not interested at this time    #PFTs done 12/7/23 - shows chronic emphysema and obstruction   mMRC - Symptoms of breathlessness - grade 2-3 - stairs, dressing when getting out of the shower.    #depression  Started seeing therapist again - does have SI - no plan - started therapist again last week - seeing weekly   Has been going through a lot of life changes     Seeing psychiatrist - cymbalta 60mg,  lamictal 150 in am and 200mg at night.     #ETOH: 17 months of sobriety - attending meeting - 2 times/week.      GERD: has abd pain - famotidine not helpful. Gags when swallowing things  - daily famotidine not helpful.  Hx of EGD 3/25/19 - see below    Impression:               - Normal examined duodenum.                             - Normal stomach. Biopsied.                             - Z-line regular, 39 cm from the incisors.   Recommendation:           - The patient will be observed post-procedure,                             until all discharge criteria are met.                             - Patient has a contact number available for                             emergencies. The signs and symptoms of potential                             delayed complications were discussed with the                             patient. Return to normal activities tomorrow.                             Written discharge instructions were provided to the                             patient.                             - Conitnue GERD therapy per PCP.                             - GERD diet and lifestyle changes.                             - Await biopsy results.                              - Follow with PCP as needed / scheduled.                 Review of Systems   Constitutional, HEENT, cardiovascular, pulmonary, gi and gu systems are negative, except as otherwise noted.      Objective           Vitals:  No vitals were obtained today due to virtual visit.    Physical Exam   GENERAL: Healthy, alert and no distress  EYES: Eyes grossly normal to inspection.  No discharge or erythema, or obvious scleral/conjunctival abnormalities.  RESP: No audible wheeze, cough, or visible cyanosis.  No visible retractions or increased work of breathing.    SKIN: Visible skin clear. No significant rash, abnormal pigmentation or lesions.  NEURO: Cranial nerves grossly intact.  Mentation and speech appropriate for age.  PSYCH: Mentation appears normal, affect normal/bright, judgement and insight intact, normal speech and appearance well-groomed.                Video-Visit Details    Type of service:  Video Visit     Originating Location (pt. Location): Home    Distant Location (provider location):  On-site  Platform used for Video Visit: Zoom ([email or web address])    Joined the call at 1/15/2024, 5:50:54 pm.  Left the call at 1/15/2024, 6:18:10 pm.  You were on the call for 27 minutes 15 seconds .

## 2024-01-15 NOTE — Clinical Note
Please call and schedule follow up in 2 months with me in any provider aproved blocked spot - should be seen in person.   Thank you

## 2024-01-16 ENCOUNTER — MYC MEDICAL ADVICE (OUTPATIENT)
Dept: FAMILY MEDICINE | Facility: CLINIC | Age: 34
End: 2024-01-16
Payer: COMMERCIAL

## 2024-01-16 DIAGNOSIS — J43.9 PULMONARY EMPHYSEMA, UNSPECIFIED EMPHYSEMA TYPE (H): ICD-10-CM

## 2024-01-16 DIAGNOSIS — E66.01 MORBID OBESITY (H): ICD-10-CM

## 2024-01-16 DIAGNOSIS — J41.8 MIXED SIMPLE AND MUCOPURULENT CHRONIC BRONCHITIS (H): ICD-10-CM

## 2024-01-16 DIAGNOSIS — R11.0 NAUSEA: ICD-10-CM

## 2024-01-16 DIAGNOSIS — R73.03 PREDIABETES: ICD-10-CM

## 2024-01-16 DIAGNOSIS — K21.00 GASTROESOPHAGEAL REFLUX DISEASE WITH ESOPHAGITIS WITHOUT HEMORRHAGE: ICD-10-CM

## 2024-01-16 PROBLEM — F17.200 SMOKING ADDICTION: Status: ACTIVE | Noted: 2024-01-16

## 2024-01-16 RX ORDER — UMECLIDINIUM BROMIDE AND VILANTEROL TRIFENATATE 62.5; 25 UG/1; UG/1
1 POWDER RESPIRATORY (INHALATION) DAILY
Qty: 60 EACH | Refills: 3 | Status: SHIPPED | OUTPATIENT
Start: 2024-01-16 | End: 2024-01-17

## 2024-01-17 ENCOUNTER — TELEPHONE (OUTPATIENT)
Dept: FAMILY MEDICINE | Facility: CLINIC | Age: 34
End: 2024-01-17

## 2024-01-17 RX ORDER — UMECLIDINIUM BROMIDE AND VILANTEROL TRIFENATATE 62.5; 25 UG/1; UG/1
1 POWDER RESPIRATORY (INHALATION) DAILY
Qty: 60 EACH | Refills: 3 | Status: SHIPPED | OUTPATIENT
Start: 2024-01-17

## 2024-01-17 RX ORDER — ONDANSETRON 8 MG/1
8 TABLET, FILM COATED ORAL EVERY 8 HOURS PRN
Qty: 30 TABLET | Refills: 0 | Status: SHIPPED | OUTPATIENT
Start: 2024-01-17 | End: 2024-02-28

## 2024-01-17 RX ORDER — OMEPRAZOLE 40 MG/1
40 CAPSULE, DELAYED RELEASE ORAL DAILY
Qty: 90 CAPSULE | Refills: 0 | Status: SHIPPED | OUTPATIENT
Start: 2024-01-17 | End: 2024-09-16

## 2024-01-17 NOTE — TELEPHONE ENCOUNTER
Talked to patient and a follow up appointment were schedule.  Aleksandra Perez MA on 1/17/2024 at 12:28 PM

## 2024-02-12 ENCOUNTER — MYC MEDICAL ADVICE (OUTPATIENT)
Dept: FAMILY MEDICINE | Facility: CLINIC | Age: 34
End: 2024-02-12
Payer: COMMERCIAL

## 2024-02-15 ENCOUNTER — E-VISIT (OUTPATIENT)
Dept: FAMILY MEDICINE | Facility: CLINIC | Age: 34
End: 2024-02-15
Payer: COMMERCIAL

## 2024-02-15 DIAGNOSIS — R06.02 SHORTNESS OF BREATH: Primary | ICD-10-CM

## 2024-02-15 PROCEDURE — 99207 PR NON-BILLABLE SERV PER CHARTING: CPT | Performed by: NURSE PRACTITIONER

## 2024-02-15 NOTE — PATIENT INSTRUCTIONS
I am covering for Serene today-I think with the shortness of breath you should be evaluated in person. There are multiple causes of this including virus, but you should at least have lungs listened to and oxygen assessed.    I would definitely use the inhaler if you are not currently doing so as I think that would be significantly helpful.    Thank you for choosing us for your care. I think an in-clinic visit would be best next steps based on your symptoms. Please schedule a clinic appointment; you won t be charged for this eVisit.      You can schedule an appointment right here in [facility], or call [phone]

## 2024-02-15 NOTE — TELEPHONE ENCOUNTER
Left LVMTCB ok to use any Gender Care or Provider Approval slots per Serene Crowe,  Sasha Rodriguez

## 2024-02-15 NOTE — TELEPHONE ENCOUNTER
Please schedule Leticia in any gender care or provider approve spot for in person COPD and smoking follow up visit in the next month.    Thank you

## 2024-02-20 ENCOUNTER — NURSE TRIAGE (OUTPATIENT)
Dept: NURSING | Facility: CLINIC | Age: 34
End: 2024-02-20
Payer: COMMERCIAL

## 2024-02-20 NOTE — TELEPHONE ENCOUNTER
Pt returning call to call clinic back - pt transferred to [facility].  Tessa Harris RN  [facility] Nurse Advisor

## 2024-02-28 ENCOUNTER — OFFICE VISIT (OUTPATIENT)
Dept: FAMILY MEDICINE | Facility: CLINIC | Age: 34
End: 2024-02-28
Payer: COMMERCIAL

## 2024-02-28 VITALS
TEMPERATURE: 99.5 F | DIASTOLIC BLOOD PRESSURE: 78 MMHG | BODY MASS INDEX: 35.06 KG/M2 | OXYGEN SATURATION: 97 % | RESPIRATION RATE: 22 BRPM | HEART RATE: 111 BPM | HEIGHT: 65 IN | SYSTOLIC BLOOD PRESSURE: 128 MMHG | WEIGHT: 210.4 LBS

## 2024-02-28 DIAGNOSIS — F17.200 NICOTINE DEPENDENCE, UNCOMPLICATED, UNSPECIFIED NICOTINE PRODUCT TYPE: Primary | ICD-10-CM

## 2024-02-28 DIAGNOSIS — R06.09 DOE (DYSPNEA ON EXERTION): ICD-10-CM

## 2024-02-28 DIAGNOSIS — R73.03 PREDIABETES: ICD-10-CM

## 2024-02-28 DIAGNOSIS — R68.89 COLD INTOLERANCE: ICD-10-CM

## 2024-02-28 DIAGNOSIS — J41.8 MIXED SIMPLE AND MUCOPURULENT CHRONIC BRONCHITIS (H): ICD-10-CM

## 2024-02-28 DIAGNOSIS — F17.200 SMOKING ADDICTION: ICD-10-CM

## 2024-02-28 DIAGNOSIS — R07.1 CHEST PAIN ON BREATHING: ICD-10-CM

## 2024-02-28 DIAGNOSIS — J43.9 PULMONARY EMPHYSEMA, UNSPECIFIED EMPHYSEMA TYPE (H): ICD-10-CM

## 2024-02-28 DIAGNOSIS — E66.01 MORBID OBESITY (H): ICD-10-CM

## 2024-02-28 LAB
D DIMER PPP FEU-MCNC: <0.27 UG/ML FEU (ref 0–0.5)
ERYTHROCYTE [DISTWIDTH] IN BLOOD BY AUTOMATED COUNT: 13.2 % (ref 10–15)
HBA1C MFR BLD: 5.9 % (ref 0–5.6)
HCT VFR BLD AUTO: 38 % (ref 35–47)
HGB BLD-MCNC: 12.8 G/DL (ref 11.7–15.7)
MCH RBC QN AUTO: 31.2 PG (ref 26.5–33)
MCHC RBC AUTO-ENTMCNC: 33.7 G/DL (ref 31.5–36.5)
MCV RBC AUTO: 93 FL (ref 78–100)
PLATELET # BLD AUTO: 328 10E3/UL (ref 150–450)
RBC # BLD AUTO: 4.1 10E6/UL (ref 3.8–5.2)
WBC # BLD AUTO: 9.6 10E3/UL (ref 4–11)

## 2024-02-28 PROCEDURE — 99406 BEHAV CHNG SMOKING 3-10 MIN: CPT | Performed by: NURSE PRACTITIONER

## 2024-02-28 PROCEDURE — 83036 HEMOGLOBIN GLYCOSYLATED A1C: CPT | Performed by: NURSE PRACTITIONER

## 2024-02-28 PROCEDURE — 99214 OFFICE O/P EST MOD 30 MIN: CPT | Performed by: NURSE PRACTITIONER

## 2024-02-28 PROCEDURE — 80048 BASIC METABOLIC PNL TOTAL CA: CPT | Performed by: NURSE PRACTITIONER

## 2024-02-28 PROCEDURE — 85027 COMPLETE CBC AUTOMATED: CPT | Performed by: NURSE PRACTITIONER

## 2024-02-28 PROCEDURE — 36415 COLL VENOUS BLD VENIPUNCTURE: CPT | Performed by: NURSE PRACTITIONER

## 2024-02-28 PROCEDURE — 85379 FIBRIN DEGRADATION QUANT: CPT | Performed by: NURSE PRACTITIONER

## 2024-02-28 PROCEDURE — 84443 ASSAY THYROID STIM HORMONE: CPT | Performed by: NURSE PRACTITIONER

## 2024-02-28 RX ORDER — NICOTINE 21 MG/24HR
1 PATCH, TRANSDERMAL 24 HOURS TRANSDERMAL EVERY 24 HOURS
Qty: 30 PATCH | Refills: 0 | Status: SHIPPED | OUTPATIENT
Start: 2024-02-28 | End: 2024-03-29

## 2024-02-28 RX ORDER — NICOTINE 21 MG/24HR
1 PATCH, TRANSDERMAL 24 HOURS TRANSDERMAL EVERY 24 HOURS
Qty: 60 PATCH | Refills: 0 | Status: SHIPPED | OUTPATIENT
Start: 2024-04-10 | End: 2024-06-09

## 2024-02-28 RX ORDER — LEVALBUTEROL TARTRATE 45 UG/1
2 AEROSOL, METERED ORAL EVERY 4 HOURS PRN
Qty: 15 G | Refills: 5 | Status: SHIPPED | OUTPATIENT
Start: 2024-02-28

## 2024-02-28 NOTE — PATIENT INSTRUCTIONS
-Take a drink of water first    -The albuterol 1 puff, wait 2 mins, do second puff, and drink of water, then 2mins last do Anoro Ellipta     -continue Wegovy every other week - increase exercise as tolerated    -nicotine patches and gum ordered               Nicotine Transdermal System   Habitrol, Nicoderm C-Q    Uses  For quitting smoking.    Instructions  DO NOT take this medicine by mouth.    Avoid placing the patch near the breast.    Remove the patch after 24 hours.    Keep the medicine at room temperature. Avoid heat and direct light.    This patch should not be cut.    Wash your hands before and after handling this medicine.    Remove old patch before applying new one. Change the location of the new patch.    If you have vivid dreams or trouble sleeping, you may remove the patch before going to sleep.    Ask your doctor or pharmacist about locations on your body where this patch can be used.    Remove the plastic liner that protects the sticky side of the patch before applying to the skin.    Be sure the area of skin is clean and dry before putting on a new patch.    Apply the patch to a clean, dry, hairless area.    Press the patch firmly for a few seconds to make sure it stays in place.    After removing the patch, fold it together and discard it out of reach of children and pets.    Please ask your doctor or pharmacist how you can safely dispose of used patches.    If the skin under the patch becomes irritated, remove the patch. Do not apply a new patch to the area until the skin feels better.    To avoid irritating your skin, use a different location for a new patch.    Apply the patch only to normal looking skin. Avoid areas of the skin that are red, have scrapes, or damaged.    If the patch falls off, apply a new a patch on a different location of the body.    Please tell your doctor and pharmacist about all the medicines you take. Include both prescription and over-the-counter medicines. Also tell them  about any vitamins, herbal medicines, or anything else you take for your health.    If you need to stop this medicine, your doctor may wish to gradually reduce the dosage before stopping.    Do not use more than 1 patch at any one time.    Cautions  Tell your doctor and pharmacist if you ever had an allergic reaction to a medicine. Symptoms of an allergic reaction can include trouble breathing, skin rash, itching, swelling, or severe dizziness.    Do not use the medication any more than instructed.    Avoid smoking while on this medicine. Smoking may increase your risk for stroke, heart attack, blood clots, high blood pressure, and other diseases of the heart and blood vessels.    Tell the doctor or pharmacist if you are pregnant, planning to be pregnant, or breastfeeding.    Ask your pharmacist if this medicine can interact with any of your other medicines. Be sure to tell them about all the medicines you take.    Please tell all your doctors and dentists that you are on this medicine before they provide care.    Side Effects  The following is a list of some common side effects from this medicine. Please speak with your doctor about what you should do if you experience these or other side effects.    skin irritation where medicine is applied    If you have any of the following side effects, you may be getting too much medicine. Please contact your doctor to let them know about these side effects.    diarrhea  dizziness  nausea  rapid heartbeat  vomiting    A few people may have an allergic reaction to this medicine. Symptoms can include difficulty breathing, skin rash, itching, swelling, or severe dizziness. If you notice any of these symptoms, seek medical help quickly.    Extra  Please speak with your doctor, nurse, or pharmacist if you have any questions about this medicine.      [email or web address]  IMPORTANT NOTE: This document tells you briefly how to take your medicine, but it  does not tell you all there is to know about it. Your doctor or pharmacist may give you other documents about your medicine. Please talk to them if you have any questions. Always follow their advice. There is a more complete description of this medicine available in English. Scan this code on your smartphone or tablet or use the web address below. You can also ask your pharmacist for a printout. If you have any questions, please ask your pharmacist.   2021 [employer].      1694-6503 [employer]. All rights reserved. This information is not intended as a substitute for professional medical care. Always follow your healthcare professional's instructions.    Nicotine Chewing Gum (NICOTINE GUM - BUCCAL)  For quitting smoking.  Brand Name(s): Nicorelief, Nicorette, Thrive  Generic Name: Nicotine  Instructions  Chew the gum when you feel the urge to smoke. Chew very slowly until it tingles, then move it to the space between your cheek and gum. Keep it there until it stops tingling. When the tingle is gone, begin chewing the gum again until the tingle returns. Most of the nicotine will be gone after 30 minutes.  Do not eat or drink for 15 minutes before or during use of the gum.  Store at room temperature away from heat, light, and moisture. Do not keep in the bathroom.  Tell your doctor and pharmacist about all your medicines. Include prescription and over-the-counter medicines, vitamins, and herbal medicines.  Keep using this medicine for the full number of days that it is prescribed. Do not stop the medicine even if you start to feel better.  This medicine contains sodium. If you are on a low sodium diet, consider this as part of your total sodium diet.  If you have been told to follow a low sodium diet, speak to your doctor before using this medicine.  Do not take the medicine more than 24 times during 24 hours.  Cautions  Tell your doctor and pharmacist if you ever had an allergic reaction to a  medicine.  Do not use the medication any more than instructed.  Avoid smoking while on this medicine. Smoking may increase your risk for stroke, heart attack, blood clots, high blood pressure, and other diseases of the heart and blood vessels.  Tell the doctor or pharmacist if you are pregnant, planning to be pregnant, or breastfeeding.  Please tell all your doctors and dentists that you are on this medicine before they provide care.  Side Effects  The following is a list of some common side effects from this medicine. Please speak with your doctor about what you should do if you experience these or other side effects.  jaw pain  irritation of mouth and gum tissue  If you have any of the following side effects, you may be getting too much medicine. Please contact your doctor to let them know about these side effects.  diarrhea  dizziness  rapid heartbeat  nausea and vomiting  weakness  A few people may have an allergic reaction to this medicine. Symptoms can include difficulty breathing, skin rash, itching, swelling, or severe dizziness. If you notice any of these symptoms, seek medical help quickly.  Please speak with your doctor, nurse, or pharmacist if you have any questions about this medicine.  IMPORTANT NOTE: This document tells you briefly how to take your medicine, but it does not tell you all there is to know about it. Your doctor or pharmacist may give you other documents about your medicine. Please talk to them if you have any questions. Always follow their advice.  There is a more complete description of this medicine available in English. Scan this code on your smartphone or tablet or use the web address below. You can also ask your pharmacist for a printout. If you have any questions, please ask your pharmacist.  The display and use of this drug information is subject to Terms of Use.  More information about NICOTINE GUM - BUCCAL      Copyright(c) 2023 [employer]  Selected from data included  with permission and copyright by [employer]. This copyrighted material has been downloaded from a licensed data provider and is not for distribution, except as may be authorized by the applicable terms of use.  Conditions of Use: The information in this database is intended to supplement, not substitute for the expertise and judgment of healthcare professionals. The information is not intended to cover all possible uses, directions, precautions, drug interactions or adverse effects nor should it be construed to indicate that use of a particular drug is safe, appropriate or effective for you or anyone else. A healthcare professional should be consulted before taking any drug, changing any diet or commencing or discontinuing any course of treatment. The display and use of this drug information is subject to express Terms of Use.  Care instructions adapted under license by your healthcare professional. If you have questions about a medical condition or this instruction, always ask your healthcare professional. [employer] disclaims any warranty or liability for your use of this information.    Quitting Tobacco: Care Instructions  Quitting tobacco is much harder than simply changing a habit. Nicotine cravings make it hard to quit, but you can do it. Your doctor will help you set up the plan that best meets your needs.    You will miss the nicotine at first. You may feel short-tempered and grumpy. You may have trouble sleeping or thinking clearly. The urge to use tobacco may continue for a time.   Combining tools can raise your chances of success. You can use medicine along with counseling. And you can join a quit-tobacco program, such as the American Lung Association's Freedom from Smoking program.     Get support.  Reach out to family and friends, and find a counselor to help you quit. Join a support group, such as Nicotine Anonymous. Go to www.smokefree.gov to sign up for text messaging support.  "    Talk to your doctor or pharmacist about medicines that can help you quit.  Medicines can help with cravings and withdrawal symptoms. There are several over-the-counter choices, such as nicotine patches, gum, and lozenges.     After you quit, do not use tobacco again, not even once.  Get rid of all tobacco products and anything that reminds you of tobacco, such as ashtrays.     Avoid things that make you reach for tobacco.  Change your daily routine. Take a different route to work, or eat a meal in a different place.     Try to cut down on stress.  Find ways to calm yourself, such as taking a hot bath or doing deep breathing exercises.     Eat a healthy diet, and get regular exercise.  Having healthy habits may help you quit using tobacco.     Don't give up on quitting if you use tobacco again.  Most people quit and restart a few times before they quit for good.   Follow-up care is a key part of your treatment and safety. Be sure to make and go to all appointments, and call your doctor if you are having problems. It's also a good idea to know your test results and keep a list of the medicines you take.  Where can you learn more?  Go to https://www.[employer].net/patiented  Enter Y522 in the search box to learn more about \"Quitting Tobacco: Care Instructions.\"  Current as of: March 21, 2023               Content Version: 13.8    9817-0119 [email or web address].   Care instructions adapted under license by your healthcare professional. If you have questions about a medical condition or this instruction, always ask your healthcare professional. [employer], [employer] disclaims any warranty or liability for your use of this information.      "

## 2024-02-28 NOTE — LETTER
My COPD Action Plan     Name: Nhung Concepcion    YOB: 1990   Date: 2/28/2024    My doctor: YEN Capellan CNP   My clinic: [address] [address] [phone]  My Controller Medicine: Vilanterol/umeclidinium (Anoro Ellipta)   Dose: per instructions     My Rescue Medicine: Levalbuterol (Xopenex) inhaler   Dose: 2 puffs every 4-6 hours     My Flare Up Medicine:  TAKE CONTROLLED MEDICATION DAILY        My COPD Severity: Mild = FeV1 > 80%      Use of Oxygen: Oxygen Not Prescribed      Make sure you've had your pneumonia   vaccines.          GREEN ZONE       Doing well today    Usual level of activity and exercise  Usual amount of cough and mucus  No shortness of breath  Usual level of health (thinking clearly, sleeping well, feel like eating) Actions:    Take daily medicines  Use oxygen as prescribed  Follow regular exercise and diet plan  Avoid cigarette smoke and other irritants that harm the lungs           YELLOW ZONE          Having a bad day or flare up    Short of breath more than usual  A lot more sputum (mucus) than usual  Sputum looks yellow, green, tan, brown or bloody  More coughing or wheezing  Fever or chills  Less energy; trouble completing activities  Trouble thinking or focusing  Using quick relief inhaler or nebulizer more often  Poor sleep; symptoms wake me up  Do not feel like eating Actions:    Get plenty of rest  Take daily medicines  Use quick relief inhaler every 4 hours  If you use oxygen, call you doctor to see if you should adjust your oxygen  Do breathing exercises or other things to help you relax  Let a loved one, friend or neighbor know you are feeling worse  Call your care team if you have 2 or more symptoms.  Start taking steroids or antibiotics if directed by your care team           RED ZONE       Need medical care now    Severe shortness of breath (feel you can't breathe)  Fever, chills  Not  enough breath to do any activity  Trouble coughing up mucus, walking or talking  Blood in mucus  Frequent coughing Rescue medicines are not working  Not able to sleep because of breathing  Feel confused or drowsy  Chest pain    Actions:    Call your health care team.  If you cannot reach your care team, call 911 or go to the emergency room.        Annual Reminders:  Meet with Care Team, Flu Shot every Fall  Pharmacy: [facility] - [place], [place] - [address]

## 2024-02-28 NOTE — PROGRESS NOTES
Assessment & Plan     Nicotine dependence, uncomplicated, unspecified nicotine product type  Smoking addiction  Discussed smoking cessation options in depth today patient initially wanting Chantix.  Per patient previous report when on Chantix patient did experience severe suicidal ideation and has been recommended by her psychiatrist that she go back on that medication.  Discussed with patient that she should talk about her psychiatrist first.  I would not recommend restarting Chantix given his past history.  -Will do a trial of high-dose NicoDerm 21 mg and decreasing this slowly over time -patient may take 4 mg nicotine gum to use as needed.  -Discussed benefits of smoking cessation and also did referral to quit partner today  -Discussed risk of continuing smoking with COPD  -Resources given to patient and patient handout    - [place] Quit Partner Referral  - nicotine (NICODERM CQ) 21 MG/24HR 24 hr patch  Dispense: 30 patch; Refill: 0  - nicotine (NICODERM CQ) 14 MG/24HR 24 hr patch  Dispense: 60 patch; Refill: 0  - nicotine (NICODERM CQ) 7 MG/24HR 24 hr patch  Dispense: 30 patch; Refill: 0  - CT Chest w Contrast  - nicotine polacrilex (NICORETTE) 4 MG gum  Dispense: 240 each; Refill: 11    Chest pain on breathing  TRAN (dyspnea on exertion)  -Given patient's chest pain with exertion and dyspnea on exertion will do chest CT as well as a brief D-dimer, also checking CBC today.  Patient symptoms do not match FEV1.  -Discussed the importance of taking medications as prescribed  - CT Chest w Contrast  - D dimer, quantitative  - CBC with platelets  - D dimer, quantitative  - CBC with platelets    Prediabetes  Continue wegovy  -A1c worsened from 5.6%(11/2023) to 5.9%  -dieta nd exercise/actvoty changes  -Stop smoking discussed  - Semaglutide-Weight Management (WEGOVY) 1 MG/0.5ML pen  Dispense: 2 mL; Refill: 6     Morbid obesity (H)  With comorbidity of prediabetes  Discussed exercise today  Pt asking to increase,  "though only taking every other week - maintaining weight loss - discussed not increasing as her appetite is adequately suppressed at this time.   - Semaglutide-Weight Management (WEGOVY) 1 MG/0.5ML pen  Dispense: 2 mL; Refill: 6     Wt Readings from Last 3 Encounters:   02/28/24 95.4 kg (210 lb 6.4 oz)   11/15/23 100.7 kg (222 lb)   11/01/23 100.2 kg (220 lb 12.8 oz)     Gastroesophageal reflux disease with esophagitis without hemorrhage  Stop famotidine  Will do 1-3 month course of high dose PPI to see if this improves  - omeprazole (PRILOSEC) 40 MG DR capsule  Dispense: 90 capsule; Refill: 0  - ondansetron (ZOFRAN) 8 MG tablet  Dispense: 30 tablet; Refill: 0      Mixed simple and mucopurulent chronic bronchitis (H)  FEV 1 % predicted is 86% - Mild COPD - though patient having SOB and TRAN with low amounts of activity (getting in and out of shower, putting on clothes), SOB worsening over the last 2 weeks.     Has been non compliance with LAMA/LABA combo medication daily  And levabuterol PRN  -She feels she is in denial - has tried \"maybe 5 times\" feels \"dry\" and makes her cough and gag    Discussed medication  Tripod breathing, pursed lip breathing  Discussed smking cessation    Needing COVID booster - pt to schedule follow up  Recommend flu shot annually  Up to date on TDAP     - umeclidinium-vilanterol (ANORO ELLIPTA) 62.5-25 MCG/ACT oral inhaler  Dispense: 60 each; Refill: 3     Pulmonary emphysema, unspecified emphysema type (H)  See above  - umeclidinium-vilanterol (ANORO ELLIPTA) 62.5-25 MCG/ACT oral inhaler  Dispense: 60 each; Refill: 3  - CT Chest w Contrast  - CBC with platelets  - CBC with platelets  - SMOKING CESSATION COUNSELING, 3-10 MIN    Cold intolerance  Patient reports chronic fatigue and cold intolerance which could be due to COPD but will check TSH to rule out possible cause here.  This could also be due to low calorie intake related to Wegovy.  Discussed patient should eat at least 2 meals a day " and a minimum of 1500 lorie a day  - TSH with free T4 reflex  - TSH with free T4 reflex    Patient to schedule follow-up in 2-3 months                    Subjective   Leticia is a 34 year old, presenting for the following health issues:  Follow Up (2 months follow up from last visit.)      2/28/2024     2:25 PM   Additional Questions   Roomed by LC-LPN   Accompanied by EDWARDO     History of Present Illness       COPD:  She presents for follow up of COPD.   Overall, COPD symptoms are slightly worse since last visit. She has more than usual fatigue or shortness of breath with exertion and more than usual shortness of breath at rest.  She often coughs and does not have change in sputum. No recent fever. She can walk 2-5 blocks without stopping to rest. She can walk 3 or more flights of stairs without resting. The patient has had no ED, urgent care, or hospital admissions because of COPD since the last visit.     She eats 0-1 servings of fruits and vegetables daily.She consumes 6 sweetened beverage(s) daily.She exercises with enough effort to increase her heart rate 9 or less minutes per day.  She exercises with enough effort to increase her heart rate 3 or less days per week. She is missing 4 dose(s) of medications per week.  COPD follow-up  Got inhaler - took it 5 times since started - does make her gaga and choke/dry heave.  Feels too dry.     Gets short of breath with swiffering, stairs gets SOB    #weight loss  Taking wegovy 1mg every other week   Not tolerating exercise   Eating one meal a day - won     #PFTs done 12/7/23 - shows chronic emphysema and obstruction   mMRC - Symptoms of breathlessness - grade 2-3 - stairs, dressing when getting out of the shower.  Levabuterol and   Anoro ellipta    -Patient reports gagging when using these  -Has not used a nebulizer    #depression  Started seeing therapist again - does have SI - no plan - started therapist again last week - seeing weekly   Has been going through a lot of  "life changes      Seeing psychiatrist - cymbalta 60mg,  lamictal 150 in am and 200mg at night.      2/28/24: Currently smoking half pack per day  Quit smoking - wants the patch,   Was on chantix a couple times.    Stress level influences this smoking  Has tried Chantix - SI SE so stopped by psychiatrist  Bupropion - has had seizures with this in the past   Not currently taking nicotine replacement, not interested at this time    #ETOH: 19 months of sobriety - attending meeting - 2 times/week.        GERD: has abd pain - famotidine not helpful. Gags when swallowing things  - daily famotidine not helpful.  Hx of EGD 3/25/19 - see below                Review of Systems  Constitutional, HEENT, cardiovascular, pulmonary, gi and gu systems are negative, except as otherwise noted.      Objective    /78 (BP Location: Right arm, Patient Position: Sitting, Cuff Size: Adult Large)   Pulse 111   Temp 99.5  F (37.5  C) (Oral)   Resp 22   Ht 1.651 m (5' 5\")   Wt 95.4 kg (210 lb 6.4 oz)   LMP 02/12/2024 (Approximate)   SpO2 97%   BMI 35.01 kg/m    Body mass index is 35.01 kg/m .  Physical Exam   GENERAL: alert and no distress  NECK: no adenopathy, no asymmetry, masses, or scars  RESP: lungs clear to auscultation - no rales, rhonchi or wheezes  CV: regular rate and rhythm, normal S1 S2, no S3 or S4, no murmur, click or rub, no peripheral edema  MS: no gross musculoskeletal defects noted, no edema            Signed Electronically by: YEN Capellan CNP    "

## 2024-02-29 LAB
ANION GAP SERPL CALCULATED.3IONS-SCNC: 12 MMOL/L (ref 7–15)
BUN SERPL-MCNC: 10.2 MG/DL (ref 6–20)
CALCIUM SERPL-MCNC: 9.2 MG/DL (ref 8.6–10)
CHLORIDE SERPL-SCNC: 103 MMOL/L (ref 98–107)
CREAT SERPL-MCNC: 0.9 MG/DL (ref 0.51–0.95)
DEPRECATED HCO3 PLAS-SCNC: 23 MMOL/L (ref 22–29)
EGFRCR SERPLBLD CKD-EPI 2021: 85 ML/MIN/1.73M2
GLUCOSE SERPL-MCNC: 93 MG/DL (ref 70–99)
POTASSIUM SERPL-SCNC: 3.7 MMOL/L (ref 3.4–5.3)
SODIUM SERPL-SCNC: 138 MMOL/L (ref 135–145)
TSH SERPL DL<=0.005 MIU/L-ACNC: 1.02 UIU/ML (ref 0.3–4.2)

## 2024-02-29 NOTE — RESULT ENCOUNTER NOTE
So far so good. The D dimer gives me hint in regard to possible Pulmonary embolus. Which you don't have.. Your A1c did go up -I recommend taking your ozempic weekly again. This would be better than just increasing the dose.      Your CBC (complete blood count) which looks at your hemoglobin and other blood cell types looks good- no concerns for anemia or infection.    No evidence on chronic hypoxia in the CBC.     We will see what the rest of the results come back as.

## 2024-03-01 NOTE — RESULT ENCOUNTER NOTE
TSH normal - no thyroid disease causing fatigue or feeling of being hot    Your basic metabolic panel shows your kidney function and electrolytes (sodium and potassium) were normal.    Please let me know if you have any questions or concerns.    Thank you for trusting us with your care. It was a pleasure seeing you.  YEN Capellan CNP on 3/1/2024 at 5:16 PM

## 2024-06-24 ENCOUNTER — MYC MEDICAL ADVICE (OUTPATIENT)
Dept: FAMILY MEDICINE | Facility: CLINIC | Age: 34
End: 2024-06-24
Payer: COMMERCIAL

## 2024-06-24 DIAGNOSIS — R73.01 IMPAIRED FASTING GLUCOSE: ICD-10-CM

## 2024-06-24 DIAGNOSIS — E66.01 MORBID OBESITY (H): Primary | ICD-10-CM

## 2024-06-24 DIAGNOSIS — R73.03 PREDIABETES: ICD-10-CM

## 2024-06-25 NOTE — TELEPHONE ENCOUNTER
Tarik Kelly,   You should do back down to start starting dose given your hx of Nausea.  Can you schedule an appointment with me?  There are some things we should be checking up on. Even if it's  month or so out, Best to get something on the schedule if you can.     [facility] as [facility]    YEN Capellan CNP on 6/25/2024 at 12:02 AM

## 2024-07-13 ENCOUNTER — HEALTH MAINTENANCE LETTER (OUTPATIENT)
Age: 34
End: 2024-07-13

## 2024-08-13 ENCOUNTER — MYC MEDICAL ADVICE (OUTPATIENT)
Dept: FAMILY MEDICINE | Facility: CLINIC | Age: 34
End: 2024-08-13
Payer: COMMERCIAL

## 2024-08-13 DIAGNOSIS — E66.01 MORBID OBESITY (H): Primary | ICD-10-CM

## 2024-08-19 NOTE — TELEPHONE ENCOUNTER
Left VM for Pt to call back.  If Pt calls back please schedule with PCP in any blocked spot.    Tr Alonzo

## 2024-08-28 ENCOUNTER — HOSPITAL ENCOUNTER (OUTPATIENT)
Dept: CT IMAGING | Facility: CLINIC | Age: 34
Discharge: HOME | End: 2024-08-28
Attending: NURSE PRACTITIONER | Admitting: NURSE PRACTITIONER
Payer: COMMERCIAL

## 2024-08-28 DIAGNOSIS — R07.1 CHEST PAIN ON BREATHING: ICD-10-CM

## 2024-08-28 DIAGNOSIS — F17.200 SMOKING ADDICTION: ICD-10-CM

## 2024-08-28 DIAGNOSIS — J43.9 PULMONARY EMPHYSEMA, UNSPECIFIED EMPHYSEMA TYPE (H): ICD-10-CM

## 2024-08-28 DIAGNOSIS — R06.09 DOE (DYSPNEA ON EXERTION): ICD-10-CM

## 2024-08-28 DIAGNOSIS — J41.8 MIXED SIMPLE AND MUCOPURULENT CHRONIC BRONCHITIS (H): ICD-10-CM

## 2024-08-28 DIAGNOSIS — F17.200 NICOTINE DEPENDENCE, UNCOMPLICATED, UNSPECIFIED NICOTINE PRODUCT TYPE: ICD-10-CM

## 2024-08-28 PROCEDURE — 71260 CT THORAX DX C+: CPT

## 2024-08-28 PROCEDURE — 250N000011 HC RX IP 250 OP 636: Performed by: NURSE PRACTITIONER

## 2024-08-28 RX ORDER — IOPAMIDOL 755 MG/ML
75 INJECTION, SOLUTION INTRAVASCULAR ONCE
Status: COMPLETED | OUTPATIENT
Start: 2024-08-28 | End: 2024-08-28

## 2024-08-28 RX ADMIN — IOPAMIDOL 75 ML: 755 INJECTION, SOLUTION INTRAVENOUS at 19:38

## 2024-09-16 ENCOUNTER — VIRTUAL VISIT (OUTPATIENT)
Dept: FAMILY MEDICINE | Facility: CLINIC | Age: 34
End: 2024-09-16
Payer: COMMERCIAL

## 2024-09-16 DIAGNOSIS — J41.8 MIXED SIMPLE AND MUCOPURULENT CHRONIC BRONCHITIS (H): Primary | ICD-10-CM

## 2024-09-16 DIAGNOSIS — J43.9 PULMONARY EMPHYSEMA, UNSPECIFIED EMPHYSEMA TYPE (H): ICD-10-CM

## 2024-09-16 DIAGNOSIS — E66.01 MORBID OBESITY (H): ICD-10-CM

## 2024-09-16 DIAGNOSIS — K21.00 GASTROESOPHAGEAL REFLUX DISEASE WITH ESOPHAGITIS WITHOUT HEMORRHAGE: ICD-10-CM

## 2024-09-16 DIAGNOSIS — I25.10 CORONARY ARTERY CALCIFICATION SEEN ON CAT SCAN: ICD-10-CM

## 2024-09-16 DIAGNOSIS — R73.03 PREDIABETES: ICD-10-CM

## 2024-09-16 DIAGNOSIS — F17.200 VAPING NICOTINE DEPENDENCE, NON-TOBACCO PRODUCT: ICD-10-CM

## 2024-09-16 PROCEDURE — 99214 OFFICE O/P EST MOD 30 MIN: CPT | Mod: 95 | Performed by: NURSE PRACTITIONER

## 2024-09-16 ASSESSMENT — PATIENT HEALTH QUESTIONNAIRE - PHQ9
SUM OF ALL RESPONSES TO PHQ QUESTIONS 1-9: 5
10. IF YOU CHECKED OFF ANY PROBLEMS, HOW DIFFICULT HAVE THESE PROBLEMS MADE IT FOR YOU TO DO YOUR WORK, TAKE CARE OF THINGS AT HOME, OR GET ALONG WITH OTHER PEOPLE: NOT DIFFICULT AT ALL
SUM OF ALL RESPONSES TO PHQ QUESTIONS 1-9: 5

## 2024-09-16 NOTE — PROGRESS NOTES
"Leticia is a 34 year old who is being evaluated via a billable video visit.          Assessment & Plan     Pulmonary emphysema, unspecified emphysema type (H)  Mixed simple and mucopurulent chronic bronchitis (H)  Continue with an oral Ellipta daily  Continue with labetalol as needed  Referral placed for pulmonary -for continued assistance with vaping cessation and plan for COPD flare management    Gastroesophageal reflux disease with esophagitis without hemorrhage  Discussed restarting omeprazole at 10 to 14 days, followed by as needed Tums  Patient may take omeprazole as needed moving forward  Consider over-the-counter famotidine as needed and as this is less acid reduction and long-term side effects of chronic use  Avoid triggers    Morbid obesity (H)  Continue wegovy - restart at 0.5mg  Continue diet changes and exercise    Prediabetes  Recheck A1c  Recheck Lipids    Coronary artery calcification seen on CAT scan  Incidental finding of lung CT scan shows mild coronary calcification  Order placed for coronary CT scan for full calculation of cardiovascular risk  Patient currently not on statin medication    Vaping nicotine dependence, non-tobacco product  Currently using balloon and going through 1 disposable pen in 2 days  Has nicotine gum at home  Not interesting in quitting at this time        BMI  Estimated body mass index is 35.01 kg/m  as calculated from the following:    Height as of 2/28/24: 1.651 m (5' 5\").    Weight as of 2/28/24: 95.4 kg (210 lb 6.4 oz).   Weight management plan: Discussed healthy diet and exercise guidelines josee Wegovy          Subjective   Leticia is a 34 year old, presenting for the following health issues:  Recheck Medication (No concerns)        9/16/2024     5:23 PM   Additional Questions   Roomed by gage benites   Accompanied by Self     History of Present Illness       COPD:  She presents for follow up of COPD.   Overall, COPD symptoms are better since last visit. She has same as usual " "fatigue or shortness of breath with exertion and no shortness of breath at rest.  She sometimes coughs and does not have change in sputum. No recent fever. She can walk 1-2 miles without stopping to rest. She can walk 2 flights of stairs without resting. The patient has had no ED, urgent care, or hospital admissions because of COPD since the last visit.     Diabetes:   She presents for follow up of diabetes.    She is not checking blood glucose.        She is concerned about low blood sugar, several less than 70 in the past few weeks.   She is having excessive thirst and weight gain.            She eats 0-1 servings of fruits and vegetables daily.She consumes 6 sweetened beverage(s) daily.She exercises with enough effort to increase her heart rate 9 or less minutes per day.  She exercises with enough effort to increase her heart rate 3 or less days per week. She is missing 2 dose(s) of medications per week.  She is not taking prescribed medications regularly due to remembering to take.       Selling house and buying a house in [place] in same school district as partner    #Weight  Has not been taking wegovy - has been off for a while. Has missed doses - went back down to lower doses so less nausea - has regained weight  Not doing physical activity or have specific diet changes    #COPD:   Stopped smoking   Now vaping -disposable - lasts her two days - [employer] - started 4 months ago.   \"Doesn't leave my mouth\"  More coughing and increased phlegm production -     Mental health: Taking medications as prescribed  No changes based on previous medications  Psychiatrist every 3 months  Followed by therapist weekly  Has a new female partner      GERD: \"no symptoms, but has heartburn every once while\"- takes tums PRN -                   Review of Systems  Constitutional, HEENT, cardiovascular, pulmonary, gi and gu systems are negative, except as otherwise noted.      Objective           Vitals:  No vitals were " obtained today due to virtual visit.    Physical Exam   GENERAL: alert and no distress  EYES: Eyes grossly normal to inspection.  No discharge or erythema, or obvious scleral/conjunctival abnormalities.  RESP: No audible wheeze, cough, or visible cyanosis.    SKIN: Visible skin clear. No significant rash, abnormal pigmentation or lesions.  NEURO: Cranial nerves grossly intact.  Mentation and speech appropriate for age.  PSYCH: Appropriate affect, tone, and pace of words          Video-Visit Details    Type of service:  Video Visit   Originating Location (pt. Location): Home    Distant Location (provider location):  On-site  Platform used for Video Visit: Shane  Signed Electronically by: YEN Capellan CNP

## 2024-09-17 NOTE — Clinical Note
Future Appointments 10/30/2024 3:00 PM    [facility] PFT RM 2              [facility]              Beam 10/30/2024 4:00 PM    Glenis Blair MD          [facility]              Beam

## 2024-10-04 ENCOUNTER — MYC MEDICAL ADVICE (OUTPATIENT)
Dept: FAMILY MEDICINE | Facility: CLINIC | Age: 34
End: 2024-10-04
Payer: COMMERCIAL

## 2024-10-04 DIAGNOSIS — K21.00 GASTROESOPHAGEAL REFLUX DISEASE WITH ESOPHAGITIS WITHOUT HEMORRHAGE: ICD-10-CM

## 2024-10-04 DIAGNOSIS — E66.01 MORBID OBESITY (H): Primary | ICD-10-CM

## 2024-10-04 DIAGNOSIS — R11.0 NAUSEA: ICD-10-CM

## 2024-10-09 NOTE — TELEPHONE ENCOUNTER
Please see patient message about Zepbound. It is pended up for provider to review, also pended up Zofran.       LOV 9/16/24  Morbid obesity (H)  Continue wegovy - restart at 0.5mg  Continue diet changes and exercise

## 2024-10-10 ENCOUNTER — TELEPHONE (OUTPATIENT)
Dept: FAMILY MEDICINE | Facility: CLINIC | Age: 34
End: 2024-10-10
Payer: COMMERCIAL

## 2024-10-10 RX ORDER — ONDANSETRON 8 MG/1
8 TABLET, FILM COATED ORAL EVERY 8 HOURS PRN
Qty: 30 TABLET | Refills: 0 | Status: SHIPPED | OUTPATIENT
Start: 2024-10-10

## 2024-10-10 NOTE — TELEPHONE ENCOUNTER
Prior Authorization Retail Medication Request    Medication/Dose:   tirzepatide-Weight Management (ZEPBOUND) 2.5 MG/0.5ML prefilled pen   Sig - Route: Inject 0.5 mLs (2.5 mg) subcutaneously every 7 days.   Diagnosis and ICD code (if different than what is on RX):  Morbid obesity (H) [E66.01]  - Primary     Insurance   Primary: [employer]   Insurance ID:  [id number]     Clinic Information  Preferred routing pool for dept communication: [facility] primary clinic pool

## 2024-10-15 NOTE — TELEPHONE ENCOUNTER
Retail Pharmacy Prior Authorization Team   Phone: [phone]    PA Initiation    Medication: ZEPBOUND 2.5 MG/0.5ML SC SOAJ  Insurance Company: [employer] - Phone [phone] Fax [phone]  Pharmacy Filling the Rx: [facility] - [place], [place] - [address]  Filling Pharmacy Phone: [phone]  Filling Pharmacy Fax:    Start Date: 10/15/2024

## 2024-10-16 NOTE — TELEPHONE ENCOUNTER
PRIOR AUTHORIZATION DENIED    Medication: ZEPBOUND 2.5 MG/0.5ML SC SOAJ  Insurance Company: [employer] - Phone [phone] Fax [phone]  Denial Date: 10/16/2024  Denial Reason(s):           Appeal Information:         Patient Notified: No

## 2024-10-17 NOTE — TELEPHONE ENCOUNTER
See 10/10 LIYAH. PA denied. Ws sent to pcp to review. Will close this encounter and communicate to patient in other encounter

## 2024-10-25 ENCOUNTER — LAB (OUTPATIENT)
Dept: LAB | Facility: CLINIC | Age: 34
End: 2024-10-25
Payer: COMMERCIAL

## 2024-10-25 ENCOUNTER — HOSPITAL ENCOUNTER (OUTPATIENT)
Dept: CT IMAGING | Facility: CLINIC | Age: 34
Discharge: HOME | End: 2024-10-25
Attending: NURSE PRACTITIONER | Admitting: NURSE PRACTITIONER
Payer: COMMERCIAL

## 2024-10-25 DIAGNOSIS — K21.00 GASTROESOPHAGEAL REFLUX DISEASE WITH ESOPHAGITIS WITHOUT HEMORRHAGE: ICD-10-CM

## 2024-10-25 DIAGNOSIS — E66.01 MORBID OBESITY (H): ICD-10-CM

## 2024-10-25 DIAGNOSIS — R73.03 PREDIABETES: ICD-10-CM

## 2024-10-25 DIAGNOSIS — F17.200 VAPING NICOTINE DEPENDENCE, NON-TOBACCO PRODUCT: ICD-10-CM

## 2024-10-25 DIAGNOSIS — I25.10 CORONARY ARTERY CALCIFICATION SEEN ON CAT SCAN: ICD-10-CM

## 2024-10-25 DIAGNOSIS — Z11.59 NEED FOR HEPATITIS C SCREENING TEST: Primary | ICD-10-CM

## 2024-10-25 LAB
ALBUMIN SERPL BCG-MCNC: 4.6 G/DL (ref 3.5–5.2)
ALP SERPL-CCNC: 83 U/L (ref 40–150)
ALT SERPL W P-5'-P-CCNC: 26 U/L (ref 0–50)
ANION GAP SERPL CALCULATED.3IONS-SCNC: 13 MMOL/L (ref 7–15)
AST SERPL W P-5'-P-CCNC: 26 U/L (ref 0–45)
BILIRUB SERPL-MCNC: 0.3 MG/DL
BUN SERPL-MCNC: 9.8 MG/DL (ref 6–20)
CALCIUM SERPL-MCNC: 10 MG/DL (ref 8.8–10.4)
CHLORIDE SERPL-SCNC: 99 MMOL/L (ref 98–107)
CHOLEST SERPL-MCNC: 252 MG/DL
CREAT SERPL-MCNC: 0.86 MG/DL (ref 0.51–0.95)
CV CALCIUM SCORE AGATSTON LM: 0
CV CALCIUM SCORING AGATSON LAD: 0
CV CALCIUM SCORING AGATSTON CX: 0
CV CALCIUM SCORING AGATSTON RCA: 0
CV CALCIUM SCORING AGATSTON TOTAL: 0
EGFRCR SERPLBLD CKD-EPI 2021: 89 ML/MIN/1.73M2
EST. AVERAGE GLUCOSE BLD GHB EST-MCNC: 126 MG/DL
FASTING STATUS PATIENT QL REPORTED: ABNORMAL
FASTING STATUS PATIENT QL REPORTED: NORMAL
GLUCOSE SERPL-MCNC: 96 MG/DL (ref 70–99)
HBA1C MFR BLD: 6 % (ref 0–5.6)
HCO3 SERPL-SCNC: 24 MMOL/L (ref 22–29)
HCV AB SERPL QL IA: NONREACTIVE
HDLC SERPL-MCNC: 56 MG/DL
LDLC SERPL CALC-MCNC: 176 MG/DL
NONHDLC SERPL-MCNC: 196 MG/DL
POTASSIUM SERPL-SCNC: 4 MMOL/L (ref 3.4–5.3)
PROT SERPL-MCNC: 7.5 G/DL (ref 6.4–8.3)
SODIUM SERPL-SCNC: 136 MMOL/L (ref 135–145)
TRIGL SERPL-MCNC: 99 MG/DL

## 2024-10-25 PROCEDURE — 86803 HEPATITIS C AB TEST: CPT

## 2024-10-25 PROCEDURE — 36415 COLL VENOUS BLD VENIPUNCTURE: CPT

## 2024-10-25 PROCEDURE — 80053 COMPREHEN METABOLIC PANEL: CPT

## 2024-10-25 PROCEDURE — 80061 LIPID PANEL: CPT

## 2024-10-25 PROCEDURE — 83036 HEMOGLOBIN GLYCOSYLATED A1C: CPT

## 2024-10-25 PROCEDURE — 75571 CT HRT W/O DYE W/CA TEST: CPT | Mod: 26 | Performed by: INTERNAL MEDICINE

## 2024-10-25 PROCEDURE — 75571 CT HRT W/O DYE W/CA TEST: CPT

## 2024-10-26 LAB
C TRACH DNA SPEC QL PROBE+SIG AMP: NEGATIVE
N GONORRHOEA DNA SPEC QL NAA+PROBE: NEGATIVE

## 2024-10-29 NOTE — RESULT ENCOUNTER NOTE
Leticia,  Your LDL remains significantly elevated from previous tests.  Your LDL is 176.  This may improve after restarting on the weight loss medication however would I would recommend increasing fiber in your diet as well as decreasing saturated fats.  Please make an attempt to exercise at least 150 minutes a week as this may also help these levels.  I suggest getting this rechecked in 6 to 12 months.  If your LDL goes above 190 that is when I would start on a cholesterol medication.      At this point this is an independent risk factor for heart attack.     Your hemoglobin A1c also increased only slightly from previous draw in February to now 6.0%.  This keeps you in the prediabetes range and also will likely improve after restarting your weight loss medication    Your hepatitis C is negative    Your comprehensive metabolic panel shows your liver, kidney function, your body's fluid balance, and electrolytes were normal.      Please let me know if you have any questions or concerns.    Thank you for trusting us with your care. It was a pleasure seeing you.  YEN Capellan CNP on 10/28/2024 at 7:42 PM

## 2024-10-30 ENCOUNTER — OFFICE VISIT (OUTPATIENT)
Dept: PULMONOLOGY | Facility: CLINIC | Age: 34
End: 2024-10-30
Payer: COMMERCIAL

## 2024-10-30 VITALS — DIASTOLIC BLOOD PRESSURE: 68 MMHG | HEART RATE: 119 BPM | SYSTOLIC BLOOD PRESSURE: 132 MMHG | OXYGEN SATURATION: 97 %

## 2024-10-30 DIAGNOSIS — K21.9 GASTROESOPHAGEAL REFLUX DISEASE WITHOUT ESOPHAGITIS: ICD-10-CM

## 2024-10-30 DIAGNOSIS — K31.89 GASTRIC DYSMOTILITY: ICD-10-CM

## 2024-10-30 DIAGNOSIS — E66.01 MORBID OBESITY (H): ICD-10-CM

## 2024-10-30 DIAGNOSIS — F17.200 VAPING NICOTINE DEPENDENCE, NON-TOBACCO PRODUCT: ICD-10-CM

## 2024-10-30 DIAGNOSIS — J44.9 CHRONIC OBSTRUCTIVE PULMONARY DISEASE, UNSPECIFIED COPD TYPE (H): Primary | ICD-10-CM

## 2024-10-30 DIAGNOSIS — Z78.9 MEDICATION INTOLERANCE: ICD-10-CM

## 2024-10-30 PROCEDURE — 99205 OFFICE O/P NEW HI 60 MIN: CPT | Mod: GC | Performed by: INTERNAL MEDICINE

## 2024-10-30 PROCEDURE — G2211 COMPLEX E/M VISIT ADD ON: HCPCS | Performed by: INTERNAL MEDICINE

## 2024-10-30 PROCEDURE — 99207: CPT | Performed by: INTERNAL MEDICINE

## 2024-10-30 RX ORDER — LAMOTRIGINE 150 MG/1
TABLET ORAL
COMMUNITY
Start: 2024-08-22

## 2024-10-30 RX ORDER — LAMOTRIGINE 200 MG/1
TABLET ORAL
COMMUNITY
Start: 2024-07-23

## 2024-10-30 RX ORDER — BUDESONIDE AND FORMOTEROL FUMARATE DIHYDRATE 80; 4.5 UG/1; UG/1
2 AEROSOL RESPIRATORY (INHALATION) 2 TIMES DAILY
Qty: 10.2 G | Refills: 2 | Status: SHIPPED | OUTPATIENT
Start: 2024-10-30

## 2024-10-30 RX ORDER — FLUTICASONE FUROATE AND VILANTEROL 100; 25 UG/1; UG/1
1 POWDER RESPIRATORY (INHALATION) DAILY
Qty: 60 EACH | Refills: 2 | Status: CANCELLED | OUTPATIENT
Start: 2024-10-30

## 2024-10-30 NOTE — PROGRESS NOTES
"CCx: COPD    HPI:  Nhung Concepcion is a 34 year old with a past medical history significant for tobacco use disorder, obesity, prediabetes, hyperlipidemia, and GERD who presents to clinic today for dyspnea on exertion and abnormal PFTs.     Patient reports breathing issues starting in about 2019. Had a chronic cough for about 8-9 months. Diagnosed with bronchitis. Was on tessalon pearls, montelukast. Chronic cough resolved for awhile but then recurred. She has had intermittent shortness of breath especially with exertion since then. Cough has now mostly resolved but dyspnea persists.     Currently, patient reports dyspnea with exertion. Stairs cause her to be short of breath. Lives a sedentary lifestyle. Coughs sometimes but not a lot. Not a productive cough. Does feel chest tightness with exertion. \"It feels like I'm breathing in ice cold air if I take a deep breath - it's a burning sensation.\"    Patient had PFTs completed in December 2023. She was told by PCP that these studies were consistent with mild COPD/emphysema. She was started on Anoro Ellipta and prn levalbuterol.    Current inhalers prescribed: Anoro Ellipta 1 puff daily and Xopenex PRN.   States she rarely takes her Anoro Ellipta due to it making her cough/gag.   Does use rescue levalbuterol inhaler as needed for shortness of breath. Especially after going up stairs or exertion.     Quit smoking cigarettes in April 2024. Approx 15 pack year history. Transitioned to nicotine vaping at that time. \"I always have my vape in my mouth.\" Goes through about 1 pen per day. Increasing vape use. Has tried Chantix in the past but experienced severe suicidal ideation. Has had seizures while on bupropion. Has also tried nicotine patch and gum. Felt like she got addicted to the gum, continued it for about a year.     History of GERD symptoms. As of last PCP visit in Sept, plan was for 14 days of omeprazole therapy, followed by as needed use. She reports chronic " abdominal pain. She often gags when swallowing. She had an EGD in 3/25/19, which was normal. Gagging issues worsened after starting Wegovy. States GERD symptoms have been well managed.     Has allergies some years. Depends on the year and the season. Sometimes takes Claritin. Not on any antihistamines or nasal sprays currently.      History of obesity, prediabetes, hyperlipidemia. Normal coronary artery calcium score in Oct 2024. Was on Wegovy last spring but stopped due to intolerable side effects (very low appetite, nausea, etc). Restarted Wegovy one week ago.     ROS:  Pertinent positives alluded to in the HPI. Remainder of 10 point ROS is negative.     PMH:  Tobacco use disorder   GERD  Prediabetes  Alcohol use disorder in remission   Hyperlipidemia   Morbid obesity   Major depression  PTSD   Positive TODD     PSH:  EGD 3/25/2019    Allergies:  Occasional seasonal allergies. No known allergies.     Social Hx:  Marital status: long-time partner   Number of children: 2  Resides in a 50 yr old home, no known mold.   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   Pets: dog and cat  Smoking history: started at age 14, about 05-1 ppd, about 15 pack year history  Alcohol use: no, in recovery, sober 2 years    Recreational drug use: no   Hobbies: none, chasing kids around    Recent Travel: no    Current Meds:  Current Outpatient Medications   Medication Sig Dispense Refill    DULoxetine (CYMBALTA) 60 MG capsule Take 1 capsule (60 mg) by mouth daily take with 30mg to total 90 mg daily. 30 capsule 1    lamoTRIgine (LAMICTAL) 150 MG tablet       lamoTRIgine (LAMICTAL) 200 MG tablet       levalbuterol (XOPENEX HFA) 45 MCG/ACT inhaler Inhale 2 puffs into the lungs every 4 hours as needed for shortness of breath or wheezing 15 g 5    omeprazole (PRILOSEC) 20 MG DR capsule Take 1 capsule (20 mg) by mouth daily. 30  capsule 2    ondansetron (ZOFRAN) 8 MG tablet Take 1 tablet (8 mg) by mouth every 8 hours as needed for nausea. 30 tablet 0    Semaglutide-Weight Management (WEGOVY) 0.5 MG/0.5ML pen Inject 0.5 mg subcutaneously once a week 2 mL 0    Semaglutide-Weight Management (WEGOVY) 1 MG/0.5ML pen Inject 1 mg Subcutaneous once a week 2 mL 6    umeclidinium-vilanterol (ANORO ELLIPTA) 62.5-25 MCG/ACT oral inhaler Inhale 1 puff into the lungs daily 60 each 3    lamoTRIgine (LAMICTAL) 100 MG tablet 150mg in the morning and 200mg at night.      Semaglutide-Weight Management (WEGOVY) 0.25 MG/0.5ML pen Inject 0.25 mg Subcutaneous once a week 2 mL 0    tirzepatide-Weight Management (ZEPBOUND) 2.5 MG/0.5ML prefilled pen Inject 0.5 mLs (2.5 mg) subcutaneously every 7 days. 2 mL 1     No current facility-administered medications for this visit.     Facility-Administered Medications Ordered in Other Visits   Medication Dose Route Frequency Provider Last Rate Last Admin    benzocaine 20% (HURRICAINE/TOPEX) 20 % spray    PRN Sam Krueger MD   1 spray at 03/25/19 1255      Labs:  A1C 6.0%.     TODD positive 1/10/19:  TODD interpretation  NEG^Negative Positive Abnormal     Comment:                                   Reference range:  <1:40  NEGATIVE  1:40 - 1:80  BORDERLINE POSITIVE  >1:80 POSITIVE    TODD pattern 1 SPECKLED    TODD titer 1 1:80    TODD pattern 2 NUCLEAR DOTS    TODD titer 2 1:160       Imaging studies:  CT chest 8/28/2024:   IMPRESSION:   1.  Small left lower lobe benign nodule, unchanged. No adenopathy or effusion.  2.  Normal cardiac size. Mild coronary artery calcifications. No pericardial effusion.  3.  Cholecystectomy. Mild postoperative biliary ectasia.    CXR 11/15/23:  Negative chest.     CT chest PE 8/20/18:  IMPRESSION: No visualized pulmonary embolism or other acute findings in the chest. Mediastinal and hilar structures are within normal limits. Normal heart size. Noinfiltrates, pleural effusions or  pneumothorax. Absent gallbladder.    EGD 3/25/19:  Impression:                 - Normal examined duodenum.                             - Normal stomach. Biopsied.                             - Z-line regular, 39 cm from the incisors.   Recommendation:                             - Conitnue GERD therapy per PCP.                             - GERD diet and lifestyle changes.                             - Await biopsy results.   FINAL DIAGNOSIS:   Stomach, biopsy-   - Reactive gastropathy, mild.   - Negative for gastritis, intestinal metaplasia, gastric epithelial   dysplasia, or malignancy.   - Negative for Helicobacter-like forms on routine stained sections.     PFT's 12/7/2023  FEV1/FVC is 63% (Z-score -2.95) and is reduced.  FEV1 is 2.59L (86%)  (Z Score -1.09) predicted and is normal.  FVC is 4.09L (114%)  (Z Score +1.21) predicted and normal.  There  is not significant improvement in spirometry after a single inhaled dose of bronchodilator (FEV1 +9%, FVC +5%).  TLC is 6.63L (129%) (Z Score +2.55) predicted and is increased.  RV is 2.29L (146%) (Z Score +2.07) predicted and is increased.  DLCO is 27.02ml/min/hg (121%)  (Z Score +1.44) predicted and is normal when it is not corrected for hemoglobin.  Flow volume loops indicate obstruction.    Impression:    Full Pulmonary Function Test is abnormal.    PFTs are consistent with mild obstructive disease.  Spirometry is not consistent with reversibility.  There  is  hyperinflation.  There  is  air-trapping.  Diffusion capacity when not corrected for hemoglobin is normal.    The ATS criteria for acceptability and reproducibility was NOT met for pre/post FVC.   The ATS criteria for acceptability and reproducibility was met for TLC, DLCO.     Physical Exam:  /68 (BP Location: Left arm, Patient Position: Sitting, Cuff Size: Adult Large)   Pulse 119   LMP 09/16/2024 (Approximate)   SpO2 97%    GEN: Pleasant female, in no acute distress.   HEENT: Normocephalic,  atraumatic. Extraoccular eye movements intact. Anicteric sclera. Moist mucous membranes.   PULM: Non-labored breathing. No use of accessory muscles. Clear to ausculation bilaterally. No wheezes or crackles.   CV: Regular rate and rhythm. Normal S1, S2. No rubs, murmurs, or gallops.    EXTREMITES:  No clubbing, cyanosis, or edema.    NEURO:  Awake. Oriented to person, place, time and situation.  PSYCH: Calm. Appropriate affect, insight, judgment.      Assessment and Plan:   Nhung Concepcion is a 34 year old with a past medical history significant for tobacco use disorder, GERD, obesity, prediabetes, hyperlipidemia who presents to clinic today for dyspnea on exertion that has been ongoing since 2018.  PFTs Dec 2023 reveal mild obstruction with hyperinflation, air-trapping, and no significant reversibility. Cause of dyspnea on exertion likely COPD vs asthma vs COPD/asthma overlap.  Will transition to a different control inhaler as patient does not tolerate powder inhaler. Current high-volume nicotine vape use and cigarette history are certainly contributing to patient's lung disease. Additionally, patient's stomach appears significantly distended on several prior CT scans. Etiology of this stomach enlargement is unclear, but there is concern that delayed  gastric emptying is causing laryngopharyngeal reflux/chronic aspiration and contributing to patient's dyspnea. Will stop patient's GLP1, which was restarted last week and could worsen this gastric emptying delay, and will order gastric emptying study to further evaluate.     Mild obstructive lung disease (COPD vs asthma):  - Stop Anoro Ellipta as patient cannot tolerate dry powdered inhaler    - Start Symbicort 2 puffs BID (rinse mouth after use)   - Continue Xopenex PRN    Enlarged stomach, concern for delayed gastric emptying, GERD:    - Discontinue Wegovy due to concerns that this will worsen gastric emptying and reflux (and would interfere with gastric emptying  study)    - Gastric emptying study (to be scheduled in 2+ weeks to give time for GLP1 to be washed out)    - Referral to endocrinology weight management clinic due to obesity and delayed gastric emptying contributing to lung disease.   Nicotine use disorder:   - Urged patient to completely stop vaping   - Start nicotine replacement therapy with patches and gum (patient already has a large supply at home)   - Hypnotherapy referral placed for tobacco cessation    Follow-up with pulmonology [facility] in 1 month   Follow-up with me in 4 months     The patient was seen and discussed with attending physician Dr. Glenis Blair MD, who is in agreement with the plan outlined above.     Meri Doshi MD, PGY-3  [facility]     Physician Attestation   I, Glenis Blair MD, saw this patient and agree with the findings and plan of care as documented in the note.      Items personally reviewed/procedural attestation: vitals, labs, imaging and agree with the interpretation documented in the note, EKG and agree with the interpretation documented in the note, spirometry report and agree with the interpretation documented in the note, and I was present for and supervised the entire visit.    Glenis Blair MD  Pulmonary and Critical Care  [facility]      The longitudinal plan of care for the diagnosis(es)/condition(s) as documented were addressed during this visit. Due to the added complexity in care, I will continue to support Leticia in the subsequent management and with ongoing continuity of care.

## 2024-10-30 NOTE — PATIENT INSTRUCTIONS
- Start Symbicort 2 puffs twice daily. Rinse mouth after use.   - Continue Xopenex as needed for shortness of breath    - Stop Wegovy   - Gastric emptying study to assess stomach enlargement     - Quit smoking/vaping. Use nicotine patches/gum. Consider trying hypnotherapy.     - Follow-up with NP in 1 month     - Follow-up with Dr Blair in 4 months

## 2024-11-07 ENCOUNTER — HOSPITAL ENCOUNTER (OUTPATIENT)
Dept: NUCLEAR MEDICINE | Facility: CLINIC | Age: 34
Discharge: HOME | End: 2024-11-07
Attending: INTERNAL MEDICINE
Payer: COMMERCIAL

## 2024-11-07 DIAGNOSIS — K21.9 GASTROESOPHAGEAL REFLUX DISEASE WITHOUT ESOPHAGITIS: ICD-10-CM

## 2024-11-07 DIAGNOSIS — E66.01 MORBID OBESITY (H): ICD-10-CM

## 2024-11-07 DIAGNOSIS — J44.9 CHRONIC OBSTRUCTIVE PULMONARY DISEASE, UNSPECIFIED COPD TYPE (H): ICD-10-CM

## 2024-11-07 PROCEDURE — A9541 TC99M SULFUR COLLOID: HCPCS | Performed by: INTERNAL MEDICINE

## 2024-11-07 PROCEDURE — 343N000001 HC RX 343 MED OP 636: Performed by: INTERNAL MEDICINE

## 2024-11-07 PROCEDURE — 78264 GASTRIC EMPTYING IMG STUDY: CPT

## 2024-11-07 RX ADMIN — Medication 1 MILLICURIE: at 09:32

## 2024-11-16 DIAGNOSIS — E66.01 MORBID OBESITY (H): ICD-10-CM

## 2024-11-18 RX ORDER — SEMAGLUTIDE 0.5 MG/.5ML
INJECTION, SOLUTION SUBCUTANEOUS
Qty: 2 ML | Refills: 0 | Status: SHIPPED | OUTPATIENT
Start: 2024-11-18

## 2024-12-09 ENCOUNTER — TELEPHONE (OUTPATIENT)
Dept: PULMONOLOGY | Facility: CLINIC | Age: 34
End: 2024-12-09
Payer: COMMERCIAL

## 2024-12-11 ENCOUNTER — OFFICE VISIT (OUTPATIENT)
Dept: PULMONOLOGY | Facility: CLINIC | Age: 34
End: 2024-12-11
Attending: INTERNAL MEDICINE
Payer: COMMERCIAL

## 2024-12-11 VITALS
WEIGHT: 241 LBS | DIASTOLIC BLOOD PRESSURE: 84 MMHG | OXYGEN SATURATION: 99 % | SYSTOLIC BLOOD PRESSURE: 114 MMHG | HEART RATE: 89 BPM | BODY MASS INDEX: 40.1 KG/M2

## 2024-12-11 DIAGNOSIS — J44.1 COPD EXACERBATION (H): ICD-10-CM

## 2024-12-11 DIAGNOSIS — J44.9 CHRONIC OBSTRUCTIVE PULMONARY DISEASE, UNSPECIFIED COPD TYPE (H): ICD-10-CM

## 2024-12-11 DIAGNOSIS — K31.89 GASTRIC DYSMOTILITY: ICD-10-CM

## 2024-12-11 DIAGNOSIS — K21.9 GASTROESOPHAGEAL REFLUX DISEASE WITHOUT ESOPHAGITIS: ICD-10-CM

## 2024-12-11 DIAGNOSIS — F17.200 VAPING NICOTINE DEPENDENCE, NON-TOBACCO PRODUCT: ICD-10-CM

## 2024-12-11 DIAGNOSIS — R06.09 DYSPNEA ON EXERTION: Primary | ICD-10-CM

## 2024-12-11 PROCEDURE — 99214 OFFICE O/P EST MOD 30 MIN: CPT | Performed by: NURSE PRACTITIONER

## 2024-12-11 RX ORDER — IPRATROPIUM BROMIDE AND ALBUTEROL SULFATE 2.5; .5 MG/3ML; MG/3ML
1 SOLUTION RESPIRATORY (INHALATION) EVERY 6 HOURS PRN
Qty: 90 ML | Refills: 11 | Status: SHIPPED | OUTPATIENT
Start: 2024-12-11

## 2024-12-11 RX ORDER — PREDNISONE 20 MG/1
40 TABLET ORAL DAILY
Qty: 10 TABLET | Refills: 0 | Status: SHIPPED | OUTPATIENT
Start: 2024-12-11 | End: 2024-12-16

## 2024-12-11 RX ORDER — AZITHROMYCIN 250 MG/1
TABLET, FILM COATED ORAL
Qty: 6 TABLET | Refills: 0 | Status: SHIPPED | OUTPATIENT
Start: 2024-12-11 | End: 2024-12-16

## 2024-12-11 NOTE — PROGRESS NOTES
Pulmonary Clinic Follow-Up            Assessment/Plan:     Nhung Concepcion is a 34 year old with a past medical history significant for tobacco use disorder, GERD, obesity, prediabetes, hyperlipidemia who presents to clinic today for one month follow-up of dyspnea on exertion.    Dyspnea on exertion  Mild obstructive lung disease  Nicotine use disorder  Former smoker, quit 11/2023.  She presented to clinic last visit for evaluation of dyspnea on exertion.  Pulmonary function testing 12/2023 reveal mild obstruction with hyperinflation, air-trapping, and no significant reversibility. Current high-volume nicotine vape use and cigarette history are certainly contributing to patient's lung disease. Additionally, patient's stomach appears significantly distended on several prior CT scans. Etiology of this stomach enlargement is unclear, but there is concern that delayed gastric emptying is causing laryngopharyngeal reflux/chronic aspiration and contributing to patient's dyspnea.   Cause of dyspnea on exertion likely COPD vs asthma vs COPD/asthma overlap.    Last visit she was switched from Anoro Ellipta (unable to tolerate powder) to Symbicort.  Her Wegovy was discontinued due to concerns for delayed gastric emptying and GERD.  A gastric emptying study was also ordered, and referral to endocrinology weight management clinic.  The gastric emptying study was normal.   She is tolerating the Symbicort, it does not make her gag like Anoro.  She presents today with exacerbation symptoms.     Plan:  - start prednisone 40mg x5 days + azithromycin x5 days.   - start Duonebs.  She has a nebulizer at home, supplied her with new tubing.   - continue Symbicort (budesonide/formoterol) two puffs BID, rinse/gargle after use.   - continue Xopenex PRN    - continue to work on vaping cessation with nicotine replacement therapy.  - she is UTD with COVID booster, annual influenza, and prevnar 20.  - Action plan: prednisone 40mg x5  "days    Follow-up:  - in February with Dr Blair as planned    Trina Dietrich, CNP  Pulmonary Medicine  [facility]  [phone]         CC:     COPD     HPI:       Nhung Concepcion is a 34 year old with a past medical history significant for tobacco use disorder, GERD, obesity, prediabetes, hyperlipidemia who presents to clinic today for one month follow-up of dyspnea on exertion.    Continues to vape.   Had gastric emptying issue, test was negative.   More SOB in the last week, chest heavy, even talking having SOB.  Now having cough as well.         Previous HPI:  Patient reports breathing issues starting in about 2019. Had a chronic cough for about 8-9 months. Diagnosed with bronchitis. Was on tessalon pearls, montelukast. Chronic cough resolved for awhile but then recurred. She has had intermittent shortness of breath especially with exertion since then. Cough has now mostly resolved but dyspnea persists.   Currently, patient reports dyspnea with exertion. Stairs cause her to be short of breath. Lives a sedentary lifestyle. Coughs sometimes but not a lot. Not a productive cough. Does feel chest tightness with exertion. \"It feels like I'm breathing in ice cold air if I take a deep breath - it's a burning sensation.\"  Patient had PFTs completed in December 2023. She was told by PCP that these studies were consistent with mild COPD/emphysema. She was started on Anoro Ellipta and prn levalbuterol.    Current inhalers prescribed: Anoro Ellipta 1 puff daily and Xopenex PRN.   States she rarely takes her Anoro Ellipta due to it making her cough/gag.   Does use rescue levalbuterol inhaler as needed for shortness of breath. Especially after going up stairs or exertion.   Quit smoking cigarettes in April 2024. Approx 15 pack year history. Transitioned to nicotine vaping at that time. \"I always have my vape in my mouth.\" Goes through about 1 pen per day. Increasing vape use. Has tried " Chantix in the past but experienced severe suicidal ideation. Has had seizures while on bupropion. Has also tried nicotine patch and gum. Felt like she got addicted to the gum, continued it for about a year.   History of GERD symptoms. As of last PCP visit in Sept, plan was for 14 days of omeprazole therapy, followed by as needed use. She reports chronic abdominal pain. She often gags when swallowing. She had an EGD in 3/25/19, which was normal. Gagging issues worsened after starting Wegovy. States GERD symptoms have been well managed.   Has allergies some years. Depends on the year and the season. Sometimes takes Claritin. Not on any antihistamines or nasal sprays currently.    History of obesity, prediabetes, hyperlipidemia. Normal coronary artery calcium score in Oct 2024. Was on Wegovy last spring but stopped due to intolerable side effects (very low appetite, nausea, etc). Restarted Wegovy one week ago.      ROS:     4-point ROS performed and is negative aside from those listed in HPI.     Medical history:       PMH:  Tobacco use disorder   GERD  Prediabetes  Alcohol use disorder in remission   Hyperlipidemia   Morbid obesity   Major depression  PTSD   Positive TODD     PSH:  EGD 3/25/2019    Allergies:  Occasional seasonal allergies. No known allergies.     Social Hx:  Marital status: long-time partner   Number of children: 2  Resides in a 50 yr old home, no known mold.   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   Pets: dog and cat  Smoking history: started at age 14, about 05-1 ppd, about 15 pack year history  Alcohol use: no, in recovery, sober 2 years    Recreational drug use: no   Hobbies: none, chasing kids around    Recent Travel: no  Social History     Tobacco Use    Smoking status: Former     Current packs/day: 0.00     Types: Cigarettes     Quit date: 11/10/2023     Years since quitting:  1.0     Passive exposure: Never    Smokeless tobacco: Never   Vaping Use    Vaping status: Every Day   Substance Use Topics    Alcohol use: Not Currently    Drug use: No         Current Meds:  Current Outpatient Medications   Medication Sig Dispense Refill    azithromycin (ZITHROMAX) 250 MG tablet Take 2 tablets (500 mg) by mouth daily for 1 day, THEN 1 tablet (250 mg) daily for 4 days. 6 tablet 0    budesonide-formoterol (SYMBICORT) 80-4.5 MCG/ACT Inhaler Inhale 2 puffs into the lungs 2 times daily. 10.2 g 2    DULoxetine (CYMBALTA) 60 MG capsule Take 1 capsule (60 mg) by mouth daily take with 30mg to total 90 mg daily. 30 capsule 1    ipratropium - albuterol 0.5 mg/2.5 mg/3 mL (DUONEB) 0.5-2.5 (3) MG/3ML neb solution Take 1 vial (3 mLs) by nebulization every 6 hours as needed for shortness of breath, wheezing or cough. 90 mL 11    lamoTRIgine (LAMICTAL) 100 MG tablet 150mg in the morning and 200mg at night.      lamoTRIgine (LAMICTAL) 150 MG tablet       lamoTRIgine (LAMICTAL) 200 MG tablet       levalbuterol (XOPENEX HFA) 45 MCG/ACT inhaler Inhale 2 puffs into the lungs every 4 hours as needed for shortness of breath or wheezing 15 g 5    omeprazole (PRILOSEC) 20 MG DR capsule Take 1 capsule (20 mg) by mouth daily. 30 capsule 2    ondansetron (ZOFRAN) 8 MG tablet Take 1 tablet (8 mg) by mouth every 8 hours as needed for nausea. 30 tablet 0    predniSONE (DELTASONE) 20 MG tablet Take 2 tablets (40 mg) by mouth daily for 5 days. 10 tablet 0    WEGOVY 0.5 MG/0.5ML pen INJECT 0.5 MG (1 PEN) SUBCUTANEOUSLY EVERY WEEK 2 mL 0     No current facility-administered medications for this visit.     Facility-Administered Medications Ordered in Other Visits   Medication Dose Route Frequency Provider Last Rate Last Admin    benzocaine 20% (HURRICAINE/TOPEX) 20 % spray    PRN Sam Krueger MD   1 spray at 03/25/19 1255       Physical Exam:     /84 (BP Location: Left arm, Patient Position: Sitting, Cuff Size:  Adult Large)   Pulse 89   Wt 109.3 kg (241 lb)   SpO2 99%   BMI 40.10 kg/m    Gen: adult female, appears in NAD  HEENT: clear conjunctivae, moist mucous membranes  CV: RRR, no M/G/R  Resp: CTAB, no focal crackles or wheezes.  Respirations even and unlabored.  On RA.   Skin: no apparent rashes on visible skin  Ext: no cyanosis, clubbing or edema  Neuro: alert and answering questions appropriately     Data:       Labs:  A1C 6.0%.     TODD positive 1/10/19:  TODD interpretation  NEG^Negative Positive Abnormal     Comment:                                   Reference range:  <1:40  NEGATIVE  1:40 - 1:80  BORDERLINE POSITIVE  >1:80 POSITIVE    TODD pattern 1 SPECKLED    TODD titer 1 1:80    TODD pattern 2 NUCLEAR DOTS    TODD titer 2 1:160       Imaging studies:  CT chest 8/28/2024:   IMPRESSION:   1.  Small left lower lobe benign nodule, unchanged. No adenopathy or effusion.  2.  Normal cardiac size. Mild coronary artery calcifications. No pericardial effusion.  3.  Cholecystectomy. Mild postoperative biliary ectasia.    CXR 11/15/23:  Negative chest.     CT chest PE 8/20/18:  IMPRESSION: No visualized pulmonary embolism or other acute findings in the chest. Mediastinal and hilar structures are within normal limits. Normal heart size. Noinfiltrates, pleural effusions or pneumothorax. Absent gallbladder.    EGD 3/25/19:  Impression:                 - Normal examined duodenum.                             - Normal stomach. Biopsied.                             - Z-line regular, 39 cm from the incisors.   Recommendation:                             - Conitnue GERD therapy per PCP.                             - GERD diet and lifestyle changes.                             - Await biopsy results.   FINAL DIAGNOSIS:   Stomach, biopsy-   - Reactive gastropathy, mild.   - Negative for gastritis, intestinal metaplasia, gastric epithelial   dysplasia, or malignancy.   - Negative for Helicobacter-like forms on routine stained sections.      PFT's 12/7/2023  FEV1/FVC is 63% (Z-score -2.95) and is reduced.  FEV1 is 2.59L (86%)  (Z Score -1.09) predicted and is normal.  FVC is 4.09L (114%)  (Z Score +1.21) predicted and normal.  There  is not significant improvement in spirometry after a single inhaled dose of bronchodilator (FEV1 +9%, FVC +5%).  TLC is 6.63L (129%) (Z Score +2.55) predicted and is increased.  RV is 2.29L (146%) (Z Score +2.07) predicted and is increased.  DLCO is 27.02ml/min/hg (121%)  (Z Score +1.44) predicted and is normal when it is not corrected for hemoglobin.  Flow volume loops indicate obstruction.    Impression:    Full Pulmonary Function Test is abnormal.    PFTs are consistent with mild obstructive disease.  Spirometry is not consistent with reversibility.  There  is  hyperinflation.  There  is  air-trapping.  Diffusion capacity when not corrected for hemoglobin is normal.    The ATS criteria for acceptability and reproducibility was NOT met for pre/post FVC.   The ATS criteria for acceptability and reproducibility was met for TLC, DLCO.

## 2024-12-11 NOTE — PATIENT INSTRUCTIONS
It was a pleasure to see you in clinic today.   Here is what we discussed:    Start prednisone 40mg x5 days + azithromycin x5 days.   Start Duonebs, every 4-6 hours as needed.  Continue Symbicort two puffs twice daily, rinse/gargle after use.   Call my nurse, Amadeo ([phone]) with any change or worsening of your breathing.  Follow-up with Dr Blair in February as scheduled.     Trnia Dietrich, CNP  Pulmonary Medicine  [facility]  [phone]

## 2024-12-19 ENCOUNTER — PATIENT OUTREACH (OUTPATIENT)
Dept: CARE COORDINATION | Facility: CLINIC | Age: 34
End: 2024-12-19
Payer: COMMERCIAL

## 2024-12-21 ENCOUNTER — APPOINTMENT (OUTPATIENT)
Dept: ULTRASOUND IMAGING | Facility: CLINIC | Age: 34
End: 2024-12-21
Attending: PHYSICIAN ASSISTANT
Payer: COMMERCIAL

## 2024-12-21 ENCOUNTER — APPOINTMENT (OUTPATIENT)
Dept: RADIOLOGY | Facility: CLINIC | Age: 34
End: 2024-12-21
Attending: PHYSICIAN ASSISTANT
Payer: COMMERCIAL

## 2024-12-21 ENCOUNTER — HOSPITAL ENCOUNTER (EMERGENCY)
Facility: CLINIC | Age: 34
Discharge: HOME | End: 2024-12-21
Attending: EMERGENCY MEDICINE
Payer: COMMERCIAL

## 2024-12-21 VITALS
SYSTOLIC BLOOD PRESSURE: 113 MMHG | BODY MASS INDEX: 41.65 KG/M2 | DIASTOLIC BLOOD PRESSURE: 78 MMHG | WEIGHT: 250 LBS | HEIGHT: 65 IN | TEMPERATURE: 98.2 F | OXYGEN SATURATION: 98 % | RESPIRATION RATE: 29 BRPM | HEART RATE: 104 BPM

## 2024-12-21 DIAGNOSIS — R00.0 TACHYCARDIA: ICD-10-CM

## 2024-12-21 DIAGNOSIS — R06.02 SHORTNESS OF BREATH: ICD-10-CM

## 2024-12-21 LAB
ALBUMIN SERPL BCG-MCNC: 4.4 G/DL (ref 3.5–5.2)
ALP SERPL-CCNC: 93 U/L (ref 40–150)
ALT SERPL W P-5'-P-CCNC: 43 U/L (ref 0–50)
ANION GAP SERPL CALCULATED.3IONS-SCNC: 15 MMOL/L (ref 7–15)
AST SERPL W P-5'-P-CCNC: 31 U/L (ref 0–45)
BILIRUB DIRECT SERPL-MCNC: <0.2 MG/DL (ref 0–0.3)
BILIRUB SERPL-MCNC: <0.2 MG/DL
BUN SERPL-MCNC: 11.9 MG/DL (ref 6–20)
CALCIUM SERPL-MCNC: 9.8 MG/DL (ref 8.8–10.4)
CHLORIDE SERPL-SCNC: 101 MMOL/L (ref 98–107)
CREAT SERPL-MCNC: 0.73 MG/DL (ref 0.51–0.95)
D DIMER PPP FEU-MCNC: <=0.27 UG/ML FEU (ref 0–0.5)
EGFRCR SERPLBLD CKD-EPI 2021: >90 ML/MIN/1.73M2
ERYTHROCYTE [DISTWIDTH] IN BLOOD BY AUTOMATED COUNT: 13.7 % (ref 10–15)
ETHANOL SERPL-MCNC: <0.01 G/DL
GLUCOSE SERPL-MCNC: 144 MG/DL (ref 70–99)
HCG SERPL QL: NEGATIVE
HCO3 SERPL-SCNC: 22 MMOL/L (ref 22–29)
HCT VFR BLD AUTO: 38.3 % (ref 35–47)
HGB BLD-MCNC: 13.1 G/DL (ref 11.7–15.7)
MAGNESIUM SERPL-MCNC: 2.2 MG/DL (ref 1.7–2.3)
MCH RBC QN AUTO: 31.1 PG (ref 26.5–33)
MCHC RBC AUTO-ENTMCNC: 34.2 G/DL (ref 31.5–36.5)
MCV RBC AUTO: 91 FL (ref 78–100)
NT-PROBNP SERPL-MCNC: <36 PG/ML (ref 0–450)
PLATELET # BLD AUTO: 389 10E3/UL (ref 150–450)
POTASSIUM SERPL-SCNC: 4 MMOL/L (ref 3.4–5.3)
PROT SERPL-MCNC: 7.2 G/DL (ref 6.4–8.3)
RBC # BLD AUTO: 4.21 10E6/UL (ref 3.8–5.2)
SODIUM SERPL-SCNC: 138 MMOL/L (ref 135–145)
TROPONIN T SERPL HS-MCNC: <6 NG/L
TSH SERPL DL<=0.005 MIU/L-ACNC: 1.5 UIU/ML (ref 0.3–4.2)
WBC # BLD AUTO: 12.6 10E3/UL (ref 4–11)

## 2024-12-21 PROCEDURE — 84703 CHORIONIC GONADOTROPIN ASSAY: CPT | Performed by: PHYSICIAN ASSISTANT

## 2024-12-21 PROCEDURE — 82248 BILIRUBIN DIRECT: CPT | Performed by: PHYSICIAN ASSISTANT

## 2024-12-21 PROCEDURE — 99285 EMERGENCY DEPT VISIT HI MDM: CPT | Mod: 25

## 2024-12-21 PROCEDURE — 71046 X-RAY EXAM CHEST 2 VIEWS: CPT

## 2024-12-21 PROCEDURE — 83735 ASSAY OF MAGNESIUM: CPT | Performed by: PHYSICIAN ASSISTANT

## 2024-12-21 PROCEDURE — 85018 HEMOGLOBIN: CPT | Performed by: PHYSICIAN ASSISTANT

## 2024-12-21 PROCEDURE — 250N000011 HC RX IP 250 OP 636: Performed by: PHYSICIAN ASSISTANT

## 2024-12-21 PROCEDURE — 93971 EXTREMITY STUDY: CPT | Mod: LT

## 2024-12-21 PROCEDURE — 93005 ELECTROCARDIOGRAM TRACING: CPT | Performed by: EMERGENCY MEDICINE

## 2024-12-21 PROCEDURE — 84484 ASSAY OF TROPONIN QUANT: CPT | Performed by: PHYSICIAN ASSISTANT

## 2024-12-21 PROCEDURE — 83880 ASSAY OF NATRIURETIC PEPTIDE: CPT | Performed by: PHYSICIAN ASSISTANT

## 2024-12-21 PROCEDURE — 80048 BASIC METABOLIC PNL TOTAL CA: CPT | Performed by: PHYSICIAN ASSISTANT

## 2024-12-21 PROCEDURE — 84443 ASSAY THYROID STIM HORMONE: CPT | Performed by: PHYSICIAN ASSISTANT

## 2024-12-21 PROCEDURE — 85379 FIBRIN DEGRADATION QUANT: CPT | Performed by: PHYSICIAN ASSISTANT

## 2024-12-21 PROCEDURE — 82077 ASSAY SPEC XCP UR&BREATH IA: CPT | Performed by: PHYSICIAN ASSISTANT

## 2024-12-21 PROCEDURE — 96374 THER/PROPH/DIAG INJ IV PUSH: CPT

## 2024-12-21 PROCEDURE — 36415 COLL VENOUS BLD VENIPUNCTURE: CPT | Performed by: PHYSICIAN ASSISTANT

## 2024-12-21 RX ORDER — HYDROXYZINE HYDROCHLORIDE 10 MG/1
TABLET, FILM COATED ORAL
COMMUNITY
Start: 2024-12-17

## 2024-12-21 RX ORDER — ONDANSETRON 2 MG/ML
4 INJECTION INTRAMUSCULAR; INTRAVENOUS ONCE
Status: COMPLETED | OUTPATIENT
Start: 2024-12-21 | End: 2024-12-21

## 2024-12-21 RX ADMIN — ONDANSETRON 4 MG: 2 INJECTION, SOLUTION INTRAMUSCULAR; INTRAVENOUS at 13:04

## 2024-12-21 ASSESSMENT — COLUMBIA-SUICIDE SEVERITY RATING SCALE - C-SSRS
1. IN THE PAST MONTH, HAVE YOU WISHED YOU WERE DEAD OR WISHED YOU COULD GO TO SLEEP AND NOT WAKE UP?: NO
6. HAVE YOU EVER DONE ANYTHING, STARTED TO DO ANYTHING, OR PREPARED TO DO ANYTHING TO END YOUR LIFE?: NO
2. HAVE YOU ACTUALLY HAD ANY THOUGHTS OF KILLING YOURSELF IN THE PAST MONTH?: NO

## 2024-12-21 ASSESSMENT — ACTIVITIES OF DAILY LIVING (ADL)
ADLS_ACUITY_SCORE: 41

## 2024-12-21 NOTE — ED PROVIDER NOTES
EMERGENCY DEPARTMENT ENCOUNTER      NAME: Nhung Concepcion  AGE: 34 year old female  YOB: 1990  MRN: [id number]  EVALUATION DATE & TIME: 2024 11:18 AM    PCP: Serene Lyle    ED PROVIDER: Malu Segura PA-C      Chief Complaint   Patient presents with    Tachycardia    Shortness of Breath         FINAL IMPRESSION:  1. Tachycardia    2. Shortness of breath          ED COURSE & MEDICAL DECISION MAKIN:18 AM I introduced myself to patient, performed initial HPI and examination.   3:15 PM Discussed results and plan for discharge     34 year old female with PMH Depression, GERD, Obesity, Alcohol dependence, CAD, prediabetes, COPD, PCOS presents to the Emergency Department for evaluation of fast heart rate. Associated symptoms include: Chest heaviness, shortness of breath. Also with intermittent nonreproducible left leg tenderness.    Differential includes sinus tachycardia, SVT, Pulmonary embolism, COPD exacerbation, Pneumonia, thyroid abnormality, electrolyte derangement, and others.   Does have a history of alcohol dependence in remission, no recent ETOH use and no concern for withdrawal.    Vital signs: Afebrile. Hypertensive (166/84). Tachycardic with -130s  Exam otherwise unremarkable. No reproducible calf tenderness or swelling  EKG nonischemic, sinus tachycardia  Labs with mild leukocytosis, has been on prednisone recently which could explain this. No anemia. No electrolyte derangement, No JOSH.  TSH normal. D-dimer negative. Troponin undetectable. Magnesium negative. BNP negative. Pregnancy negative. LFT and ETOH negative.     CXR unremarkable. Ultrasound left leg without DVT.  Heart rate did improve upon recheck. Patient ambulated with stable heart rate, no hypoxia.     At this time it is unclear what is causing patient's tachycardia, but with improvement of heart rate and reassuring work up, with symptoms occurring over the past few weeks, I think patient is appropriate for  discharge with outpatient cardiology follow up.   Discussed results and plan with patient. Referral for rapid access clinic was placed. Instructed on red flags/indications to return to the emergency department. All questions answered to the best of my ability and patient is agreeable with plan.     Medical Decision Making  Obtained supplemental history:Supplemental history obtained?: Documented in chart  Reviewed external records: External records reviewed?: Documented in chart  Care impacted by chronic illness:Documented in Chart  Care significantly affected by social determinants of health:N/A  Did you consider but not order tests?: Work up considered but not performed and documented in chart, if applicable  Did you interpret images independently?: Independent interpretation of ECG and images noted in documentation, when applicable.  Consultation discussion with other provider:Did you involve another provider (consultant, , pharmacy, etc.)?: No  Discharge. No recommendations on prescription strength medication(s). I considered admission, but discharged patient after significant clinical improvement.  Not Applicable        MEDICATIONS GIVEN IN THE EMERGENCY:  Medications   ondansetron (ZOFRAN) injection 4 mg (4 mg Intravenous $Given 12/21/24 1304)       NEW PRESCRIPTIONS STARTED AT TODAY'S ER VISIT  Discharge Medication List as of 12/21/2024  3:18 PM             =================================================================    HPI    Patient information was obtained from: Patient, family member    Use of : N/A      Nhung Concepcion is a 34 year old female with a pertinent history of Depression, GERD, Obesity, Alcohol dependence, CAD, prediabetes, COPD, PCOS who presents to this ED by private car for evaluation of shortness of breath, fast heart rate.    Patient reports symptoms have been ongoing for a few weeks, -170s based on apple watch. Feels winded, chest heaviness. Intermittent  "nonproductive cough. No pleuritic pain. Has had intermittent leg pain, notes she has been more bloated/\"puffy\" lately.      Patient does have a history of COPD, vapes. Follows with pulmonology. Recent completed prednisone and azithromycin (Sunday, 6 days ago)    Reports moderate caffeine use (2 energy drinks, 5-6 sodas per day)  History of anxiety, has tried hydroxyzine without improvement of heartrate    Has had thyroid checked previously although not recently. No history of thyroid issues.    No history of blood clots in lungs/legs. Did recently travel to [place] 3-4 weeks ago.      Mother currently in ICU with pneumonia and COPD exacerbation       REVIEW OF SYSTEMS   ROS negative unless otherwise stated in HPI    PAST MEDICAL HISTORY:  Past Medical History:   Diagnosis Date    Anxiety     Depressive disorder        PAST SURGICAL HISTORY:  Past Surgical History:   Procedure Laterality Date    ESOPHAGOSCOPY, GASTROSCOPY, DUODENOSCOPY (EGD), COMBINED  03/25/2019    Dr. Krueger [facility]    ESOPHAGOSCOPY, GASTROSCOPY, DUODENOSCOPY (EGD), COMBINED N/A 3/25/2019    Procedure: ESOPHAGOSCOPY, GASTROSCOPY, DUODENOSCOPY;  Surgeon: Sam Krueger MD;  Location:  GI    ORTHOPEDIC SURGERY      right arm surgery       CURRENT MEDICATIONS:    hydrOXYzine HCl (ATARAX) 10 MG tablet  budesonide-formoterol (SYMBICORT) 80-4.5 MCG/ACT Inhaler  DULoxetine (CYMBALTA) 60 MG capsule  ipratropium - albuterol 0.5 mg/2.5 mg/3 mL (DUONEB) 0.5-2.5 (3) MG/3ML neb solution  lamoTRIgine (LAMICTAL) 100 MG tablet  lamoTRIgine (LAMICTAL) 150 MG tablet  lamoTRIgine (LAMICTAL) 200 MG tablet  levalbuterol (XOPENEX HFA) 45 MCG/ACT inhaler  omeprazole (PRILOSEC) 20 MG DR capsule  ondansetron (ZOFRAN) 8 MG tablet  WEGOVY 0.5 MG/0.5ML pen        ALLERGIES:  Allergies   Allergen Reactions    Varenicline      Other reaction(s): Nightmares    Wellbutrin [Bupropion]      History of seizure activity while treated with Wellbutrin    Sulfa " Antibiotics Rash       FAMILY HISTORY:  Family History   Problem Relation Age of Onset    Breast Cancer Maternal Grandmother     Colon Cancer No family hx of        SOCIAL HISTORY:   Social History     Socioeconomic History    Marital status:    Tobacco Use    Smoking status: Former     Current packs/day: 0.00     Types: Cigarettes     Quit date: 11/10/2023     Years since quittin.1     Passive exposure: Never    Smokeless tobacco: Never   Vaping Use    Vaping status: Every Day   Substance and Sexual Activity    Alcohol use: Not Currently    Drug use: No    Sexual activity: Yes     Partners: Female   Other Topics Concern    Parent/sibling w/ CABG, MI or angioplasty before 65F 55M? No     Social Drivers of Health     Financial Resource Strain: Low Risk  (2023)    Financial Resource Strain     Within the past 12 months, have you or your family members you live with been unable to get utilities (heat, electricity) when it was really needed?: No   Food Insecurity: Not on File (2024)    Received from [facility]    Food Insecurity     Food: 0   Transportation Needs: Low Risk  (2023)    Transportation Needs     Within the past 12 months, has lack of transportation kept you from medical appointments, getting your medicines, non-medical meetings or appointments, work, or from getting things that you need?: No   Physical Activity: Not on File (11/15/2021)    Received from [facility]    Physical Activity     Physical Activity: 0   Stress: Not on File (11/15/2021)    Received from [facility]    Stress     Stress: 0   Social Connections: Not on File (2024)    Received from [facility]    Social Connections     Connectedness: 0   Interpersonal Safety: Low Risk  (2023)    Interpersonal Safety     Do you feel physically and emotionally safe where you currently live?: Yes     Within the past 12 months, have you been hit, slapped, kicked or otherwise physically hurt by someone?: No     Within the past 12  "months, have you been humiliated or emotionally abused in other ways by your partner or ex-partner?: No   Housing Stability: Low Risk  (11/1/2023)    Housing Stability     Do you have housing? : Yes     Are you worried about losing your housing?: No       VITALS:  /78   Pulse 104   Temp 98.2  F (36.8  C) (Oral)   Resp 29   Ht 1.651 m (5' 5\")   Wt 113.4 kg (250 lb)   SpO2 98%   BMI 41.60 kg/m      PHYSICAL EXAM    Constitutional: Well developed, Well nourished, NAD, GCS 15   HENT: Normocephalic, Atraumatic, Oropharynx clear.   Neck- Supple, Nontender. Normal ROM.   Eyes: Conjunctiva normal. PERRL.   Respiratory: No respiratory distress, speaking in full sentences. Normal breath sounds, No wheezing  Cardiovascular: Tachycardic, Regular rhythm, No murmurs. Chest wall nontender. Pulses WNL   GI: Soft, nontender  Musculoskeletal: No deformities, Moves all extremities equally. No calf tenderness or swelling.  Integument: Warm, Dry, No erythema, ecchymosis, or rash.  Neurologic: Alert & oriented x 3, Normal sensory function. No focal deficits.   Psychiatric: Affect normal, Judgment normal, Mood normal. Cooperative.      LAB:  All pertinent labs reviewed and interpreted.  Results for orders placed or performed during the hospital encounter of 12/21/24   US Lower Extremity Venous Duplex Left    Impression    IMPRESSION:  No deep venous thrombosis in the left lower extremity.   Chest XR,  PA & LAT    Impression    IMPRESSION: Heart is normal in size. Lungs are clear.   D dimer quantitative   Result Value Ref Range    D-Dimer Quantitative <=0.27 0.00 - 0.50 ug/mL FEU   CBC (+ platelets, no diff)   Result Value Ref Range    WBC Count 12.6 (H) 4.0 - 11.0 10e3/uL    RBC Count 4.21 3.80 - 5.20 10e6/uL    Hemoglobin 13.1 11.7 - 15.7 g/dL    Hematocrit 38.3 35.0 - 47.0 %    MCV 91 78 - 100 fL    MCH 31.1 26.5 - 33.0 pg    MCHC 34.2 31.5 - 36.5 g/dL    RDW 13.7 10.0 - 15.0 %    Platelet Count 389 150 - 450 10e3/uL "   Basic metabolic panel   Result Value Ref Range    Sodium 138 135 - 145 mmol/L    Potassium 4.0 3.4 - 5.3 mmol/L    Chloride 101 98 - 107 mmol/L    Carbon Dioxide (CO2) 22 22 - 29 mmol/L    Anion Gap 15 7 - 15 mmol/L    Urea Nitrogen 11.9 6.0 - 20.0 mg/dL    Creatinine 0.73 0.51 - 0.95 mg/dL    GFR Estimate >90 >60 mL/min/1.73m2    Calcium 9.8 8.8 - 10.4 mg/dL    Glucose 144 (H) 70 - 99 mg/dL   TSH with free T4 reflex   Result Value Ref Range    TSH 1.50 0.30 - 4.20 uIU/mL   Result Value Ref Range    Magnesium 2.2 1.7 - 2.3 mg/dL   Result Value Ref Range    Troponin T, High Sensitivity <6 <=14 ng/L   N terminal pro BNP outpatient   Result Value Ref Range    N Terminal Pro BNP Outpatient <36 0 - 450 pg/mL   HCG QUALitative pregnancy (blood)   Result Value Ref Range    hCG Serum Qualitative Negative Negative   Hepatic function panel   Result Value Ref Range    Protein Total 7.2 6.4 - 8.3 g/dL    Albumin 4.4 3.5 - 5.2 g/dL    Bilirubin Total <0.2 <=1.2 mg/dL    Alkaline Phosphatase 93 40 - 150 U/L    AST 31 0 - 45 U/L    ALT 43 0 - 50 U/L    Bilirubin Direct <0.20 0.00 - 0.30 mg/dL   Alcohol level blood   Result Value Ref Range    Alcohol ethyl <0.01 <=0.01 g/dL       RADIOLOGY:  Reviewed all pertinent imaging. Please see official radiology report.  Chest XR,  PA & LAT   Final Result   IMPRESSION: Heart is normal in size. Lungs are clear.      US Lower Extremity Venous Duplex Left   Final Result   IMPRESSION:   No deep venous thrombosis in the left lower extremity.          EKG:    Performed at: 16:17:19    Impression: Sinus tachycardia. No STEMI    Rate: 114 BPM  ME Interval: 132 ms  QRS Interval: 78 ms  QTc Interval: 336/463 ms  ST Changes: None  Comparison: When compared with ECG 20-Jul-2020, HR has increased but otherwise unchanged.    Dr. Wellington and I have independently reviewed and interpreted the EKG(s) documented above.    PROCEDURES:   None      Malu Segura PA-C  Emergency Medicine  [facility]  [facility] EMERGENCY ROOM  [address] [address]  [phone]             Malu Segura PA-C  12/21/24 173

## 2024-12-21 NOTE — DISCHARGE INSTRUCTIONS
Your labs and imaging all look reassuring.   Follow up with cardiology as planned. I have placed a referral.  I anticipate they will want to do a cardiac monitor to watch your rhythm for a few days    Try to cut back on caffeine  Continue to monitor  Return to the emergency department if you develop any new/worsening symptoms. We would be happy to see you.

## 2024-12-21 NOTE — ED TRIAGE NOTES
Pt c/o tachycardia and SOB for the last couple of weeks. She has a dx of COPD and saw her pulmonologist and given medication. She finished the meds thi past Sunday. He reports constant SOB and tachycardia.      Triage Assessment (Adult)       Row Name 12/21/24 1120          Triage Assessment    Airway WDL WDL        Respiratory WDL    Respiratory WDL X;rhythm/pattern     Rhythm/Pattern, Respiratory dyspnea upon exertion;shortness of breath        Skin Circulation/Temperature WDL    Skin Circulation/Temperature WDL WDL        Cardiac WDL    Cardiac WDL X;rhythm     Pulse Rate & Regularity tachycardic        Peripheral/Neurovascular WDL    Peripheral Neurovascular WDL WDL        Cognitive/Neuro/Behavioral WDL    Cognitive/Neuro/Behavioral WDL WDL

## 2024-12-21 NOTE — ED PROVIDER NOTES
"Emergency Department Midlevel Supervisory Note     I had a face to face encounter with this patient seen by the Advanced Practice Provider ([facility]). I personally made/approved the management plan and take responsibility for the patient management. I personally saw patient and performed a substantive portion of the visit including all aspects of the medical decision making.     ED Course:  11:27 AM  Malu Carter PA-C staffed patient with me. I agree with their assessment and plan of management, and I will see the patient.  11:41 AM I met with the patient to introduce myself, gather additional history, perform my initial exam, and discuss the plan.     Brief HPI:     Nhung Concepcion is a 34 year old female who presents for evaluation of shortness of breath    I, Jerry Short, am serving as a scribe to document services personally performed by Steven Wellington MD, based on my observations and the provider's statements to me.   I, Steven Wellington MD attest that Jerry Short was acting in a scribe capacity, has observed my performance of the services and has documented them in accordance with my direction.    Brief Physical Exam: /78   Pulse 104   Temp 98.2  F (36.8  C) (Oral)   Resp 29   Ht 1.651 m (5' 5\")   Wt 113.4 kg (250 lb)   SpO2 98%   BMI 41.60 kg/m    Constitutional:  Alert, in no acute distress  EYES: Conjunctivae clear  HENT:  Atraumatic  Respiratory:  Respirations even, unlabored, in no acute respiratory distress  Cardiovascular: Tachycardia no murmur appreciated  GI: Soft, non-distended, non-tender  Musculoskeletal:  Moves all 4 extremities equally, grossly symmetrical strength  Integument: Warm & dry. No appreciable rash, erythema.  Neurologic:  Alert & oriented, speech clear and fluent, no focal deficits noted  Psych: Normal mood and affect       MDM:  Patient presenting to the emergency department for evaluation of shortness of breath and elevated heart rate.  History non-O2 " dependent COPD secondary to tobacco use and family history.  Follow-up with pulmonology.  Presents with escalating symptoms.  On examination noted to be tachycardic.  ECG sinus tachycardia.  She also complained of some left calf tenderness.  We initiated cardiac workup with plan for troponin D-dimer chest x-ray lower extremity ultrasound and serial assessments.    Overall patient's workup was very reassuring and she demonstrated significant clinical improvement in the emergency department.  Our plan of care is for discharge with close follow-up in outpatient basis please see [facility] note for further details.       1. Tachycardia    2. Shortness of breath        Labs and Imaging:  Results for orders placed or performed during the hospital encounter of 12/21/24   US Lower Extremity Venous Duplex Left    Impression    IMPRESSION:  No deep venous thrombosis in the left lower extremity.   Chest XR,  PA & LAT    Impression    IMPRESSION: Heart is normal in size. Lungs are clear.   D dimer quantitative   Result Value Ref Range    D-Dimer Quantitative <=0.27 0.00 - 0.50 ug/mL FEU   CBC (+ platelets, no diff)   Result Value Ref Range    WBC Count 12.6 (H) 4.0 - 11.0 10e3/uL    RBC Count 4.21 3.80 - 5.20 10e6/uL    Hemoglobin 13.1 11.7 - 15.7 g/dL    Hematocrit 38.3 35.0 - 47.0 %    MCV 91 78 - 100 fL    MCH 31.1 26.5 - 33.0 pg    MCHC 34.2 31.5 - 36.5 g/dL    RDW 13.7 10.0 - 15.0 %    Platelet Count 389 150 - 450 10e3/uL   Basic metabolic panel   Result Value Ref Range    Sodium 138 135 - 145 mmol/L    Potassium 4.0 3.4 - 5.3 mmol/L    Chloride 101 98 - 107 mmol/L    Carbon Dioxide (CO2) 22 22 - 29 mmol/L    Anion Gap 15 7 - 15 mmol/L    Urea Nitrogen 11.9 6.0 - 20.0 mg/dL    Creatinine 0.73 0.51 - 0.95 mg/dL    GFR Estimate >90 >60 mL/min/1.73m2    Calcium 9.8 8.8 - 10.4 mg/dL    Glucose 144 (H) 70 - 99 mg/dL   TSH with free T4 reflex   Result Value Ref Range    TSH 1.50 0.30 - 4.20 uIU/mL   Result Value Ref Range    Magnesium  2.2 1.7 - 2.3 mg/dL   Result Value Ref Range    Troponin T, High Sensitivity <6 <=14 ng/L   N terminal pro BNP outpatient   Result Value Ref Range    N Terminal Pro BNP Outpatient <36 0 - 450 pg/mL   HCG QUALitative pregnancy (blood)   Result Value Ref Range    hCG Serum Qualitative Negative Negative   Hepatic function panel   Result Value Ref Range    Protein Total 7.2 6.4 - 8.3 g/dL    Albumin 4.4 3.5 - 5.2 g/dL    Bilirubin Total <0.2 <=1.2 mg/dL    Alkaline Phosphatase 93 40 - 150 U/L    AST 31 0 - 45 U/L    ALT 43 0 - 50 U/L    Bilirubin Direct <0.20 0.00 - 0.30 mg/dL   Alcohol level blood   Result Value Ref Range    Alcohol ethyl <0.01 <=0.01 g/dL       I have reviewed the relevant laboratory studies above.    I independently interpreted the following imaging study(s):   X-ray negative for acute process official read pending.    EKG: I reviewed and independently interpreted the patient's EKG, with comments made as listed below. Please see scanned EKG for full report.   Sinus tachycardia    Procedures:  I was present for the key portions of procedures documented in [facility]/midlevel note, see midlevel note for further details.    Steven Wellington MD  [facility] EMERGENCY ROOM  [address] [address] [phone]       Steven Wellington MD  12/21/24 6879

## 2024-12-23 LAB
ATRIAL RATE - MUSE: 114 BPM
DIASTOLIC BLOOD PRESSURE - MUSE: 84 MMHG
INTERPRETATION ECG - MUSE: NORMAL
P AXIS - MUSE: 66 DEGREES
PR INTERVAL - MUSE: 132 MS
QRS DURATION - MUSE: 78 MS
QT - MUSE: 336 MS
QTC - MUSE: 463 MS
R AXIS - MUSE: 52 DEGREES
SYSTOLIC BLOOD PRESSURE - MUSE: 166 MMHG
T AXIS - MUSE: 60 DEGREES
VENTRICULAR RATE- MUSE: 114 BPM

## 2024-12-30 ENCOUNTER — OFFICE VISIT (OUTPATIENT)
Dept: CARDIOLOGY | Facility: CLINIC | Age: 34
End: 2024-12-30
Payer: COMMERCIAL

## 2024-12-30 VITALS
WEIGHT: 252.2 LBS | RESPIRATION RATE: 16 BRPM | DIASTOLIC BLOOD PRESSURE: 84 MMHG | HEIGHT: 65 IN | BODY MASS INDEX: 42.02 KG/M2 | SYSTOLIC BLOOD PRESSURE: 138 MMHG | HEART RATE: 96 BPM

## 2024-12-30 DIAGNOSIS — R07.2 PRECORDIAL PAIN: Primary | ICD-10-CM

## 2024-12-30 DIAGNOSIS — R00.2 PALPITATIONS: ICD-10-CM

## 2024-12-30 DIAGNOSIS — R06.02 SHORTNESS OF BREATH: ICD-10-CM

## 2024-12-30 DIAGNOSIS — R00.0 TACHYCARDIA: ICD-10-CM

## 2024-12-30 PROCEDURE — 99204 OFFICE O/P NEW MOD 45 MIN: CPT | Performed by: INTERNAL MEDICINE

## 2024-12-30 PROCEDURE — G2211 COMPLEX E/M VISIT ADD ON: HCPCS | Performed by: INTERNAL MEDICINE

## 2024-12-30 RX ORDER — ARIPIPRAZOLE 5 MG/1
1 TABLET ORAL
COMMUNITY
Start: 2024-12-17

## 2024-12-30 NOTE — PROGRESS NOTES
"      Thank you, Dr. Segura, for asking [facility] to evaluate Ms. Nhung Concepcion.      Assessment/Recommendations   Assessment:    Sinus tachycardia and lightheadedness, multifactorial, suspect the element of postviral syndrome inappropriate tachycardia, side effects of Abilify, deconditioning  Exertional dyspnea and chest discomfort, negative coronary calcium scan earlier this year  Depression anxiety  Obesity      Plan:  Zio patch for 3 days  Stress echo    Consider low-dose beta-blocker when the results of the tests available    The longitudinal plan of care for the diagnosis(es)/condition(s) as documented were addressed during this visit. Due to the added complexity in care, I will continue to support Leticia in the subsequent management and with ongoing continuity of care.      History of Present Illness    Ms. Nhung Concepcion is a 34 year old female who comes in for cardiac evaluation.  She reports that she been having more problems with shortness of breath and elevated heart rate in the last several weeks.  She denies syncope or near syncope.  She brought her Apple Watch heart rate readings which show that her heart rate has been elevated during waking hours.  She gets short of breath fairly easily.  She denies PND and orthopnea.  She has had about 60 pounds weight gain in the last few months.  She was taking Wegovy but she stopped taking it due to GI side effects in spring 2024.    She had prolonged symptoms of COVID 2 to 3 years ago.  She was not hospitalized.    ECG: Personally reviewed.  Sinus tachycardia otherwise normal.         Physical Examination Review of Systems   /84 (BP Location: Right arm, Patient Position: Sitting, Cuff Size: Adult Large)   Pulse 96   Resp 16   Ht 1.651 m (5' 5\")   Wt 114.4 kg (252 lb 3.2 oz)   BMI 41.97 kg/m    Body mass index is 41.97 kg/m .  Wt Readings from Last 3 Encounters:   12/30/24 114.4 kg (252 lb 3.2 oz)   12/21/24 113.4 kg (250 lb) " "  12/11/24 109.3 kg (241 lb)     General Appearance:   Alert, cooperative, no distress, appears stated age   Head/ENT: Normocephalic, without obvious abnormality. Membranes moist      EYES:  no scleral icterus, normal conjunctivae   Neck: Supple, symmetrical, trachea midline, no adenopathy, thyroid: not enlarged, symmetric, no carotid bruit or JVD   Chest/Lungs:   Lungs are clear to auscultation, respirations unlabored. No tenderness or deformity    Cardiovascular:   Regular rhythm, S1, S2 normal, no murmur, rub or gallop.   Abdomen:  Soft, non-tender, bowel sounds active all four quadrants,  no masses, no organomegaly   Extremities: no cyanosis or clubbing. No edema   Skin: Skin color, texture, turgor normal, no rashes or lesions.    Psychiatric: Normal affect, calm   Neurologic: Alert and oriented x 3, moving all four extremities.     Encounter Vitals  BP: 138/84  Pulse: 96  Resp: 16  Weight: 114.4 kg (252 lb 3.2 oz)  Height: 165.1 cm (5' 5\")                                          Lab Results    Chemistry/lipid CBC Cardiac Enzymes/BNP/TSH/INR   Recent Labs   Lab Test 10/25/24  1543   CHOL 252*   HDL 56   *   TRIG 99     Recent Labs   Lab Test 10/25/24  1543 11/01/23  1159 02/27/23  1742   * 122* 121*     Recent Labs   Lab Test 12/21/24  1208      POTASSIUM 4.0   CHLORIDE 101   CO2 22   *   BUN 11.9   CR 0.73   GFRESTIMATED >90   ALVA 9.8     Recent Labs   Lab Test 12/21/24  1208 10/25/24  1543 02/28/24  1530   CR 0.73 0.86 0.90     Recent Labs   Lab Test 10/25/24  1543 02/28/24  1530 11/01/23  1159   A1C 6.0* 5.9* 5.6          Recent Labs   Lab Test 12/21/24  1208   WBC 12.6*   HGB 13.1   HCT 38.3   MCV 91        Recent Labs   Lab Test 12/21/24  1208 02/28/24  1530 11/01/23  1159   HGB 13.1 12.8 13.3    Recent Labs   Lab Test 07/20/20  1627   TROPONINI <0.01     Recent Labs   Lab Test 12/21/24  1208   NTBNP <36     Recent Labs   Lab Test 12/21/24  1208   TSH 1.50     No results " "for input(s): \"INR\" in the last 30528 hours.     Medical History  Surgical History Family History Social History   Past Medical History:   Diagnosis Date    Anxiety     Depressive disorder      Past Surgical History:   Procedure Laterality Date    ESOPHAGOSCOPY, GASTROSCOPY, DUODENOSCOPY (EGD), COMBINED  2019    Dr. Krueger [facility]    ESOPHAGOSCOPY, GASTROSCOPY, DUODENOSCOPY (EGD), COMBINED N/A 3/25/2019    Procedure: ESOPHAGOSCOPY, GASTROSCOPY, DUODENOSCOPY;  Surgeon: Sam Krueger MD;  Location:  GI    ORTHOPEDIC SURGERY      right arm surgery     No premature CAD, SCD,cardiomyopathy   Social History     Socioeconomic History    Marital status:      Spouse name: Not on file    Number of children: Not on file    Years of education: Not on file    Highest education level: Not on file   Occupational History    Not on file   Tobacco Use    Smoking status: Former     Current packs/day: 0.00     Types: Cigarettes     Quit date: 11/10/2023     Years since quittin.1     Passive exposure: Never    Smokeless tobacco: Never   Vaping Use    Vaping status: Every Day    Substances: Nicotine    Devices: Disposable, Pre-filled or refillable cartridge   Substance and Sexual Activity    Alcohol use: Not Currently    Drug use: No    Sexual activity: Yes     Partners: Female   Other Topics Concern    Parent/sibling w/ CABG, MI or angioplasty before 65F 55M? No   Social History Narrative    Not on file     Social Drivers of Health     Financial Resource Strain: Low Risk  (2023)    Financial Resource Strain     Within the past 12 months, have you or your family members you live with been unable to get utilities (heat, electricity) when it was really needed?: No   Food Insecurity: Not on File (2024)    Received from [employer]    Food Insecurity     Food: 0   Transportation Needs: Low Risk  (2023)    Transportation Needs     Within the past 12 months, has lack of transportation kept you from " medical appointments, getting your medicines, non-medical meetings or appointments, work, or from getting things that you need?: No   Physical Activity: Not on File (11/15/2021)    Received from [facility]    Physical Activity     Physical Activity: 0   Stress: Not on File (11/15/2021)    Received from [facility]    Stress     Stress: 0   Social Connections: Not on File (9/12/2024)    Received from [facility]    Social Connections     Connectedness: 0   Interpersonal Safety: Low Risk  (11/1/2023)    Interpersonal Safety     Do you feel physically and emotionally safe where you currently live?: Yes     Within the past 12 months, have you been hit, slapped, kicked or otherwise physically hurt by someone?: No     Within the past 12 months, have you been humiliated or emotionally abused in other ways by your partner or ex-partner?: No   Housing Stability: Low Risk  (11/1/2023)    Housing Stability     Do you have housing? : Yes     Are you worried about losing your housing?: No         Medications  Allergies   Current Outpatient Medications   Medication Sig Dispense Refill    ARIPiprazole (ABILIFY) 5 MG tablet Take 1 tablet by mouth daily at 2 pm.      budesonide-formoterol (SYMBICORT) 80-4.5 MCG/ACT Inhaler Inhale 2 puffs into the lungs 2 times daily. 10.2 g 2    DULoxetine (CYMBALTA) 60 MG capsule Take 1 capsule (60 mg) by mouth daily take with 30mg to total 90 mg daily. (Patient taking differently: Take 120 mg by mouth daily.) 30 capsule 1    hydrOXYzine HCl (ATARAX) 10 MG tablet TAKE ONE TABLET BY MOUTH EVERY DAY AT BEDTIME AS NEEDED FOR SLEEP      ipratropium - albuterol 0.5 mg/2.5 mg/3 mL (DUONEB) 0.5-2.5 (3) MG/3ML neb solution Take 1 vial (3 mLs) by nebulization every 6 hours as needed for shortness of breath, wheezing or cough. 90 mL 11    lamoTRIgine (LAMICTAL) 150 MG tablet Take 150 mg by mouth every morning.      lamoTRIgine (LAMICTAL) 200 MG tablet Take 200 mg by mouth at bedtime.      levalbuterol (XOPENEX  HFA) 45 MCG/ACT inhaler Inhale 2 puffs into the lungs every 4 hours as needed for shortness of breath or wheezing 15 g 5    omeprazole (PRILOSEC) 20 MG DR capsule Take 1 capsule (20 mg) by mouth daily. 30 capsule 2    ondansetron (ZOFRAN) 8 MG tablet Take 1 tablet (8 mg) by mouth every 8 hours as needed for nausea. 30 tablet 0    WEGOVY 0.5 MG/0.5ML pen INJECT 0.5 MG (1 PEN) SUBCUTANEOUSLY EVERY WEEK 2 mL 0        Allergies   Allergen Reactions    Varenicline      Other reaction(s): Nightmares    Wellbutrin [Bupropion]      History of seizure activity while treated with Wellbutrin    Sulfa Antibiotics Rash

## 2024-12-30 NOTE — LETTER
12/30/2024    Serene Lyle, APRN CNP  [address] [facility] [address] [address]    RE: Nhung Concepcion       Dear Colleague,     I had the pleasure of seeing Nhung Concepcion in the [facility].        Thank you, Dr. Segura, for asking [facility] to evaluate Ms. Nhung Concepcion.      Assessment/Recommendations   Assessment:    Sinus tachycardia and lightheadedness, multifactorial, suspect the element of postviral syndrome inappropriate tachycardia, side effects of Abilify, deconditioning  Exertional dyspnea and chest discomfort, negative coronary calcium scan earlier this year  Depression anxiety  Obesity      Plan:  Zio patch for 3 days  Stress echo    Consider low-dose beta-blocker when the results of the tests available    The longitudinal plan of care for the diagnosis(es)/condition(s) as documented were addressed during this visit. Due to the added complexity in care, I will continue to support Leticia in the subsequent management and with ongoing continuity of care.      History of Present Illness    Ms. Nhung Concepcion is a 34 year old female who comes in for cardiac evaluation.  She reports that she been having more problems with shortness of breath and elevated heart rate in the last several weeks.  She denies syncope or near syncope.  She brought her Apple Watch heart rate readings which show that her heart rate has been elevated during waking hours.  She gets short of breath fairly easily.  She denies PND and orthopnea.  She has had about 60 pounds weight gain in the last few months.  She was taking Wegovy but she stopped taking it due to GI side effects in spring 2024.    She had prolonged symptoms of COVID 2 to 3 years ago.  She was not hospitalized.    ECG: Personally reviewed.  Sinus tachycardia otherwise normal.         Physical Examination Review of Systems   /84 (BP Location: Right arm, Patient Position: Sitting, Cuff Size: Adult Large)   Pulse 96   Resp 16   " Ht 1.651 m (5' 5\")   Wt 114.4 kg (252 lb 3.2 oz)   BMI 41.97 kg/m    Body mass index is 41.97 kg/m .  Wt Readings from Last 3 Encounters:   12/30/24 114.4 kg (252 lb 3.2 oz)   12/21/24 113.4 kg (250 lb)   12/11/24 109.3 kg (241 lb)     General Appearance:   Alert, cooperative, no distress, appears stated age   Head/ENT: Normocephalic, without obvious abnormality. Membranes moist      EYES:  no scleral icterus, normal conjunctivae   Neck: Supple, symmetrical, trachea midline, no adenopathy, thyroid: not enlarged, symmetric, no carotid bruit or JVD   Chest/Lungs:   Lungs are clear to auscultation, respirations unlabored. No tenderness or deformity    Cardiovascular:   Regular rhythm, S1, S2 normal, no murmur, rub or gallop.   Abdomen:  Soft, non-tender, bowel sounds active all four quadrants,  no masses, no organomegaly   Extremities: no cyanosis or clubbing. No edema   Skin: Skin color, texture, turgor normal, no rashes or lesions.    Psychiatric: Normal affect, calm   Neurologic: Alert and oriented x 3, moving all four extremities.     Encounter Vitals  BP: 138/84  Pulse: 96  Resp: 16  Weight: 114.4 kg (252 lb 3.2 oz)  Height: 165.1 cm (5' 5\")                                          Lab Results    Chemistry/lipid CBC Cardiac Enzymes/BNP/TSH/INR   Recent Labs   Lab Test 10/25/24  1543   CHOL 252*   HDL 56   *   TRIG 99     Recent Labs   Lab Test 10/25/24  1543 11/01/23  1159 02/27/23  1742   * 122* 121*     Recent Labs   Lab Test 12/21/24  1208      POTASSIUM 4.0   CHLORIDE 101   CO2 22   *   BUN 11.9   CR 0.73   GFRESTIMATED >90   ALVA 9.8     Recent Labs   Lab Test 12/21/24  1208 10/25/24  1543 02/28/24  1530   CR 0.73 0.86 0.90     Recent Labs   Lab Test 10/25/24  1543 02/28/24  1530 11/01/23  1159   A1C 6.0* 5.9* 5.6          Recent Labs   Lab Test 12/21/24  1208   WBC 12.6*   HGB 13.1   HCT 38.3   MCV 91        Recent Labs   Lab Test 12/21/24  1208 02/28/24  1530 " "23  1159   HGB 13.1 12.8 13.3    Recent Labs   Lab Test 20  1627   TROPONINI <0.01     Recent Labs   Lab Test 24  1208   NTBNP <36     Recent Labs   Lab Test 24  1208   TSH 1.50     No results for input(s): \"INR\" in the last 57168 hours.     Medical History  Surgical History Family History Social History   Past Medical History:   Diagnosis Date     Anxiety      Depressive disorder      Past Surgical History:   Procedure Laterality Date     ESOPHAGOSCOPY, GASTROSCOPY, DUODENOSCOPY (EGD), COMBINED  2019    Dr. Krueger [facility]     ESOPHAGOSCOPY, GASTROSCOPY, DUODENOSCOPY (EGD), COMBINED N/A 3/25/2019    Procedure: ESOPHAGOSCOPY, GASTROSCOPY, DUODENOSCOPY;  Surgeon: Sam Krueger MD;  Location:  GI     ORTHOPEDIC SURGERY      right arm surgery     No premature CAD, SCD,cardiomyopathy   Social History     Socioeconomic History     Marital status:      Spouse name: Not on file     Number of children: Not on file     Years of education: Not on file     Highest education level: Not on file   Occupational History     Not on file   Tobacco Use     Smoking status: Former     Current packs/day: 0.00     Types: Cigarettes     Quit date: 11/10/2023     Years since quittin.1     Passive exposure: Never     Smokeless tobacco: Never   Vaping Use     Vaping status: Every Day     Substances: Nicotine     Devices: Disposable, Pre-filled or refillable cartridge   Substance and Sexual Activity     Alcohol use: Not Currently     Drug use: No     Sexual activity: Yes     Partners: Female   Other Topics Concern     Parent/sibling w/ CABG, MI or angioplasty before 65F 55M? No   Social History Narrative     Not on file     Social Drivers of Health     Financial Resource Strain: Low Risk  (2023)    Financial Resource Strain      Within the past 12 months, have you or your family members you live with been unable to get utilities (heat, electricity) when it was really needed?: No "   Food Insecurity: Not on File (9/26/2024)    Received from [employer]    Food Insecurity      Food: 0   Transportation Needs: Low Risk  (11/1/2023)    Transportation Needs      Within the past 12 months, has lack of transportation kept you from medical appointments, getting your medicines, non-medical meetings or appointments, work, or from getting things that you need?: No   Physical Activity: Not on File (11/15/2021)    Received from [employer]    Physical Activity      Physical Activity: 0   Stress: Not on File (11/15/2021)    Received from [employer]    Stress      Stress: 0   Social Connections: Not on File (9/12/2024)    Received from [employer]    Social Connections      Connectedness: 0   Interpersonal Safety: Low Risk  (11/1/2023)    Interpersonal Safety      Do you feel physically and emotionally safe where you currently live?: Yes      Within the past 12 months, have you been hit, slapped, kicked or otherwise physically hurt by someone?: No      Within the past 12 months, have you been humiliated or emotionally abused in other ways by your partner or ex-partner?: No   Housing Stability: Low Risk  (11/1/2023)    Housing Stability      Do you have housing? : Yes      Are you worried about losing your housing?: No         Medications  Allergies   Current Outpatient Medications   Medication Sig Dispense Refill     ARIPiprazole (ABILIFY) 5 MG tablet Take 1 tablet by mouth daily at 2 pm.       budesonide-formoterol (SYMBICORT) 80-4.5 MCG/ACT Inhaler Inhale 2 puffs into the lungs 2 times daily. 10.2 g 2     DULoxetine (CYMBALTA) 60 MG capsule Take 1 capsule (60 mg) by mouth daily take with 30mg to total 90 mg daily. (Patient taking differently: Take 120 mg by mouth daily.) 30 capsule 1     hydrOXYzine HCl (ATARAX) 10 MG tablet TAKE ONE TABLET BY MOUTH EVERY DAY AT BEDTIME AS NEEDED FOR SLEEP       ipratropium - albuterol 0.5 mg/2.5 mg/3 mL (DUONEB) 0.5-2.5 (3) MG/3ML neb solution Take 1 vial (3 mLs) by nebulization  every 6 hours as needed for shortness of breath, wheezing or cough. 90 mL 11     lamoTRIgine (LAMICTAL) 150 MG tablet Take 150 mg by mouth every morning.       lamoTRIgine (LAMICTAL) 200 MG tablet Take 200 mg by mouth at bedtime.       levalbuterol (XOPENEX HFA) 45 MCG/ACT inhaler Inhale 2 puffs into the lungs every 4 hours as needed for shortness of breath or wheezing 15 g 5     omeprazole (PRILOSEC) 20 MG DR capsule Take 1 capsule (20 mg) by mouth daily. 30 capsule 2     ondansetron (ZOFRAN) 8 MG tablet Take 1 tablet (8 mg) by mouth every 8 hours as needed for nausea. 30 tablet 0     WEGOVY 0.5 MG/0.5ML pen INJECT 0.5 MG (1 PEN) SUBCUTANEOUSLY EVERY WEEK 2 mL 0        Allergies   Allergen Reactions     Varenicline      Other reaction(s): Nightmares     Wellbutrin [Bupropion]      History of seizure activity while treated with Wellbutrin     Sulfa Antibiotics Rash                        Thank you for allowing me to participate in the care of your patient.      Sincerely,     Tai Lazaro MD     [facility]  cc:   Malu Segura PA-C  [address] [place] [id number]

## 2025-01-03 ENCOUNTER — HOSPITAL ENCOUNTER (OUTPATIENT)
Dept: CARDIOLOGY | Facility: CLINIC | Age: 35
Discharge: HOME | End: 2025-01-03
Attending: INTERNAL MEDICINE | Admitting: INTERNAL MEDICINE
Payer: COMMERCIAL

## 2025-01-03 DIAGNOSIS — R07.2 PRECORDIAL PAIN: ICD-10-CM

## 2025-01-03 LAB
CV STRESS CURRENT BP HE: NORMAL
CV STRESS CURRENT HR HE: 105
CV STRESS CURRENT HR HE: 106
CV STRESS CURRENT HR HE: 107
CV STRESS CURRENT HR HE: 109
CV STRESS CURRENT HR HE: 109
CV STRESS CURRENT HR HE: 112
CV STRESS CURRENT HR HE: 117
CV STRESS CURRENT HR HE: 120
CV STRESS CURRENT HR HE: 123
CV STRESS CURRENT HR HE: 128
CV STRESS CURRENT HR HE: 130
CV STRESS CURRENT HR HE: 138
CV STRESS CURRENT HR HE: 139
CV STRESS CURRENT HR HE: 147
CV STRESS CURRENT HR HE: 154
CV STRESS CURRENT HR HE: 155
CV STRESS CURRENT HR HE: 165
CV STRESS CURRENT HR HE: 167
CV STRESS DEVIATION TIME HE: NORMAL
CV STRESS ECHO PERCENT HR HE: NORMAL
CV STRESS EXERCISE STAGE HE: NORMAL
CV STRESS EXERCISE STAGE REACHED HE: NORMAL
CV STRESS FINAL RESTING BP HE: NORMAL
CV STRESS FINAL RESTING HR HE: 105
CV STRESS MAX HR HE: 168
CV STRESS MAX TREADMILL GRADE HE: 14
CV STRESS MAX TREADMILL SPEED HE: 3.4
CV STRESS PEAK DIA BP HE: NORMAL
CV STRESS PEAK SYS BP HE: NORMAL
CV STRESS PHASE HE: NORMAL
CV STRESS PROTOCOL HE: NORMAL
CV STRESS REASON STOPPED HE: NORMAL
CV STRESS RESTING PT POSITION HE: NORMAL
CV STRESS RESTING PT POSITION HE: NORMAL
CV STRESS ST DEVIATION AMOUNT HE: NORMAL
CV STRESS ST DEVIATION ELEVATION HE: NORMAL
CV STRESS ST EVELATION AMOUNT HE: NORMAL
CV STRESS SYMPTOMS HE: NORMAL
CV STRESS TEST TYPE HE: NORMAL
CV STRESS TOTAL STAGE TIME MIN 1 HE: NORMAL
STRESS ECHO BASELINE DIASTOLIC HE: 90
STRESS ECHO BASELINE HR: 100
STRESS ECHO BASELINE SYSTOLIC BP: 153
STRESS ECHO LAST STRESS DIASTOLIC BP: 86
STRESS ECHO LAST STRESS HR: 167
STRESS ECHO LAST STRESS SYSTOLIC BP: 156
STRESS ECHO POST ESTIMATED WORKLOAD: 8
STRESS ECHO POST EXERCISE DUR MIN: 6
STRESS ECHO POST EXERCISE DUR SEC: 40
STRESS ECHO TARGET HR: 156

## 2025-01-03 PROCEDURE — 93016 CV STRESS TEST SUPVJ ONLY: CPT | Performed by: INTERNAL MEDICINE

## 2025-01-03 PROCEDURE — 93325 DOPPLER ECHO COLOR FLOW MAPG: CPT | Mod: 26 | Performed by: INTERNAL MEDICINE

## 2025-01-03 PROCEDURE — 93325 DOPPLER ECHO COLOR FLOW MAPG: CPT | Mod: TC

## 2025-01-03 PROCEDURE — 255N000002 HC RX 255 OP 636: Performed by: INTERNAL MEDICINE

## 2025-01-03 PROCEDURE — C8928 TTE W OR W/O FOL W/CON,STRES: HCPCS

## 2025-01-03 PROCEDURE — 93350 STRESS TTE ONLY: CPT | Mod: 26 | Performed by: INTERNAL MEDICINE

## 2025-01-03 PROCEDURE — 93321 DOPPLER ECHO F-UP/LMTD STD: CPT | Mod: 26 | Performed by: INTERNAL MEDICINE

## 2025-01-03 PROCEDURE — 93018 CV STRESS TEST I&R ONLY: CPT | Performed by: INTERNAL MEDICINE

## 2025-01-03 RX ADMIN — PERFLUTREN 3 ML: 6.52 INJECTION, SUSPENSION INTRAVENOUS at 09:39

## 2025-02-03 ENCOUNTER — VIRTUAL VISIT (OUTPATIENT)
Dept: FAMILY MEDICINE | Facility: CLINIC | Age: 35
End: 2025-02-03
Payer: COMMERCIAL

## 2025-02-03 ENCOUNTER — TELEPHONE (OUTPATIENT)
Dept: FAMILY MEDICINE | Facility: CLINIC | Age: 35
End: 2025-02-03

## 2025-02-03 DIAGNOSIS — R73.03 PREDIABETES: Primary | ICD-10-CM

## 2025-02-03 DIAGNOSIS — E66.01 MORBID OBESITY (H): ICD-10-CM

## 2025-02-03 DIAGNOSIS — F33.1 MAJOR DEPRESSIVE DISORDER, RECURRENT EPISODE, MODERATE (H): ICD-10-CM

## 2025-02-03 DIAGNOSIS — E78.2 MIXED HYPERLIPIDEMIA: ICD-10-CM

## 2025-02-03 DIAGNOSIS — J43.9 PULMONARY EMPHYSEMA, UNSPECIFIED EMPHYSEMA TYPE (H): ICD-10-CM

## 2025-02-03 DIAGNOSIS — F33.1 MODERATE EPISODE OF RECURRENT MAJOR DEPRESSIVE DISORDER (H): ICD-10-CM

## 2025-02-03 DIAGNOSIS — F10.20 ALCOHOLISM (H): ICD-10-CM

## 2025-02-03 DIAGNOSIS — J41.8 MIXED SIMPLE AND MUCOPURULENT CHRONIC BRONCHITIS (H): ICD-10-CM

## 2025-02-03 PROCEDURE — 98005 SYNCH AUDIO-VIDEO EST LOW 20: CPT | Performed by: NURSE PRACTITIONER

## 2025-02-03 RX ORDER — LEVALBUTEROL TARTRATE 45 UG/1
2 AEROSOL, METERED ORAL EVERY 4 HOURS PRN
Qty: 15 G | Refills: 5 | Status: SHIPPED | OUTPATIENT
Start: 2025-02-03

## 2025-02-03 RX ORDER — DULOXETIN HYDROCHLORIDE 60 MG/1
120 CAPSULE, DELAYED RELEASE ORAL DAILY
COMMUNITY
Start: 2025-02-03

## 2025-02-03 ASSESSMENT — ANXIETY QUESTIONNAIRES
GAD7 TOTAL SCORE: 0
7. FEELING AFRAID AS IF SOMETHING AWFUL MIGHT HAPPEN: NOT AT ALL
3. WORRYING TOO MUCH ABOUT DIFFERENT THINGS: NOT AT ALL
2. NOT BEING ABLE TO STOP OR CONTROL WORRYING: NOT AT ALL
1. FEELING NERVOUS, ANXIOUS, OR ON EDGE: NOT AT ALL
5. BEING SO RESTLESS THAT IT IS HARD TO SIT STILL: NOT AT ALL
6. BECOMING EASILY ANNOYED OR IRRITABLE: NOT AT ALL
GAD7 TOTAL SCORE: 0
4. TROUBLE RELAXING: NOT AT ALL
GAD7 TOTAL SCORE: 0
IF YOU CHECKED OFF ANY PROBLEMS ON THIS QUESTIONNAIRE, HOW DIFFICULT HAVE THESE PROBLEMS MADE IT FOR YOU TO DO YOUR WORK, TAKE CARE OF THINGS AT HOME, OR GET ALONG WITH OTHER PEOPLE: NOT DIFFICULT AT ALL
8. IF YOU CHECKED OFF ANY PROBLEMS, HOW DIFFICULT HAVE THESE MADE IT FOR YOU TO DO YOUR WORK, TAKE CARE OF THINGS AT HOME, OR GET ALONG WITH OTHER PEOPLE?: NOT DIFFICULT AT ALL
7. FEELING AFRAID AS IF SOMETHING AWFUL MIGHT HAPPEN: NOT AT ALL

## 2025-02-03 ASSESSMENT — PATIENT HEALTH QUESTIONNAIRE - PHQ9
SUM OF ALL RESPONSES TO PHQ QUESTIONS 1-9: 1
SUM OF ALL RESPONSES TO PHQ QUESTIONS 1-9: 1
10. IF YOU CHECKED OFF ANY PROBLEMS, HOW DIFFICULT HAVE THESE PROBLEMS MADE IT FOR YOU TO DO YOUR WORK, TAKE CARE OF THINGS AT HOME, OR GET ALONG WITH OTHER PEOPLE: NOT DIFFICULT AT ALL

## 2025-02-03 NOTE — PROGRESS NOTES
Leticia is a 35 year old who is being evaluated via a billable video visit.    How would you like to obtain your AVS? MyChart  If the video visit is dropped, the invitation should be resent by: Text to cell phone: [phone]  Will anyone else be joining your video visit? No      Assessment & Plan     Alcoholism (H)  In current remission    Morbid obesity (H)  Previously on Wegovy  Discussed diet and exercise change    Moderate episode of recurrent major depressive disorder (H)  Managed by psychiatrist  Medication list updated  Stable  - DULoxetine (CYMBALTA) 60 MG capsule    Pulmonary emphysema, unspecified emphysema type (H)  Mixed simple and mucopurulent chronic bronchitis (H)  Continue daily controller medication  DuoNeb  Refilling levalbuterol to be used as needed and on the go  -Continue follow-up with pulmonology  - levalbuterol (XOPENEX HFA) 45 MCG/ACT inhaler  Dispense: 15 g; Refill: 5    Prediabetes  Rechecking hemoglobin A1c.  Last Wegovy dose was 2 weeks ago  - Hemoglobin A1c  Discussed diet change  Patient will check with insurance on coverage for medications    Mixed hyperlipidemia  Rechecking previous cholesterol was very high  If above 190 will start on statin or if diabetic status  - Lipid Profile (Chol, Trig, HDL, LDL calc)                  Subjective   Leticia is a 35 year old, presenting for the following health issues:  Medication Follow-up        2/3/2025     5:12 PM   Additional Questions   Roomed by JOAQUIN HARDIN     History of Present Illness       Reason for visit:  Weight loss medication    She eats 0-1 servings of fruits and vegetables daily.She consumes 6 sweetened beverage(s) daily.She exercises with enough effort to increase her heart rate 10 to 19 minutes per day.  She exercises with enough effort to increase her heart rate 3 or less days per week.   She is taking medications regularly.     Insurance dropped wegovy - has been off wegovy for 2 weeks.   GI upset with MFM    Has been putting on  weight.     Weight is 270 at home.     Tachycardia - seen in ED 12/21/24 - echo stress test done 12/30/24    Does have COPD                 Objective           Vitals:  No vitals were obtained today due to virtual visit.    Physical Exam   GENERAL: alert and no distress  EYES: Eyes grossly normal to inspection.  No discharge or erythema, or obvious scleral/conjunctival abnormalities.  RESP: No audible wheeze, cough, or visible cyanosis.    SKIN: Visible skin clear. No significant rash, abnormal pigmentation or lesions.  NEURO: Cranial nerves grossly intact.  Mentation and speech appropriate for age.  PSYCH: Appropriate affect, tone, and pace of words          Video-Visit Details    Joined the call at 2/3/2025, 5:17:25 pm.  Left the call at 2/3/2025, 5:32:36 pm.  You were on the call for 15 minutes 10 seconds .      Type of service:  Video Visit   Originating Location (pt. Location): Home    Distant Location (provider location):  On-site  Platform used for Video Visit: Shane  Signed Electronically by: YEN Capellan CNP

## 2025-04-23 NOTE — H&P (VIEW-ONLY)
Pre-Operative Assessment        4/23/2025   Pre-Op Assessment Procedure Details   Procedure BILATERAL CERVICAL 5-6 AND CERVICAL 6-7 TOTAL DISC ARTHROPLASTY on 5/1 with Cain Dailey at [facility]   Surgeon Cain Dailey   Location Other   Other Location Name [facility]   Procedure Date 5/1/2025     Fax number: [phone]    Mee Hooker is a 35 y.o. old female here for pre-operative evaluation for procedure noted above.     Medication dosing and adherence were reviewed. Of note, uses Symbicort inhaler for emphysema and COPD with good relief of breathing symptoms. No need for rescue inhalers or nebs. No hospitalizations for COPD exacerbation. Previously smoked tobacco but now just using e-cigarettes. Generally using the hydroxyzine just as needed, sometimes none and sometimes as needed. Other mood stabilizing meds are taken daily.    Patient has no personal or family history of concerning reactions to anesthesia, abnormal bleeding, or blood clots with prior surgeries. Patient reports that they snore but their bed partner has not noted any apneic episodes. Last respiratory infection in December 2024 and patient has fully recovered.    Patient has not had any heart racing, heart palpitations, chest discomfort, or shortness of breath in the last 6 months. Past EKGs and heart monitoring for tachycardia, but it has always shown up as sinus tachycardia. Patient was given an option to go on a beta blocker but declined due to having no symptoms.      Patient Active Problem List    Diagnosis Date Noted     Alcohol use disorder, moderate, in early remission ([facility]) 02/12/2025     Nicotine dependence, other tobacco product, uncomplicated ([facility]) 02/12/2025     Akathisia ([facility]) 01/24/2025     Major depressive disorder, recurrent, in partial remission ([facility]) 01/24/2025     Major depressive disorder, recurrent, in full remission ([facility]) 02/07/2023     Generalized anxiety  disorder ([facility]) 02/07/2023     Sleep disturbance 02/07/2023     PTSD (post-traumatic stress disorder) ([facility]) 11/05/2021     Atypical squamous cell changes of undetermined significance (ASCUS) on cervical cytology with negative high risk human papilloma virus (HPV) test result 07/15/2021     Overview Note:     2021 ASCUS HPV negative 31 y.o.  PLAN, per ASCCP Guidelines: cotest 7/2024       PCOS (polycystic ovarian syndrome) ([facility]) 07/07/2021     Hirsutism 03/29/2021        Past Medical History:   Diagnosis Date     Anxiety ([facility])      Chronic obstructive pulmonary disease, unspecified ([facility])      Depression      PCOS (polycystic ovarian syndrome) ([facility])      PONV (postoperative nausea and vomiting) 01/26/2022    PONV after ERCP.  GA with volatile agent.  Intraop ondansetron/dexamethasone.  Additional ondansetron in PACU.        Past Surgical History:   Procedure Laterality Date     UPPER ARM/ELBOW SURGERY  2006        Current Outpatient Medications   Medication Instructions     ARIPiprazole (ABILIFY) 2 mg, Oral, DAILY     budesonide-formoterol (SYMBICORT) 80-4.5 MCG/ACT inhaler 2 Puffs     DULoxetine (CYMBALTA) 120 mg, Oral, DAILY     hydrOXYzine HCl (ATARAX) 30-50 mg, Oral, BID PRN     ipratropium-albuterol (DUONEB) 0.5-2.5 (3) mg/3ml nebulizer solution 3 mL     lamoTRIgine (LAMICTAL) 150 mg, Oral, DAILY     lamoTRIgine (LAMICTAL) 200 mg, Oral, HS       Allergies   Allergen Reactions     Varenicline Other, see comments     Other reaction(s): Nightmares, suicidal ideation and worsening depression, recurrent with rechallenge.    Other Reaction(s): Nightmares     Bupropion Seizures and Other, see comments     History of seizure activity while treated with Wellbutrin    Other Reaction(s): Seizures     Sulfa Antibiotics Rash       Social History     Occupational History     Occupation: [employer]      Comment: lab supervisor   Tobacco Use     Smoking status: Former     Current packs/day: 1.00     Average packs/day:  "1 pack/day for 15.0 years (15.0 ttl pk-yrs)     Types: Cigarettes     Smokeless tobacco: Never   Vaping Use     Vaping status: Every Day     Substances: Nicotine-salt   Substance and Sexual Activity     Alcohol use: Not Currently     Drug use: Never     Sexual activity: Not Currently     Partners: Female         Patient's last menstrual period was 04/01/2025 (approximate).    No family history on file.    Review of Systems:        4/23/2025   ET Amb PreOp Assessment Sx   Have you had a heart attack in the last 30 days? No   Have you experienced chest tightening or chest pressure with activity? No   Do you wake at night with difficulty breathing? No   Do you have swelling in your feet or ankles? No   Do you get short of breath if lying flat at night? No   Do you hear wheezing or whistling when you breathe? No   Have you had a cough, runny nose, or cold symptoms in the last 2 weeks? No   Have you tested positive for Covid in the last 6 months? No   Do you have a long-standing cough? No   Do you snore or are you sleepy during the day? No   Do you have any symptoms due to a recent concussion? No   Do you or close relatives have bleeding or clotting problems? No   Have you taken Aspirin, Ibuprofen (Advil) or Naproxen (Aleve) in the last 7 days? Yes   Do you or close relatives have a history of a severe or life-threatening reaction to anesthesia? No   Explanation of positive responses: IBU yesterday       Estimated Functional Capacity  Can you climb one flight of stairs, or walk up a gradual uphill without stopping? yes, functional capacity is more than or equal to 4 METS      Objective   /88 (BP Location: Left Arm, BP Cuff Size: Large)   Pulse (!) 110   Temp 97.2  F (36.2  C) (Tympanic)   Resp 16   Ht 5' 5.67\" (1.668 m)   Wt 275 lb (124.7 kg)   LMP 04/01/2025 (Approximate)   SpO2 96%   Breastfeeding No   BMI 44.83 kg/m      Physical Exam:  General Appearance: alert, well appearing, and in no apparent " distress  Eyes:  lids normal, sclera clear, and conjunctiva normal  ENT:  oropharynx clear  Neck:  no lymphadenopathy and no thyromegaly or nodules  Heart:  regular rate and rhythm and no murmurs, gallops or rubs  Lungs:  clear to auscultation and no wheezes, rales or rhonchi  Abdomen:  soft, nondistended, nontender, no palpable masses, and no organomegaly  Extremities:  no edema  Skin:  no rashes or worrisome lesions  Neurologic:  normal speech and no facial droop    Data:      Labs: Today: 4/23/2025. CBC, BMP, TSH, Hgb A1c, UA.  ECG: Today: 4/23/2025. Sinus tachycardia.    Assessment/Plan   Patient is medically optimized for planned procedure(s).    1. Preop examination (Primary)  2. Neck pain  3. Left arm numbness  Hopeful that this arthroplasty will allow for relief of neck pain and arm numbness. Updating basic labs per surgeon request.  - Complete Blood Count-No Diff; Future  - Basic Metabolic Panel; Future    4. Tachycardia  Previous workup for tachycardia has been relatively unremarkable, with only sinus tachycardia noted. We will make sure thyroid and EKG are stable today going into anesthesia.   - TSH with Free T4 (if TSH Abnormal); Future  - Ecg 12-Lead Routine - MUSE; Future  - Ecg 12-Lead Routine (Lab perform); Future    5. Prediabetes  Checking prediabetes given that patient has gained 70 lbs since last A1c, as a result of insurance discontinuing coverage of Wegovy. Urinalysis per surgeon request.  - Hgb A1C; Future  - Urinalysis Routine, Micro/Culture if Pos: Clean Catch; Future      Special risks:  At risk for or history of sleep apnea. Recommend RT assessment.  Patient's pulmonary status medically optimized.    Medication recommendations:    Patient Instructions   Follow your individualized medication recommendations as described above.    In addition, please stop all over-the-counter medications including aspirin, ibuprofen (Advil, Motrin), naproxen (Aleve, Naprosyn), herbal remedies and supplements  one week prior to procedure unless directed otherwise by your care team. You may continue to take acetaminophen (Tylenol) up to the day of your procedure.    Continue all other medications as currently taking. Let your care team know if you have questions.    On the day of your procedure, do not wear any hair product including hair sprays and gels and avoid using body sprays and deodorants/antiperspirants.    Bring with you to the site of the procedure:    Any oral appliances or CPAP equipment related to sleep apnea  Any other health-related equipment or devices you use daily      Electronically signed by: Aleksandra Rodriguez MD  4/23/2025, 7:55 AM

## 2025-04-30 ENCOUNTER — ANESTHESIA EVENT (OUTPATIENT)
Dept: SURGERY | Facility: CLINIC | Age: 35
End: 2025-04-30
Payer: COMMERCIAL

## 2025-04-30 ASSESSMENT — COPD QUESTIONNAIRES: COPD: 1

## 2025-04-30 ASSESSMENT — ENCOUNTER SYMPTOMS: DYSRHYTHMIAS: 1

## 2025-04-30 ASSESSMENT — LIFESTYLE VARIABLES: TOBACCO_USE: 1

## 2025-04-30 NOTE — ANESTHESIA PREPROCEDURE EVALUATION
Anesthesia Pre-Procedure Evaluation    Patient: Nhung Concepcion   MRN: [id number] : 1990        Procedure : Procedure(s):  BILATERAL CERVICAL 5-6 AND CERVICAL 6-7 TOTAL DISC ARTHROPLASTY          Past Medical History:   Diagnosis Date     Anxiety      COPD (chronic obstructive pulmonary disease) (H)      Depressive disorder      Gastroesophageal reflux disease      Motion sickness       Past Surgical History:   Procedure Laterality Date     ESOPHAGOSCOPY, GASTROSCOPY, DUODENOSCOPY (EGD), COMBINED  2019    Dr. Krueger [facility]     ESOPHAGOSCOPY, GASTROSCOPY, DUODENOSCOPY (EGD), COMBINED N/A 3/25/2019    Procedure: ESOPHAGOSCOPY, GASTROSCOPY, DUODENOSCOPY;  Surgeon: Sam Krueger MD;  Location:  GI     IR ERCP  2022     ORTHOPEDIC SURGERY      right arm surgery      Allergies   Allergen Reactions     Varenicline      Other reaction(s): Nightmares     Wellbutrin [Bupropion]      History of seizure activity while treated with Wellbutrin     Sulfa Antibiotics Rash      Social History     Tobacco Use     Smoking status: Former     Current packs/day: 0.00     Types: Cigarettes     Quit date: 11/10/2023     Years since quittin.4     Passive exposure: Never     Smokeless tobacco: Never     Tobacco comments:     Vape:nictotine   Substance Use Topics     Alcohol use: Not Currently      Wt Readings from Last 1 Encounters:   24 114.4 kg (252 lb 3.2 oz)        Anesthesia Evaluation   Pt has had prior anesthetic.     History of anesthetic complications  - PONV.      ROS/MED HX  ENT/Pulmonary:     (+)                tobacco use (Vapes), Past use,         COPD,              Neurologic: Comment: Herniated cervical discs      Cardiovascular: Comment: 1/3/25 Stress echo  Interpretation Summary  1. Normal stress echocardiogram without evidence of stress induced ischemia.  2. Normal resting LV systolic performance with an ejection fraction of 60%.  There is normal improvement in left ventricular  systolic performance with a  peak ejection fraction of 75%.  3. No ECG evidence of ischemia.  4. No anginal chest pain reported with exercise.  5. Fair functional capacity for age.    10/25/24 CT Ca screening  Indications  Coronary artery calcification seen on CAT scan   Vaping nicotine dependence, non-tobacco product     Summary  A calcium score of zero places the individual in the lowest quartile when compared to an age and gender matched control group and implies a low risk of cardiac events in the next 10 years. (less than 1% per year).      (+)  - -   -  - -                        dysrhythmias (h/o tachyardia),           (-) murmur   METS/Exercise Tolerance: >4 METS    Hematologic:  - neg hematologic  ROS     Musculoskeletal: Comment: Herniated nucleus pulposus, C5-6       Herniated nucleus pulposus, C6-7     (+) Muscular Dystrophy,              GI/Hepatic:     (+) GERD, Asymptomatic on medication,                  Renal/Genitourinary:  - neg Renal ROS     Endo: Comment: polycystic ovarian syndrome  prediabetes    (+)               Obesity (BMI 42),       Psychiatric/Substance Use: Comment: Alcohol use disorder, moderate, in sustained remission (sober since 8/2022)    (+) psychiatric history (Depression with SI, PTSD) anxiety and depression alcohol abuse      Infectious Disease:       Malignancy:       Other:            Physical Exam    Airway        Mallampati: II   TM distance: > 3 FB   Neck ROM: full   Mouth opening: > 3 cm    Respiratory Devices and Support         Dental     Comment: Good        Cardiovascular   cardiovascular exam normal       Rhythm and rate: regular and normal (-) no murmur    Pulmonary   pulmonary exam normal        breath sounds clear to auscultation       OUTSIDE LABS:  CBC:   Lab Results   Component Value Date    WBC 12.6 (H) 12/21/2024    WBC 9.6 02/28/2024    HGB 13.1 12/21/2024    HGB 12.8 02/28/2024    HCT 38.3 12/21/2024    HCT 38.0 02/28/2024     12/21/2024      "02/28/2024     BMP:   Lab Results   Component Value Date     12/21/2024     10/25/2024    POTASSIUM 4.0 12/21/2024    POTASSIUM 4.0 10/25/2024    CHLORIDE 101 12/21/2024    CHLORIDE 99 10/25/2024    CO2 22 12/21/2024    CO2 24 10/25/2024    BUN 11.9 12/21/2024    BUN 9.8 10/25/2024    CR 0.73 12/21/2024    CR 0.86 10/25/2024     (H) 12/21/2024    GLC 96 10/25/2024     COAGS: No results found for: \"PTT\", \"INR\", \"FIBR\"  POC:   Lab Results   Component Value Date    HCG Negative 07/15/2017    HCGS Negative 12/21/2024     HEPATIC:   Lab Results   Component Value Date    ALBUMIN 4.4 12/21/2024    PROTTOTAL 7.2 12/21/2024    ALT 43 12/21/2024    AST 31 12/21/2024    ALKPHOS 93 12/21/2024    BILITOTAL <0.2 12/21/2024     OTHER:   Lab Results   Component Value Date    A1C 6.0 (H) 10/25/2024    ALVA 9.8 12/21/2024    MAG 2.2 12/21/2024    TSH 1.50 12/21/2024    T4 0.84 10/02/2020    CRP <2.9 01/10/2019    SED 14 01/10/2019       Anesthesia Plan    ASA Status:  3    NPO Status:  NPO Appropriate    Anesthesia Type: General.     - Airway: ETT   Induction: Intravenous, Propofol.   Maintenance: TIVA.   Techniques and Equipment:     - Airway: Video-Laryngoscope     - Lines/Monitors: 2nd IV (prn invasive monitoring lines)     Consents    Anesthesia Plan(s) and associated risks, benefits, and realistic alternatives discussed. Questions answered and patient/representative(s) expressed understanding.     - Discussed: Risks, Benefits and Alternatives for BOTH SEDATION and the PROCEDURE were discussed     - Discussed with:  Patient       - Patient is DNR/DNI Status: No     Use of blood products discussed: Yes.     - Discussed with: Patient.     - Consented: consented to blood products     Postoperative Care    Pain management: Multi-modal analgesia.   PONV prophylaxis: Ondansetron (or other 5HT-3), Dexamethasone or Solumedrol     Comments:    Other Comments: TIVA  Glidescope for neutral " intubation    Precedex  Acetaminophen    PIV x 2               Frederick Tracy MD    Clinically Significant Risk Factors Present on Admission

## 2025-05-01 ENCOUNTER — ANESTHESIA (OUTPATIENT)
Dept: SURGERY | Facility: CLINIC | Age: 35
End: 2025-05-01
Payer: COMMERCIAL

## 2025-05-01 ENCOUNTER — APPOINTMENT (OUTPATIENT)
Dept: RADIOLOGY | Facility: CLINIC | Age: 35
DRG: 518 | End: 2025-05-01
Attending: ORTHOPAEDIC SURGERY
Payer: COMMERCIAL

## 2025-05-01 ENCOUNTER — HOSPITAL ENCOUNTER (INPATIENT)
Facility: CLINIC | Age: 35
DRG: 518 | End: 2025-05-01
Attending: ORTHOPAEDIC SURGERY | Admitting: ORTHOPAEDIC SURGERY
Payer: COMMERCIAL

## 2025-05-01 VITALS
TEMPERATURE: 97.9 F | BODY MASS INDEX: 43.39 KG/M2 | HEIGHT: 66 IN | SYSTOLIC BLOOD PRESSURE: 124 MMHG | OXYGEN SATURATION: 93 % | RESPIRATION RATE: 15 BRPM | WEIGHT: 270 LBS | DIASTOLIC BLOOD PRESSURE: 57 MMHG | HEART RATE: 91 BPM

## 2025-05-01 DIAGNOSIS — M50.20 CERVICAL DISC HERNIATION: Primary | ICD-10-CM

## 2025-05-01 DIAGNOSIS — Z98.890 S/P CERVICAL DISC REPLACEMENT: ICD-10-CM

## 2025-05-01 LAB
GLUCOSE BLDC GLUCOMTR-MCNC: 154 MG/DL (ref 70–99)
GLUCOSE BLDC GLUCOMTR-MCNC: 172 MG/DL (ref 70–99)
GLUCOSE BLDC GLUCOMTR-MCNC: 198 MG/DL (ref 70–99)
GLUCOSE BLDC GLUCOMTR-MCNC: 211 MG/DL (ref 70–99)
GLUCOSE BLDC GLUCOMTR-MCNC: 244 MG/DL (ref 70–99)

## 2025-05-01 PROCEDURE — 370N000017 HC ANESTHESIA TECHNICAL FEE, PER MIN: Performed by: ORTHOPAEDIC SURGERY

## 2025-05-01 PROCEDURE — 272N000001 HC OR GENERAL SUPPLY STERILE: Performed by: ORTHOPAEDIC SURGERY

## 2025-05-01 PROCEDURE — 250N000011 HC RX IP 250 OP 636: Mod: JZ | Performed by: ANESTHESIOLOGY

## 2025-05-01 PROCEDURE — 250N000013 HC RX MED GY IP 250 OP 250 PS 637: Performed by: ORTHOPAEDIC SURGERY

## 2025-05-01 PROCEDURE — 120N000001 HC R&B MED SURG/OB

## 2025-05-01 PROCEDURE — 250N000013 HC RX MED GY IP 250 OP 250 PS 637: Performed by: NURSE ANESTHETIST, CERTIFIED REGISTERED

## 2025-05-01 PROCEDURE — C1889 IMPLANT/INSERT DEVICE, NOC: HCPCS | Performed by: ORTHOPAEDIC SURGERY

## 2025-05-01 PROCEDURE — 250N000011 HC RX IP 250 OP 636: Mod: JW | Performed by: ANESTHESIOLOGY

## 2025-05-01 PROCEDURE — 250N000009 HC RX 250: Performed by: ORTHOPAEDIC SURGERY

## 2025-05-01 PROCEDURE — 999N000141 HC STATISTIC PRE-PROCEDURE NURSING ASSESSMENT: Performed by: ORTHOPAEDIC SURGERY

## 2025-05-01 PROCEDURE — 99222 1ST HOSP IP/OBS MODERATE 55: CPT | Performed by: STUDENT IN AN ORGANIZED HEALTH CARE EDUCATION/TRAINING PROGRAM

## 2025-05-01 PROCEDURE — 250N000013 HC RX MED GY IP 250 OP 250 PS 637: Performed by: PHYSICIAN ASSISTANT

## 2025-05-01 PROCEDURE — 0RR30JZ REPLACEMENT OF CERVICAL VERTEBRAL DISC WITH SYNTHETIC SUBSTITUTE, OPEN APPROACH: ICD-10-PCS | Performed by: ORTHOPAEDIC SURGERY

## 2025-05-01 PROCEDURE — 250N000009 HC RX 250: Performed by: ANESTHESIOLOGY

## 2025-05-01 PROCEDURE — 250N000025 HC SEVOFLURANE, PER MIN: Performed by: ORTHOPAEDIC SURGERY

## 2025-05-01 PROCEDURE — 250N000013 HC RX MED GY IP 250 OP 250 PS 637: Performed by: ANESTHESIOLOGY

## 2025-05-01 PROCEDURE — 258N000003 HC RX IP 258 OP 636: Performed by: ANESTHESIOLOGY

## 2025-05-01 PROCEDURE — 258N000003 HC RX IP 258 OP 636: Performed by: ORTHOPAEDIC SURGERY

## 2025-05-01 PROCEDURE — 258N000003 HC RX IP 258 OP 636: Performed by: NURSE ANESTHETIST, CERTIFIED REGISTERED

## 2025-05-01 PROCEDURE — 250N000005 HC OR RX SURGIFLO HEMOSTATIC MATRIX 10ML 199102S OPNP: Performed by: ORTHOPAEDIC SURGERY

## 2025-05-01 PROCEDURE — 999N000180 XR SURGERY CARM FLUORO LESS THAN 5 MIN

## 2025-05-01 PROCEDURE — 250N000011 HC RX IP 250 OP 636: Performed by: ORTHOPAEDIC SURGERY

## 2025-05-01 PROCEDURE — 250N000012 HC RX MED GY IP 250 OP 636 PS 637: Performed by: STUDENT IN AN ORGANIZED HEALTH CARE EDUCATION/TRAINING PROGRAM

## 2025-05-01 PROCEDURE — 360N000084 HC SURGERY LEVEL 4 W/ FLUORO, PER MIN: Performed by: ORTHOPAEDIC SURGERY

## 2025-05-01 PROCEDURE — 250N000011 HC RX IP 250 OP 636: Performed by: NURSE ANESTHETIST, CERTIFIED REGISTERED

## 2025-05-01 PROCEDURE — 250N000009 HC RX 250: Performed by: NURSE ANESTHETIST, CERTIFIED REGISTERED

## 2025-05-01 PROCEDURE — 01N10ZZ RELEASE CERVICAL NERVE, OPEN APPROACH: ICD-10-PCS | Performed by: ORTHOPAEDIC SURGERY

## 2025-05-01 PROCEDURE — 250N000011 HC RX IP 250 OP 636: Performed by: PHYSICIAN ASSISTANT

## 2025-05-01 PROCEDURE — 710N000010 HC RECOVERY PHASE 1, LEVEL 2, PER MIN: Performed by: ORTHOPAEDIC SURGERY

## 2025-05-01 PROCEDURE — 8E09XBF COMPUTER ASSISTED PROCEDURE OF HEAD AND NECK REGION, WITH FLUOROSCOPY: ICD-10-PCS | Performed by: ORTHOPAEDIC SURGERY

## 2025-05-01 DEVICE — TOTAL CERVICAL DISC REPLACEMENT SYSTEM: MOBI-C CERVICAL DISC PROSTHESIS
Type: IMPLANTABLE DEVICE | Site: SPINE CERVICAL | Status: FUNCTIONAL
Brand: MOBI-C® PLUG & FIT US

## 2025-05-01 RX ORDER — FENTANYL CITRATE 50 UG/ML
50 INJECTION, SOLUTION INTRAMUSCULAR; INTRAVENOUS EVERY 5 MIN PRN
Status: DISCONTINUED | OUTPATIENT
Start: 2025-05-01 | End: 2025-05-01 | Stop reason: HOSPADM

## 2025-05-01 RX ORDER — ALBUTEROL SULFATE 90 UG/1
INHALANT RESPIRATORY (INHALATION) PRN
Status: DISCONTINUED | OUTPATIENT
Start: 2025-05-01 | End: 2025-05-01

## 2025-05-01 RX ORDER — CEFAZOLIN SODIUM/WATER 3 G/30 ML
3 SYRINGE (ML) INTRAVENOUS SEE ADMIN INSTRUCTIONS
Status: DISCONTINUED | OUTPATIENT
Start: 2025-05-01 | End: 2025-05-01 | Stop reason: HOSPADM

## 2025-05-01 RX ORDER — BISACODYL 10 MG
10 SUPPOSITORY, RECTAL RECTAL DAILY PRN
Status: DISCONTINUED | OUTPATIENT
Start: 2025-05-04 | End: 2025-05-02 | Stop reason: HOSPADM

## 2025-05-01 RX ORDER — IPRATROPIUM BROMIDE AND ALBUTEROL SULFATE 2.5; .5 MG/3ML; MG/3ML
1 SOLUTION RESPIRATORY (INHALATION) EVERY 6 HOURS PRN
Status: DISCONTINUED | OUTPATIENT
Start: 2025-05-01 | End: 2025-05-02 | Stop reason: HOSPADM

## 2025-05-01 RX ORDER — NALOXONE HYDROCHLORIDE 0.4 MG/ML
0.4 INJECTION, SOLUTION INTRAMUSCULAR; INTRAVENOUS; SUBCUTANEOUS
Status: DISCONTINUED | OUTPATIENT
Start: 2025-05-01 | End: 2025-05-02 | Stop reason: HOSPADM

## 2025-05-01 RX ORDER — ONDANSETRON 2 MG/ML
INJECTION INTRAMUSCULAR; INTRAVENOUS PRN
Status: DISCONTINUED | OUTPATIENT
Start: 2025-05-01 | End: 2025-05-01

## 2025-05-01 RX ORDER — FLUTICASONE FUROATE AND VILANTEROL 100; 25 UG/1; UG/1
1 POWDER RESPIRATORY (INHALATION) DAILY
Status: DISCONTINUED | OUTPATIENT
Start: 2025-05-02 | End: 2025-05-01

## 2025-05-01 RX ORDER — AMOXICILLIN 250 MG
1 CAPSULE ORAL 2 TIMES DAILY
Status: DISCONTINUED | OUTPATIENT
Start: 2025-05-01 | End: 2025-05-02 | Stop reason: HOSPADM

## 2025-05-01 RX ORDER — DIAZEPAM 5 MG/1
5 TABLET ORAL EVERY 4 HOURS PRN
Status: DISCONTINUED | OUTPATIENT
Start: 2025-05-01 | End: 2025-05-02 | Stop reason: HOSPADM

## 2025-05-01 RX ORDER — METHOCARBAMOL 750 MG/1
750 TABLET, FILM COATED ORAL EVERY 6 HOURS PRN
Status: DISCONTINUED | OUTPATIENT
Start: 2025-05-01 | End: 2025-05-02 | Stop reason: HOSPADM

## 2025-05-01 RX ORDER — DEXAMETHASONE SODIUM PHOSPHATE 10 MG/ML
INJECTION, SOLUTION INTRAMUSCULAR; INTRAVENOUS PRN
Status: DISCONTINUED | OUTPATIENT
Start: 2025-05-01 | End: 2025-05-01

## 2025-05-01 RX ORDER — ONDANSETRON 4 MG/1
4 TABLET, ORALLY DISINTEGRATING ORAL EVERY 30 MIN PRN
Status: DISCONTINUED | OUTPATIENT
Start: 2025-05-01 | End: 2025-05-01 | Stop reason: HOSPADM

## 2025-05-01 RX ORDER — LIDOCAINE 40 MG/G
CREAM TOPICAL
Status: DISCONTINUED | OUTPATIENT
Start: 2025-05-01 | End: 2025-05-01 | Stop reason: HOSPADM

## 2025-05-01 RX ORDER — OXYCODONE HYDROCHLORIDE 5 MG/1
5 TABLET ORAL
Status: DISCONTINUED | OUTPATIENT
Start: 2025-05-01 | End: 2025-05-01 | Stop reason: HOSPADM

## 2025-05-01 RX ORDER — SODIUM CHLORIDE, SODIUM LACTATE, POTASSIUM CHLORIDE, CALCIUM CHLORIDE 600; 310; 30; 20 MG/100ML; MG/100ML; MG/100ML; MG/100ML
INJECTION, SOLUTION INTRAVENOUS CONTINUOUS
Status: DISCONTINUED | OUTPATIENT
Start: 2025-05-01 | End: 2025-05-01 | Stop reason: HOSPADM

## 2025-05-01 RX ORDER — ACETAMINOPHEN 325 MG/1
975 TABLET ORAL ONCE
Status: COMPLETED | OUTPATIENT
Start: 2025-05-01 | End: 2025-05-01

## 2025-05-01 RX ORDER — LORATADINE 10 MG/1
10 TABLET ORAL DAILY
COMMUNITY

## 2025-05-01 RX ORDER — NALOXONE HYDROCHLORIDE 0.4 MG/ML
0.2 INJECTION, SOLUTION INTRAMUSCULAR; INTRAVENOUS; SUBCUTANEOUS
Status: DISCONTINUED | OUTPATIENT
Start: 2025-05-01 | End: 2025-05-02 | Stop reason: HOSPADM

## 2025-05-01 RX ORDER — OXYCODONE HYDROCHLORIDE 5 MG/1
5-10 TABLET ORAL EVERY 4 HOURS PRN
Qty: 25 TABLET | Refills: 0 | Status: SHIPPED | OUTPATIENT
Start: 2025-05-01

## 2025-05-01 RX ORDER — NAPROXEN 500 MG/1
500 TABLET ORAL
Qty: 42 TABLET | Refills: 0 | Status: SHIPPED | OUTPATIENT
Start: 2025-05-05

## 2025-05-01 RX ORDER — HYDROMORPHONE HCL IN WATER/PF 6 MG/30 ML
0.4 PATIENT CONTROLLED ANALGESIA SYRINGE INTRAVENOUS EVERY 5 MIN PRN
Status: DISCONTINUED | OUTPATIENT
Start: 2025-05-01 | End: 2025-05-01 | Stop reason: HOSPADM

## 2025-05-01 RX ORDER — FENTANYL CITRATE 50 UG/ML
INJECTION, SOLUTION INTRAMUSCULAR; INTRAVENOUS PRN
Status: DISCONTINUED | OUTPATIENT
Start: 2025-05-01 | End: 2025-05-01

## 2025-05-01 RX ORDER — NICOTINE POLACRILEX 4 MG
15-30 LOZENGE BUCCAL
Status: DISCONTINUED | OUTPATIENT
Start: 2025-05-01 | End: 2025-05-02 | Stop reason: HOSPADM

## 2025-05-01 RX ORDER — PROCHLORPERAZINE MALEATE 10 MG
10 TABLET ORAL EVERY 6 HOURS PRN
Status: DISCONTINUED | OUTPATIENT
Start: 2025-05-01 | End: 2025-05-02 | Stop reason: HOSPADM

## 2025-05-01 RX ORDER — OXYCODONE HYDROCHLORIDE 5 MG/1
10 TABLET ORAL
Status: DISCONTINUED | OUTPATIENT
Start: 2025-05-01 | End: 2025-05-01 | Stop reason: HOSPADM

## 2025-05-01 RX ORDER — BUDESONIDE AND FORMOTEROL FUMARATE DIHYDRATE 80; 4.5 UG/1; UG/1
2 AEROSOL RESPIRATORY (INHALATION) DAILY
Status: DISCONTINUED | OUTPATIENT
Start: 2025-05-02 | End: 2025-05-02 | Stop reason: HOSPADM

## 2025-05-01 RX ORDER — CEFAZOLIN SODIUM/WATER 3 G/30 ML
3 SYRINGE (ML) INTRAVENOUS
Status: COMPLETED | OUTPATIENT
Start: 2025-05-01 | End: 2025-05-01

## 2025-05-01 RX ORDER — DULOXETIN HYDROCHLORIDE 60 MG/1
120 CAPSULE, DELAYED RELEASE ORAL DAILY
Status: DISCONTINUED | OUTPATIENT
Start: 2025-05-02 | End: 2025-05-02 | Stop reason: HOSPADM

## 2025-05-01 RX ORDER — OXYCODONE HYDROCHLORIDE 5 MG/1
10 TABLET ORAL EVERY 4 HOURS PRN
Status: DISCONTINUED | OUTPATIENT
Start: 2025-05-01 | End: 2025-05-02 | Stop reason: HOSPADM

## 2025-05-01 RX ORDER — LORATADINE 10 MG/1
10 TABLET ORAL DAILY PRN
Status: DISCONTINUED | OUTPATIENT
Start: 2025-05-01 | End: 2025-05-02 | Stop reason: HOSPADM

## 2025-05-01 RX ORDER — ONDANSETRON 2 MG/ML
4 INJECTION INTRAMUSCULAR; INTRAVENOUS EVERY 30 MIN PRN
Status: DISCONTINUED | OUTPATIENT
Start: 2025-05-01 | End: 2025-05-01 | Stop reason: HOSPADM

## 2025-05-01 RX ORDER — LIDOCAINE HYDROCHLORIDE 10 MG/ML
INJECTION, SOLUTION INFILTRATION; PERINEURAL PRN
Status: DISCONTINUED | OUTPATIENT
Start: 2025-05-01 | End: 2025-05-01

## 2025-05-01 RX ORDER — ONDANSETRON 2 MG/ML
4 INJECTION INTRAMUSCULAR; INTRAVENOUS EVERY 6 HOURS PRN
Status: DISCONTINUED | OUTPATIENT
Start: 2025-05-01 | End: 2025-05-02 | Stop reason: HOSPADM

## 2025-05-01 RX ORDER — OXYCODONE HYDROCHLORIDE 5 MG/1
5 TABLET ORAL EVERY 4 HOURS PRN
Status: DISCONTINUED | OUTPATIENT
Start: 2025-05-01 | End: 2025-05-02 | Stop reason: HOSPADM

## 2025-05-01 RX ORDER — DEXAMETHASONE SODIUM PHOSPHATE 4 MG/ML
4 INJECTION, SOLUTION INTRA-ARTICULAR; INTRALESIONAL; INTRAMUSCULAR; INTRAVENOUS; SOFT TISSUE
Status: DISCONTINUED | OUTPATIENT
Start: 2025-05-01 | End: 2025-05-01 | Stop reason: HOSPADM

## 2025-05-01 RX ORDER — THERA TABS 400 MCG
1 TAB ORAL DAILY
Status: DISCONTINUED | OUTPATIENT
Start: 2025-05-02 | End: 2025-05-02 | Stop reason: HOSPADM

## 2025-05-01 RX ORDER — NALOXONE HYDROCHLORIDE 0.4 MG/ML
0.1 INJECTION, SOLUTION INTRAMUSCULAR; INTRAVENOUS; SUBCUTANEOUS
Status: DISCONTINUED | OUTPATIENT
Start: 2025-05-01 | End: 2025-05-01 | Stop reason: HOSPADM

## 2025-05-01 RX ORDER — ONDANSETRON 4 MG/1
4 TABLET, ORALLY DISINTEGRATING ORAL EVERY 6 HOURS PRN
Qty: 10 TABLET | Refills: 0 | Status: SHIPPED | OUTPATIENT
Start: 2025-05-01

## 2025-05-01 RX ORDER — ACETAMINOPHEN 325 MG/1
975 TABLET ORAL EVERY 8 HOURS
Status: DISCONTINUED | OUTPATIENT
Start: 2025-05-01 | End: 2025-05-02 | Stop reason: HOSPADM

## 2025-05-01 RX ORDER — ONDANSETRON 4 MG/1
4 TABLET, ORALLY DISINTEGRATING ORAL EVERY 6 HOURS PRN
Status: DISCONTINUED | OUTPATIENT
Start: 2025-05-01 | End: 2025-05-02 | Stop reason: HOSPADM

## 2025-05-01 RX ORDER — HYDROMORPHONE HCL IN WATER/PF 6 MG/30 ML
0.2 PATIENT CONTROLLED ANALGESIA SYRINGE INTRAVENOUS EVERY 5 MIN PRN
Status: DISCONTINUED | OUTPATIENT
Start: 2025-05-01 | End: 2025-05-01 | Stop reason: HOSPADM

## 2025-05-01 RX ORDER — POLYETHYLENE GLYCOL 3350 17 G/17G
17 POWDER, FOR SOLUTION ORAL DAILY
Status: DISCONTINUED | OUTPATIENT
Start: 2025-05-02 | End: 2025-05-02 | Stop reason: HOSPADM

## 2025-05-01 RX ORDER — HYDROMORPHONE HCL IN WATER/PF 6 MG/30 ML
0.4 PATIENT CONTROLLED ANALGESIA SYRINGE INTRAVENOUS
Status: DISCONTINUED | OUTPATIENT
Start: 2025-05-01 | End: 2025-05-02 | Stop reason: HOSPADM

## 2025-05-01 RX ORDER — SODIUM CHLORIDE 9 MG/ML
INJECTION, SOLUTION INTRAVENOUS CONTINUOUS
Status: DISCONTINUED | OUTPATIENT
Start: 2025-05-01 | End: 2025-05-02 | Stop reason: HOSPADM

## 2025-05-01 RX ORDER — FENTANYL CITRATE 50 UG/ML
25 INJECTION, SOLUTION INTRAMUSCULAR; INTRAVENOUS EVERY 5 MIN PRN
Status: DISCONTINUED | OUTPATIENT
Start: 2025-05-01 | End: 2025-05-01 | Stop reason: HOSPADM

## 2025-05-01 RX ORDER — HYDROXYZINE HYDROCHLORIDE 25 MG/1
25 TABLET, FILM COATED ORAL EVERY 6 HOURS PRN
Status: DISCONTINUED | OUTPATIENT
Start: 2025-05-01 | End: 2025-05-02 | Stop reason: HOSPADM

## 2025-05-01 RX ORDER — LAMOTRIGINE 150 MG/1
150 TABLET ORAL EVERY MORNING
Status: DISCONTINUED | OUTPATIENT
Start: 2025-05-02 | End: 2025-05-02 | Stop reason: HOSPADM

## 2025-05-01 RX ORDER — LAMOTRIGINE 100 MG/1
200 TABLET ORAL AT BEDTIME
Status: DISCONTINUED | OUTPATIENT
Start: 2025-05-01 | End: 2025-05-02 | Stop reason: HOSPADM

## 2025-05-01 RX ORDER — METOPROLOL TARTRATE 1 MG/ML
5 INJECTION, SOLUTION INTRAVENOUS
Status: COMPLETED | OUTPATIENT
Start: 2025-05-01 | End: 2025-05-01

## 2025-05-01 RX ORDER — METHOCARBAMOL 750 MG/1
750 TABLET, FILM COATED ORAL EVERY 6 HOURS PRN
Qty: 30 TABLET | Refills: 0 | Status: SHIPPED | OUTPATIENT
Start: 2025-05-01

## 2025-05-01 RX ORDER — IPRATROPIUM BROMIDE AND ALBUTEROL SULFATE 2.5; .5 MG/3ML; MG/3ML
3 SOLUTION RESPIRATORY (INHALATION) ONCE
Status: COMPLETED | OUTPATIENT
Start: 2025-05-01 | End: 2025-05-01

## 2025-05-01 RX ORDER — DIAZEPAM 10 MG/2ML
2.5 INJECTION, SOLUTION INTRAMUSCULAR; INTRAVENOUS ONCE
Status: COMPLETED | OUTPATIENT
Start: 2025-05-01 | End: 2025-05-01

## 2025-05-01 RX ORDER — LEVALBUTEROL TARTRATE 45 UG/1
2 AEROSOL, METERED ORAL EVERY 4 HOURS PRN
Status: DISCONTINUED | OUTPATIENT
Start: 2025-05-01 | End: 2025-05-02 | Stop reason: HOSPADM

## 2025-05-01 RX ORDER — PROPOFOL 10 MG/ML
INJECTION, EMULSION INTRAVENOUS PRN
Status: DISCONTINUED | OUTPATIENT
Start: 2025-05-01 | End: 2025-05-01

## 2025-05-01 RX ORDER — PROPOFOL 10 MG/ML
INJECTION, EMULSION INTRAVENOUS CONTINUOUS PRN
Status: DISCONTINUED | OUTPATIENT
Start: 2025-05-01 | End: 2025-05-01

## 2025-05-01 RX ORDER — HYDROXYZINE HYDROCHLORIDE 25 MG/1
25 TABLET, FILM COATED ORAL EVERY 4 HOURS PRN
Status: DISCONTINUED | OUTPATIENT
Start: 2025-05-01 | End: 2025-05-02 | Stop reason: HOSPADM

## 2025-05-01 RX ORDER — HYDROXYZINE HYDROCHLORIDE 50 MG/ML
25 INJECTION, SOLUTION INTRAMUSCULAR EVERY 4 HOURS PRN
Status: DISCONTINUED | OUTPATIENT
Start: 2025-05-01 | End: 2025-05-02 | Stop reason: HOSPADM

## 2025-05-01 RX ORDER — ACETAMINOPHEN 325 MG/1
975 TABLET ORAL EVERY 8 HOURS
COMMUNITY
Start: 2025-05-01

## 2025-05-01 RX ORDER — CEFAZOLIN SODIUM 2 G/50ML
2 SOLUTION INTRAVENOUS EVERY 8 HOURS
Status: COMPLETED | OUTPATIENT
Start: 2025-05-01 | End: 2025-05-02

## 2025-05-01 RX ORDER — ARIPIPRAZOLE 2 MG/1
2 TABLET ORAL DAILY
Status: DISCONTINUED | OUTPATIENT
Start: 2025-05-02 | End: 2025-05-02 | Stop reason: HOSPADM

## 2025-05-01 RX ORDER — DEXTROSE MONOHYDRATE 25 G/50ML
25-50 INJECTION, SOLUTION INTRAVENOUS
Status: DISCONTINUED | OUTPATIENT
Start: 2025-05-01 | End: 2025-05-02 | Stop reason: HOSPADM

## 2025-05-01 RX ORDER — DIPHENHYDRAMINE HCL 25 MG
25 CAPSULE ORAL EVERY 6 HOURS PRN
Status: DISCONTINUED | OUTPATIENT
Start: 2025-05-01 | End: 2025-05-02 | Stop reason: HOSPADM

## 2025-05-01 RX ORDER — HYDROMORPHONE HCL IN WATER/PF 6 MG/30 ML
0.2 PATIENT CONTROLLED ANALGESIA SYRINGE INTRAVENOUS
Status: DISCONTINUED | OUTPATIENT
Start: 2025-05-01 | End: 2025-05-02 | Stop reason: HOSPADM

## 2025-05-01 RX ORDER — GABAPENTIN 300 MG/1
300 CAPSULE ORAL
Status: COMPLETED | OUTPATIENT
Start: 2025-05-01 | End: 2025-05-01

## 2025-05-01 RX ADMIN — PROPOFOL 200 MG: 10 INJECTION, EMULSION INTRAVENOUS at 07:32

## 2025-05-01 RX ADMIN — GABAPENTIN 300 MG: 300 CAPSULE ORAL at 06:10

## 2025-05-01 RX ADMIN — FENTANYL CITRATE 25 MCG: 50 INJECTION INTRAMUSCULAR; INTRAVENOUS at 10:50

## 2025-05-01 RX ADMIN — METOPROLOL TARTRATE 5 MG: 5 INJECTION INTRAVENOUS at 10:55

## 2025-05-01 RX ADMIN — FENTANYL CITRATE 100 MCG: 50 INJECTION INTRAMUSCULAR; INTRAVENOUS at 07:32

## 2025-05-01 RX ADMIN — ROCURONIUM BROMIDE 40 MG: 10 INJECTION, SOLUTION INTRAVENOUS at 09:15

## 2025-05-01 RX ADMIN — HYDROMORPHONE HYDROCHLORIDE 0.5 MG: 1 INJECTION, SOLUTION INTRAMUSCULAR; INTRAVENOUS; SUBCUTANEOUS at 08:13

## 2025-05-01 RX ADMIN — FENTANYL CITRATE 25 MCG: 50 INJECTION INTRAMUSCULAR; INTRAVENOUS at 10:57

## 2025-05-01 RX ADMIN — ROCURONIUM BROMIDE 50 MG: 10 INJECTION, SOLUTION INTRAVENOUS at 07:32

## 2025-05-01 RX ADMIN — LIDOCAINE HYDROCHLORIDE 5 ML: 10 INJECTION, SOLUTION INFILTRATION; PERINEURAL at 07:32

## 2025-05-01 RX ADMIN — DIAZEPAM 2.5 MG: 5 INJECTION, SOLUTION INTRAMUSCULAR; INTRAVENOUS at 11:11

## 2025-05-01 RX ADMIN — SENNOSIDES AND DOCUSATE SODIUM 1 TABLET: 50; 8.6 TABLET ORAL at 21:28

## 2025-05-01 RX ADMIN — ONDANSETRON 4 MG: 2 INJECTION INTRAMUSCULAR; INTRAVENOUS at 10:00

## 2025-05-01 RX ADMIN — DEXMEDETOMIDINE HYDROCHLORIDE 8 MCG: 100 INJECTION, SOLUTION INTRAVENOUS at 10:01

## 2025-05-01 RX ADMIN — FENTANYL CITRATE 100 MCG: 50 INJECTION INTRAMUSCULAR; INTRAVENOUS at 08:25

## 2025-05-01 RX ADMIN — CEFAZOLIN SODIUM 2 G: 2 SOLUTION INTRAVENOUS at 15:50

## 2025-05-01 RX ADMIN — ACETAMINOPHEN 975 MG: 325 TABLET, FILM COATED ORAL at 13:53

## 2025-05-01 RX ADMIN — HYDROMORPHONE HYDROCHLORIDE 0.2 MG: 0.2 INJECTION, SOLUTION INTRAMUSCULAR; INTRAVENOUS; SUBCUTANEOUS at 11:30

## 2025-05-01 RX ADMIN — MIDAZOLAM 2 MG: 1 INJECTION INTRAMUSCULAR; INTRAVENOUS at 07:23

## 2025-05-01 RX ADMIN — OXYCODONE 10 MG: 5 TABLET ORAL at 16:46

## 2025-05-01 RX ADMIN — METHOCARBAMOL 750 MG: 750 TABLET ORAL at 23:53

## 2025-05-01 RX ADMIN — SODIUM CHLORIDE, SODIUM LACTATE, POTASSIUM CHLORIDE, AND CALCIUM CHLORIDE: .6; .31; .03; .02 INJECTION, SOLUTION INTRAVENOUS at 09:36

## 2025-05-01 RX ADMIN — PROPOFOL 150 MCG/KG/MIN: 10 INJECTION, EMULSION INTRAVENOUS at 07:35

## 2025-05-01 RX ADMIN — ACETAMINOPHEN 975 MG: 325 TABLET, FILM COATED ORAL at 06:10

## 2025-05-01 RX ADMIN — HYDROMORPHONE HYDROCHLORIDE 0.4 MG: 0.2 INJECTION, SOLUTION INTRAMUSCULAR; INTRAVENOUS; SUBCUTANEOUS at 20:16

## 2025-05-01 RX ADMIN — ROCURONIUM BROMIDE 50 MG: 10 INJECTION, SOLUTION INTRAVENOUS at 07:55

## 2025-05-01 RX ADMIN — DEXAMETHASONE SODIUM PHOSPHATE 10 MG: 10 INJECTION, SOLUTION INTRAMUSCULAR; INTRAVENOUS at 07:32

## 2025-05-01 RX ADMIN — ALBUTEROL SULFATE 10 PUFF: 90 AEROSOL, METERED RESPIRATORY (INHALATION) at 09:11

## 2025-05-01 RX ADMIN — DEXMEDETOMIDINE HYDROCHLORIDE 12 MCG: 100 INJECTION, SOLUTION INTRAVENOUS at 08:13

## 2025-05-01 RX ADMIN — SODIUM CHLORIDE, SODIUM LACTATE, POTASSIUM CHLORIDE, AND CALCIUM CHLORIDE: .6; .31; .03; .02 INJECTION, SOLUTION INTRAVENOUS at 07:23

## 2025-05-01 RX ADMIN — ROCURONIUM BROMIDE 30 MG: 10 INJECTION, SOLUTION INTRAVENOUS at 08:31

## 2025-05-01 RX ADMIN — LAMOTRIGINE 200 MG: 100 TABLET ORAL at 21:29

## 2025-05-01 RX ADMIN — MIDAZOLAM 2 MG: 1 INJECTION INTRAMUSCULAR; INTRAVENOUS at 07:26

## 2025-05-01 RX ADMIN — Medication 250 MG: at 10:01

## 2025-05-01 RX ADMIN — OXYCODONE 10 MG: 5 TABLET ORAL at 12:41

## 2025-05-01 RX ADMIN — SENNOSIDES AND DOCUSATE SODIUM 1 TABLET: 50; 8.6 TABLET ORAL at 12:41

## 2025-05-01 RX ADMIN — ALBUTEROL SULFATE 10 PUFF: 90 AEROSOL, METERED RESPIRATORY (INHALATION) at 08:30

## 2025-05-01 RX ADMIN — DEXMEDETOMIDINE HYDROCHLORIDE 12 MCG: 100 INJECTION, SOLUTION INTRAVENOUS at 07:35

## 2025-05-01 RX ADMIN — FENTANYL CITRATE 25 MCG: 50 INJECTION INTRAMUSCULAR; INTRAVENOUS at 10:39

## 2025-05-01 RX ADMIN — INSULIN ASPART 2 UNITS: 100 INJECTION, SOLUTION INTRAVENOUS; SUBCUTANEOUS at 16:48

## 2025-05-01 RX ADMIN — ACETAMINOPHEN 975 MG: 325 TABLET, FILM COATED ORAL at 21:28

## 2025-05-01 RX ADMIN — HYDROMORPHONE HYDROCHLORIDE 0.2 MG: 0.2 INJECTION, SOLUTION INTRAMUSCULAR; INTRAVENOUS; SUBCUTANEOUS at 17:36

## 2025-05-01 RX ADMIN — ALBUTEROL SULFATE 10 PUFF: 90 AEROSOL, METERED RESPIRATORY (INHALATION) at 09:21

## 2025-05-01 RX ADMIN — CEFAZOLIN SODIUM 2 G: 2 SOLUTION INTRAVENOUS at 23:53

## 2025-05-01 RX ADMIN — HYDROMORPHONE HYDROCHLORIDE 0.5 MG: 1 INJECTION, SOLUTION INTRAMUSCULAR; INTRAVENOUS; SUBCUTANEOUS at 08:07

## 2025-05-01 RX ADMIN — SODIUM CHLORIDE: 0.9 INJECTION, SOLUTION INTRAVENOUS at 12:55

## 2025-05-01 RX ADMIN — PHENYLEPHRINE HYDROCHLORIDE 100 MCG: 10 INJECTION INTRAVENOUS at 09:15

## 2025-05-01 RX ADMIN — FENTANYL CITRATE 25 MCG: 50 INJECTION INTRAMUSCULAR; INTRAVENOUS at 10:45

## 2025-05-01 RX ADMIN — OXYCODONE 10 MG: 5 TABLET ORAL at 21:28

## 2025-05-01 RX ADMIN — IPRATROPIUM BROMIDE AND ALBUTEROL SULFATE 3 ML: .5; 3 SOLUTION RESPIRATORY (INHALATION) at 11:19

## 2025-05-01 RX ADMIN — SODIUM CHLORIDE: 0.9 INJECTION, SOLUTION INTRAVENOUS at 21:31

## 2025-05-01 RX ADMIN — BENZOCAINE AND MENTHOL 1 LOZENGE: 15; 3.6 LOZENGE ORAL at 20:18

## 2025-05-01 RX ADMIN — Medication 3 G: at 07:22

## 2025-05-01 RX ADMIN — HYDROMORPHONE HYDROCHLORIDE 0.2 MG: 0.2 INJECTION, SOLUTION INTRAMUSCULAR; INTRAVENOUS; SUBCUTANEOUS at 13:55

## 2025-05-01 RX ADMIN — HYDROMORPHONE HYDROCHLORIDE 0.2 MG: 0.2 INJECTION, SOLUTION INTRAMUSCULAR; INTRAVENOUS; SUBCUTANEOUS at 11:37

## 2025-05-01 ASSESSMENT — ACTIVITIES OF DAILY LIVING (ADL)
ADLS_ACUITY_SCORE: 15
ADLS_ACUITY_SCORE: 27
ADLS_ACUITY_SCORE: 15
ADLS_ACUITY_SCORE: 15
ADLS_ACUITY_SCORE: 27
ADLS_ACUITY_SCORE: 28
ADLS_ACUITY_SCORE: 28
ADLS_ACUITY_SCORE: 15
ADLS_ACUITY_SCORE: 28
ADLS_ACUITY_SCORE: 27
ADLS_ACUITY_SCORE: 15
ADLS_ACUITY_SCORE: 28
ADLS_ACUITY_SCORE: 15
ADLS_ACUITY_SCORE: 28
ADLS_ACUITY_SCORE: 28
ADLS_ACUITY_SCORE: 15
ADLS_ACUITY_SCORE: 28
ADLS_ACUITY_SCORE: 28

## 2025-05-01 NOTE — BRIEF OP NOTE
[facility]    Brief Operative Note    Pre-operative diagnosis: Neck pain [M54.2]  Herniated nucleus pulposus, C5-6 [M50.222]  Herniated nucleus pulposus, C6-7 [M50.223]  Post-operative diagnosis Same as pre-operative diagnosis    Procedure: BILATERAL CERVICAL 5-6 AND CERVICAL 6-7 TOTAL DISC ARTHROPLASTY, Bilateral - Spine    Surgeon: Surgeons and Role:     * Cain Jim MD - Primary     * Patience Gerard PA-C - Assisting  Anesthesia: General   Estimated Blood Loss: 20mL    Drains: None  Specimens: * No specimens in log *  Findings:   None.  Complications: None.  Implants: * No implants in log *

## 2025-05-01 NOTE — PLAN OF CARE
Problem: Adult Inpatient Plan of Care  Goal: Optimal Comfort and Wellbeing  Intervention: Monitor Pain and Promote Comfort  Recent Flowsheet Documentation  Taken 5/1/2025 1403 by Devon Bonilla, RN  Pain Management Interventions: medication (see MAR)  Taken 5/1/2025 1325 by Devon Bonilla, RN  Pain Management Interventions: medication (see MAR)     Goal Outcome Evaluation:  Pt  is AOX4, able to make needs known. Pt is on RA at baseline, requires O2 while sleeping d/t possible HAYLEY. Pt has some pain that is being managed with PRN and scheduled meds per MAR. Pt is a SBAX1 with GB. Needs second person to manage IV fluid line. Pt is on liquid diet until tolerating more solid foods, RN to advance. Passed PVR. Pt is AC & HS for prediabetes and increased sugar levels. Has 2 peripheral IV's one in the right hand, one in the left hand, both flush well.    Care hours: [id number]

## 2025-05-01 NOTE — INTERVAL H&P NOTE
"I have reviewed the virtual H&P that is linked to this encounter. The physical exam performed by anesthesia during this surgical encounter serves as the physical portion of that the virtual H&P. There are no significant changes from that history and physical as noted.    Clinical Conditions Present on Arrival:  Clinically Significant Risk Factors Present on Admission                       # Morbid Obesity: Estimated body mass index is 43.58 kg/m  as calculated from the following:    Height as of this encounter: 1.676 m (5' 6\").    Weight as of this encounter: 122.5 kg (270 lb).       "

## 2025-05-01 NOTE — ANESTHESIA PROCEDURE NOTES
Airway       Patient location during procedure: OR       Procedure Start/Stop Times: 5/1/2025 7:37 AM  Staff -        Anesthesiologist:  Frederick Tracy MD       CRNA: Lina Page APRN CRNA       Other Anesthesia Staff: Ashia Dick       Performed By: SRNA  Consent for Airway        Urgency: elective  Indications and Patient Condition       Indications for airway management: arcelia-procedural       Induction type:intravenous       Mask difficulty assessment: 2 - vent by mask + OA or adjuvant +/- NMBA    Final Airway Details       Final airway type: endotracheal airway       Successful airway: ETT - single  Endotracheal Airway Details        ETT size (mm): 7.0       Cuffed: yes       Successful intubation technique: video laryngoscopy       VL Blade Size: Glidescope 3       Grade View of Cords: 1       Adjucts: stylet       Position: Right       Measured from: lips       Secured at (cm): 20       Bite block used: None    Post intubation assessment        Placement verified by: capnometry, equal breath sounds and chest rise        Number of attempts at approach: 1       Number of other approaches attempted: 0       Secured with: tape and commercial tube hyatt       Ease of procedure: easy       Dentition: Intact and Unchanged    Medication(s) Administered   Medication Administration Time: 5/1/2025 7:37 AM

## 2025-05-01 NOTE — PROVIDER NOTIFICATION
MDA notified of patient blood glucose of 198. No new interventions at this time.    Patient stating she is anxious, heart rate in the 120s. 5mg metoprolol given per MDA as well as 2.5mg valium. Duoneb also ordered and will be administered.     Ashutosh Pitt RN

## 2025-05-01 NOTE — PHARMACY-ADMISSION MEDICATION HISTORY
Pharmacist Admission Medication History    Admission medication history is complete. The information provided in this note is only as accurate as the sources available at the time of the update.    Information Source(s): Patient via in-person    Allergies reviewed with patient and updates made in EHR: yes    Medication History Completed By: Agueda Law [facility] 5/1/2025 6:35 AM    PTA Med List   Medication Sig Last Dose/Taking    ARIPiprazole (ABILIFY) 2 MG tablet Take 2 mg by mouth daily. 5/1/2025    budesonide-formoterol (SYMBICORT) 80-4.5 MCG/ACT Inhaler Inhale 2 puffs into the lungs 2 times daily. (Patient taking differently: Inhale 2 puffs into the lungs daily.) 5/1/2025    DULoxetine (CYMBALTA) 60 MG capsule Take 2 capsules (120 mg) by mouth daily. 5/1/2025 Morning    hydrOXYzine HCl (ATARAX) 10 MG tablet Take 3-5 tablets by mouth 2 times daily as needed for anxiety. Past Week    ipratropium - albuterol 0.5 mg/2.5 mg/3 mL (DUONEB) 0.5-2.5 (3) MG/3ML neb solution Take 1 vial (3 mLs) by nebulization every 6 hours as needed for shortness of breath, wheezing or cough. More than a month    lamoTRIgine (LAMICTAL) 150 MG tablet Take 150 mg by mouth every morning. 5/1/2025    lamoTRIgine (LAMICTAL) 200 MG tablet Take 200 mg by mouth at bedtime. 4/30/2025 Bedtime    levalbuterol (XOPENEX HFA) 45 MCG/ACT inhaler Inhale 2 puffs into the lungs every 4 hours as needed for shortness of breath or wheezing. More than a month    loratadine (CLARITIN) 10 MG tablet Take 10 mg by mouth daily. 5/1/2025 Morning

## 2025-05-01 NOTE — ANESTHESIA CARE TRANSFER NOTE
Patient: Nhung Concepcion    Procedure: Procedure(s):  BILATERAL CERVICAL 5-6 AND CERVICAL 6-7 TOTAL DISC ARTHROPLASTY       Diagnosis: Neck pain [M54.2]  Herniated nucleus pulposus, C5-6 [M50.222]  Herniated nucleus pulposus, C6-7 [M50.223]  Diagnosis Additional Information: No value filed.    Anesthesia Type:   General     Note:    Oropharynx: oropharynx clear of all foreign objects  Level of Consciousness: drowsy  Oxygen Supplementation: face mask  Level of Supplemental Oxygen (L/min / FiO2): 8  Independent Airway: airway patency satisfactory and stable  Dentition: dentition unchanged  Vital Signs Stable: post-procedure vital signs reviewed and stable  Report to RN Given: handoff report given  Patient transferred to: PACU    Handoff Report: Identifed the Patient, Identified the Reponsible Provider, Reviewed the pertinent medical history, Discussed the surgical course, Reviewed Intra-OP anesthesia mangement and issues during anesthesia, Set expectations for post-procedure period and Allowed opportunity for questions and acknowledgement of understanding      Vitals:  Vitals Value Taken Time   /80 05/01/25 1015   Temp 36.4  C (97.52  F) 05/01/25 1017   Pulse 91 05/01/25 1017   Resp 10 05/01/25 1017   SpO2 95 % 05/01/25 1017   Vitals shown include unfiled device data.    Electronically Signed By: YEN Hicks CRNA  May 1, 2025  10:18 AM   No

## 2025-05-01 NOTE — OP NOTE
Orthopedic  Operative Note    Pre-operative diagnosis: 1.  Left C5-6 chronic disc herniation   2.  Left C6-7 posterior lateral disc protrusion    Post-operative diagnosis: Same    Procedure: 1.  C5-6 total disc arthroplasty (LDR Mobi-C ,5mm x 15mm x 15mm)   2.  C6-7 total disc arthroplasty (LDR Mobi-C, 6mm x 15mm x 15mm)   3.  C5-6 anterior cervical discectomy and decompression with    bilateral foraminotomies  4.  C6-7 anterior cervical discectomy and decompression with bilateral foraminotomies  5.  Use of operative microscopy  6.  Postoperative interpretation of radiographs      Surgeon: Cain Jim MD    Assistant(s): Patience Gerard PA-C.  Please note this procedure technically required an assistant for the safety of the patient, MsUrsula Gerard was in experience being spinal surgery her assistance was imperative and necessary safety protect surrounding soft tissue and neural structures as I performed the procedure.    Anesthesia: General endotracheal anesthesia    Estimated blood loss: 20mL     Drains: None    Specimens: None      Findings: C5-6, C6-7 herniated nucleus pulposus    Complications: None      Condition: Stable     Weight bearing status: Weight bearing as tolerated          Procedure Detail: Nhung is a 35-year-old female who presented to my office with significant left sided upper extremity radiculopathy matching both the C6 and C7 dermatomes with associated weakness.  An MRI demonstrated evidence of a left C5-6 chronic disc herniation and a posterolateral disc protrusion at C6-7.  After an exhaustive course of conservative care, she elected to proceed with surgical intervention.  I detailed description of the risk, benefits, and treatment alternatives inherent to the operation was explained in advance to the patient which include but are not limited to: Paralysis, failure to improve, hemorrhage, infection, need for future surgery, adjacent segment disease, permanent or transient nerve root  injury, cerebrospinal fluid leakage, dysphagia, or dysphonia.  The risk of potential conversion to a fusion was also discussed prior to the operation, and she understood this as a possibility.  Spite these risks, she elected to proceed.  She was then seen by her primary care physician and scheduled for surgery.    The patient was met in the preoperative holding area by myself at which time the consent was reviewed and signed and the site was marked on the patient's left anterior neck.  She was then seen by the anesthesia service and escorted back to the operating room.  Upon arrival in the operating room, the patient was intubated by the anesthesia service without complication and a Pelayo catheter was inserted.  She was then placed in the supine position on the operating table and all bony prominences of the superficial nerves were well-padded.  A bump was placed between her scapula and her neck was placed in slight amount of extension.  Her anterior neck was then prepped and draped in the normal sterile fashion and a timeout was called to ensure the proper procedure to be performed as well as the administration of prophylactic antibiotics.  Once this was completed, the procedure began.    C-arm fluoroscopic imaging was brought into the lateral plane, and under lateral fluoroscopic imaging we identified the proximal location of the C6 vertebral body.  This was marked on the left anterior neck.  A standard left-sided Smith-Woo approach to the anterior cervical spine was then utilized.  An incision was made from the midline to the medial border of the sternocleidomastoid, and carried down through subcutaneous tissue and underlying fat.  The skin was undermined with a Metzenbaum scissors and pickups.  We then identified the platysma musculature and divided this longitudinally in the direction of its fibers.  We dissected along the medial border of the sternocleidomastoid muscle retracting the trachea and esophagus  across the midline and the sternocleidomastoid muscle and underlying neurovascular bundle laterally.  The pretracheal and the prevertebral fascia were identified and divided longitudinally.  We dissected down to the C5-6 and C6-7 disc spaces once we identified the disc bases bent spinal needles were placed into each disc space and a lateral fluoroscopic image confirmed the correct operative level with the needles in place.  The disc bases were marked, and the needles were removed.  We elevated the longus coli musculature on the left and right sides and self-retaining retractors were placed.    [place] distraction pins were then first placed parallel to the endplates at C6 and 7 under live lateral fluoroscopic imaging.  Distraction was applied across the disc space, and the operating microscope was draped and brought in.    Under operative microscopy, we started at C6-7 disc space.  A 15 blade was used to incise the outer annulus and we performed a complete discectomy back to the posterior longitudinal ligament with a series of straight and angled curettes Kerrison and pituitary rongeurs.  The disc base was sequentially elevated to normal anatomic height.  High-speed bur was used to take down the uncovertebral joints as necessary to decompress the foramen.  Straight and angled micro curettes were then used to perform foraminotomies with combination of a 1 mm Kerrison.  We identified the midline raphae and elevated this with a 45 degree Rhoton nerve hook and then transected the PLL from midline laterally on both left and right sides.  This allowed us to gain normal posterior disc height.  When the decompression was complete, trial implants were placed under live lateral fluoroscopic imaging.  An LDR Mobi-C trial measuring 6mm x 15mm x 15mm was impacted into place between the C6 and 7 vertebral bodies under live lateral fluoroscopic imaging and found to have a good secure fit.  The implant itself was opened.  It was  then attached to the insertion jig and impacted into place between the C6 and 7 vertebral bodies under live lateral fluoroscopic imaging until was recessed the appropriate depth.  Compression was then applied across the implant.    Harriman pin was then removed from C6 and bone wax was placed in the pinhole.  The Harriman pin was then placed into the C5 vertebral body parallel to the endplates under live lateral fluoroscopic imaging, and distraction was applied across the C5-6 disc base.  In a similar fashion, a 15 blade was used to again incise the outer annulus at C5-6 and we performed a complete discectomy back to the posterior longitudinal ligament with a series of straight and angled curettes Kerrison pituitary rongeurs.  The disc space was sequentially elevated to normal anatomic height.  We used a high-speed bur to take down the uncovertebral joints bilaterally and then decompressed the foramen with straight and angled micro curettes and 1 mm Kerrison.  When the foramen were decompressed, we then found the midline raphae.  A 45 degree Rhotard nerve hook was then used to elevate the PLL and we transected this from midline laterally on both left and right sides.  There was a significant amount of disc material cephalad to the disc space on the left side of the canal, which was resected.  We then further decompressed the central neural elements in the foramen with straight and angled micro curettes.  Trial implants were again placed and a LDR Mobi-C trial measuring 5mm x 15m x 15mm was found to have a good secure fit.  The midline was again noted.  The implant itself was opened and attached to the insertion jig and then impacted into place between the C6 and 7 vertebral bodies under live lateral fluoroscopic imaging.  It was recessed the appropriate depth and found to have a good secure fit.  Compression was applied across the disc space.  Harriman pins were then removed at C6 and 7 and bone wax was placed in that  holes.    AP and lateral radiographs were then taken which confirmed both the correct orientation and position of the hardware.    At this point, we copiously irrigated with normal saline, maintain hemostasis with bipolar electrocautery and thrombin paste.  Bone wax was placed to bleeding bone edges.  We then removed the retractor and closed in layers beginning with the platysma which was closed with a running 2-0 suture.  The subcutaneous tissue was closed with a 2-0 interrupted Vicryl suture.  And skin was closed with a running 4-0 Monocryl suture.  The wound was then cleaned and dried in normal fashion.  Steri-Strips and gauze were applied.  The drapes were removed, and the patient was transferred from the operating table to the stretcher at which point she was extubated by the anesthesia service without complication.  All needle and sponge counts were correct at the end of the case.        Cain Dewitt MD

## 2025-05-01 NOTE — CONSULTS
"[facility]  Consult Note - Hospitalist Service  Date of Admission:  5/1/2025  Consult Requested by: Orthopedics  Reason for Consult: Elevated blood sugar    Assessment & Plan   Nhung Concepcion is a 35 year old female admitted on 5/1/2025. She has a past medical history significant for COPD, CHANDNI, MDD, PTSD, prediabetes, and history of sleep disturbance presented to [facility] for routine elective cervical spinal surgery Hospital medicine was consulted for medical comanagement.    S/P bilateral Cervical 5-6 and cervical 6-C7 total disc arthroplasty  -Analgesics, antiemetics, DVT prophylaxis, and therapies per surgical team     History of COPD  -PTA inhaled medications    Elevated blood sugar  History of prediabetes  -MDSSI  -A1C 6.4 on 4/23/25    Anxiety  Depression  PTSD  Sleep disturbance  - PTA Abilify, duloxetine, and Lamictal     Clinically Significant Risk Factors Present on Admission                             # Morbid Obesity: Estimated body mass index is 43.58 kg/m  as calculated from the following:    Height as of this encounter: 1.676 m (5' 6\").    Weight as of this encounter: 122.5 kg (270 lb).              Emery Rivera MD  Hospitalist Service  Securely message with F[employer] (more info)  Text page via [facility] Paging/Directory   ______________________________________________________________________    Chief Complaint   Post-op    History is obtained from the patient    History of Present Illness   Nhung Concepcion is a 35 year old female admitted on 5/1/2025. She has a past medical history significant for COPD, CHANDNI, MDD, PTSD, prediabetes, and history of sleep disturbance presented to [facility] for routine elective cervical spinal surgery Hospital medicine was consulted for medical comanagement. Evaluated patient at bedside, introduced myself, explained the roll of hospital medicine, medically co-managing along side the primary team. Explained that the primary team is usually managing " everything regarding the surgery, including any pain medications, anti nausea, as well as therapies and anything else related to the procedure. Explained that we as hospitalists manage any medical conditions that already exist or may arise during your stay. Explained any and all questions to the best of my ability. Post operatively she is doing well, denies any other questions or concerns.        Past Medical History    Past Medical History:   Diagnosis Date    Anxiety     COPD (chronic obstructive pulmonary disease) (H)     Depressive disorder     Gastroesophageal reflux disease     Motion sickness        Past Surgical History   Past Surgical History:   Procedure Laterality Date    ESOPHAGOSCOPY, GASTROSCOPY, DUODENOSCOPY (EGD), COMBINED  03/25/2019    Dr. Krueger [facility]    ESOPHAGOSCOPY, GASTROSCOPY, DUODENOSCOPY (EGD), COMBINED N/A 3/25/2019    Procedure: ESOPHAGOSCOPY, GASTROSCOPY, DUODENOSCOPY;  Surgeon: Sam Krueger MD;  Location:  GI    IR ERCP  1/26/2022    ORTHOPEDIC SURGERY      right arm surgery       Medications   I have reviewed this patient's current medications          Physical Exam   Vital Signs: Temp: 97.3  F (36.3  C) Temp src: Oral BP: (!) 152/69 Pulse: 101   Resp: 14 SpO2: 96 % O2 Device: Nasal cannula Oxygen Delivery: 5 LPM  Weight: 270 lbs 0 oz    Gen: Appears well, NAD, on NC  Card:Warm well perfused, pulses assumed patient talking  Pulm: Normal I/E effort on NC  Abd:Non distended  Skin:No obvious rashes or lesions on exposed areas of skin  Neuro AxOx4, S/S grossly intact, no obvious FND,   Psych:Pleasant, answering questions appropriately, insight good, judgement good, does not appear to be responding to I/E stimuli     Medical Decision Making       50 MINUTES SPENT BY ME on the date of service doing chart review, history, exam, documentation & further activities per the note.      Data   ------------------------- PAST 24 HR DATA REVIEWED  -----------------------------------------------        Imaging results reviewed over the past 24 hrs:   Recent Results (from the past 24 hours)   XR Surgery LIZETTE Fluoro L/T 5 Min    Narrative    This exam was marked as non-reportable because it will not be read by a   radiologist or a [place] non-radiologist provider.

## 2025-05-01 NOTE — ANESTHESIA POSTPROCEDURE EVALUATION
Patient: Nhung Concepcion    Procedure: Procedure(s):  BILATERAL CERVICAL 5-6 AND CERVICAL 6-7 TOTAL DISC ARTHROPLASTY       Anesthesia Type:  General    Note:  Disposition: Admission   Postop Pain Control: Uneventful            Sign Out: Well controlled pain   PONV: No   Neuro/Psych: Uneventful            Sign Out: Acceptable/Baseline neuro status   Airway/Respiratory: Uneventful            Sign Out: Acceptable/Baseline resp. status; O2 supplementation (treated with duoneb, oxygen by nasal cannula)               Oxygen: Nasal Cannula   CV/Hemodynamics: Uneventful            Sign Out: Acceptable CV status; No obvious hypovolemia; No obvious fluid overload   Other NRE: NONE   DID A NON-ROUTINE EVENT OCCUR? No           Last vitals:  Vitals Value Taken Time   /96 05/01/25 1150   Temp 36.2  C (97.16  F) 05/01/25 1156   Pulse 107 05/01/25 1156   Resp 14 05/01/25 1156   SpO2 95 % 05/01/25 1156   Vitals shown include unfiled device data.    Electronically Signed By: Frederick Tracy MD  May 1, 2025  12:08 PM

## 2025-05-02 ENCOUNTER — APPOINTMENT (OUTPATIENT)
Dept: OCCUPATIONAL THERAPY | Facility: CLINIC | Age: 35
DRG: 518 | End: 2025-05-02
Attending: ORTHOPAEDIC SURGERY
Payer: COMMERCIAL

## 2025-05-02 VITALS
HEART RATE: 89 BPM | SYSTOLIC BLOOD PRESSURE: 134 MMHG | RESPIRATION RATE: 16 BRPM | BODY MASS INDEX: 43.39 KG/M2 | HEIGHT: 66 IN | WEIGHT: 270 LBS | DIASTOLIC BLOOD PRESSURE: 70 MMHG | OXYGEN SATURATION: 95 % | TEMPERATURE: 98.3 F

## 2025-05-02 LAB
GLUCOSE BLDC GLUCOMTR-MCNC: 119 MG/DL (ref 70–99)
HGB BLD-MCNC: 11.1 G/DL (ref 11.7–15.7)
HOLD SPECIMEN: NORMAL

## 2025-05-02 PROCEDURE — 97535 SELF CARE MNGMENT TRAINING: CPT | Mod: GO

## 2025-05-02 PROCEDURE — 250N000013 HC RX MED GY IP 250 OP 250 PS 637: Performed by: ORTHOPAEDIC SURGERY

## 2025-05-02 PROCEDURE — 250N000011 HC RX IP 250 OP 636: Mod: JZ | Performed by: ORTHOPAEDIC SURGERY

## 2025-05-02 PROCEDURE — 85018 HEMOGLOBIN: CPT | Performed by: NURSE PRACTITIONER

## 2025-05-02 PROCEDURE — 97166 OT EVAL MOD COMPLEX 45 MIN: CPT | Mod: GO

## 2025-05-02 PROCEDURE — 36415 COLL VENOUS BLD VENIPUNCTURE: CPT | Performed by: NURSE PRACTITIONER

## 2025-05-02 PROCEDURE — 99207 PR NO BILLABLE SERVICE THIS VISIT: CPT | Performed by: STUDENT IN AN ORGANIZED HEALTH CARE EDUCATION/TRAINING PROGRAM

## 2025-05-02 RX ORDER — AMOXICILLIN 250 MG
1 CAPSULE ORAL 2 TIMES DAILY
Status: SHIPPED
Start: 2025-05-02

## 2025-05-02 RX ADMIN — MULTIVITAMIN TABLET 1 TABLET: TABLET at 08:48

## 2025-05-02 RX ADMIN — ARIPIPRAZOLE 2 MG: 2 TABLET ORAL at 08:48

## 2025-05-02 RX ADMIN — OXYCODONE 10 MG: 5 TABLET ORAL at 02:31

## 2025-05-02 RX ADMIN — OXYCODONE 10 MG: 5 TABLET ORAL at 06:17

## 2025-05-02 RX ADMIN — LAMOTRIGINE 150 MG: 150 TABLET ORAL at 08:48

## 2025-05-02 RX ADMIN — OXYCODONE 10 MG: 5 TABLET ORAL at 10:19

## 2025-05-02 RX ADMIN — DULOXETINE HYDROCHLORIDE 120 MG: 60 CAPSULE, DELAYED RELEASE ORAL at 08:48

## 2025-05-02 RX ADMIN — HYDROMORPHONE HYDROCHLORIDE 0.4 MG: 0.2 INJECTION, SOLUTION INTRAMUSCULAR; INTRAVENOUS; SUBCUTANEOUS at 01:06

## 2025-05-02 RX ADMIN — ACETAMINOPHEN 975 MG: 325 TABLET, FILM COATED ORAL at 06:17

## 2025-05-02 RX ADMIN — POLYETHYLENE GLYCOL 3350 17 G: 17 POWDER, FOR SOLUTION ORAL at 08:49

## 2025-05-02 RX ADMIN — SENNOSIDES AND DOCUSATE SODIUM 1 TABLET: 50; 8.6 TABLET ORAL at 08:48

## 2025-05-02 RX ADMIN — METHOCARBAMOL 750 MG: 750 TABLET ORAL at 08:48

## 2025-05-02 RX ADMIN — BENZOCAINE AND MENTHOL 1 LOZENGE: 15; 3.6 LOZENGE ORAL at 02:33

## 2025-05-02 ASSESSMENT — ACTIVITIES OF DAILY LIVING (ADL)
ADLS_ACUITY_SCORE: 28

## 2025-05-02 NOTE — PROGRESS NOTES
I spoke with Cleo's dietician  She is requesting our office note, which I will have staff send to her  She thinks her ultimate recommendation will be a feeding tube as she is not meeting her nutritional needs  She will fax her next note to us after the patient has a few weeks to try her initial recommendations     Latoya Baltazar Occupational Therapy Discharge Summary    Reason for therapy discharge:    All goals and outcomes met, no further needs identified.    Progress towards therapy goal(s). See goals on Care Plan in [facility] electronic health record for goal details.  Goals met    Therapy recommendation(s):    No further therapy is recommended.

## 2025-05-02 NOTE — PLAN OF CARE
Patient vital signs are at baseline: Yes  Patient able to ambulate as they were prior to admission or with assist devices provided by therapies during their stay:  Yes  Patient MUST void prior to discharge:  Yes  Patient able to tolerate oral intake:  Yes  Pain has adequate pain control using Oral analgesics:  No,  Reason:  iv dilaudid use x2  Does patient have an identified :  Yes  Has goal D/C date and time been discussed with patient:  Yes    Pt is A&Ox4, VSS on RA. SBA when ambulating. Voiding adequately. Dressing is CDI. CMS intact. Pt is reporting severe pain during shift. Utilized scheduled and PRN medications along with non-pharm therapies for pain relief. Soft collar in place. Pt is passing gas.    Care hours [phone]

## 2025-05-02 NOTE — PROGRESS NOTES
05/02/25 0916   Appointment Info   Signing Clinician's Name / Credentials (OT) Dia Fortune, OTR/L   Living Environment   People in Home significant other   Current Living Arrangements house   Living Environment Comments has walk in shower and standard toilet   Self-Care   Usual Activity Tolerance good   Current Activity Tolerance moderate   Activity/Exercise/Self-Care Comment Pt has been ind with all adls /iadls prior to admit   Instrumental Activities of Daily Living (IADL)   IADL Comments partner will do until pt is recovered   General Information   Onset of Illness/Injury or Date of Surgery 05/01/25   Referring Physician Cain Jim   Patient/Family Therapy Goal Statement (OT) heal well-go back to work in 6 weeks   Additional Occupational Profile Info/Pertinent History of Current Problem Nhung Concepcion is a 35 year old female admitted on 5/1/2025. She has a past medical history significant for COPD, CHANDNI, MDD, PTSD, prediabetes, and history of sleep disturbance presented to [facility] for routine elective cervical spinal surgery   Existing Precautions/Restrictions spinal   Cognitive Status Examination   Orientation Status orientation to person, place and time   Affect/Mental Status (Cognitive) WNL   Range of Motion Comprehensive   Comment, General Range of Motion WNL   Strength Comprehensive (MMT)   Comment, General Manual Muscle Testing (MMT) Assessment WNL   Bed Mobility   Comment (Bed Mobility) cga   Transfers   Transfer Comments cga   Activities of Daily Living   BADL Assessment/Intervention upper body dressing;lower body dressing;toileting   Upper Body Dressing Assessment/Training   [place] Level (Upper Body Dressing) contact guard assist   Lower Body Dressing Assessment/Training   [place] Level (Lower Body Dressing) contact guard assist   Toileting   [place] Level (Toileting) contact guard assist   Clinical Impression   Criteria for Skilled Therapeutic Interventions Met (OT) Yes,  treatment indicated   OT Diagnosis decreased ind with adls   OT Problem List-Impairments impacting ADL flexibility;pain;post-surgical precautions   Assessment of Occupational Performance 1-3 Performance Deficits   Identified Performance Deficits dressing, toileting, grooming,   Planned Therapy Interventions (OT) ADL retraining;transfer training   Clinical Decision Making Complexity (OT) detailed assessment/moderate complexity   Risk & Benefits of therapy have been explained evaluation/treatment results reviewed;care plan/treatment goals reviewed;risks/benefits reviewed;current/potential barriers reviewed;participants voiced agreement with care plan;participants included;patient   OT Total Evaluation Time   OT Eval, Moderate Complexity Minutes (67070) 15   OT Goals   Therapy Frequency (OT) One time eval and treatment   OT Goals Upper Body Dressing;Lower Body Dressing;Bed Mobility;Toilet Transfer/Toileting   OT: Upper Body Dressing Independent;within precautions;Goal Met;Completed   OT: Lower Body Dressing Independent;within precautions;Goal Met;Completed   OT: Bed Mobility Independent;supine to/from sitting;within precautions;Goal Met;Completed   Interventions   Interventions Quick Adds Self-Care/Home Management   Self-Care/Home Management   Self-Care/Home Mgmt/ADL, Compensatory, Meal Prep Minutes (17252) 28   Symptoms Noted During/After Treatment (Meal Preparation/Planning Training) dizziness   Treatment Detail/Skilled Intervention Pt seen for OT treatment.  OT  taught pt how to dress their total body including neck brace, how to perform grooming skills, bed mobility, transfers to bed, chair, toilet and car and climb 12 stairs with a railing while following cervical neck surgical precautions. Pt performed all tasks with mod I after OT intervention. Pt became dizzy after stairs .  She rested in a chair and it went away.  She became dizzy again after practicing a car transfer. Again, it went away quickly.  notified  RN.   OT Discharge Planning   OT Plan dc OT   OT Discharge Recommendation (DC Rec) (S)  home   OT Rationale for DC Rec Pt has good support at home and has met OT goals.   OT Brief overview of current status Pt is mod I with adls and functional mobility while following cervical surgical precautions after OT intevention   OT Total Distance Amb During Session (feet) 300   Total Session Time   Timed Code Treatment Minutes 28   Total Session Time (sum of timed and untimed services) 43

## 2025-05-02 NOTE — PROGRESS NOTES
"[facility] Orthopaedics Progress Note      Post-operative Day: 1 Day Post-Op    Procedure(s):  BILATERAL CERVICAL 5 - CERVICAL 6 AND CERVICAL 6 - CERVICAL 7 TOTAL DISC ARTHROPLASTY      Subjective: Leticia is seen resting in bed and ambulating in room. Family present in room. No acute events overnight. Pain to posterior cervical spine radiating to shoulders.  Did require IV narcotics overnight now improved and tolerable with p.o. analgesics.  Radicular symptoms from prior to surgery improved.  Tolerating regular diet able to swallow without difficulty.  No nausea or vomiting.  Voiding and passing gas.  Has completed therapies feeling ready to discharge home today with family support.     Pain: moderate  Chest pain, SOB:  No      Objective:  Blood pressure 134/70, pulse 89, temperature 98.3  F (36.8  C), temperature source Oral, resp. rate 16, height 1.676 m (5' 6\"), weight 122.5 kg (270 lb), last menstrual period 03/31/2025, SpO2 95%.    Patient Vitals for the past 24 hrs:   BP Temp Temp src Pulse Resp SpO2   05/02/25 0826 134/70 98.3  F (36.8  C) Oral 89 16 95 %   05/01/25 2355 124/57 97.9  F (36.6  C) Oral 91 15 93 %   05/01/25 1906 131/63 97.6  F (36.4  C) Oral 99 -- 92 %   05/01/25 1516 (!) 149/77 -- -- 99 -- 93 %   05/01/25 1415 (!) 152/69 -- -- 101 -- 96 %   05/01/25 1315 (!) 155/73 97.3  F (36.3  C) Oral 108 14 --   05/01/25 1245 (!) 153/83 -- -- 103 13 96 %   05/01/25 1215 (!) 149/76 97.5  F (36.4  C) Oral 101 15 97 %   05/01/25 1150 (!) 146/96 97.2  F (36.2  C) -- 100 13 96 %   05/01/25 1140 (!) 142/98 97.2  F (36.2  C) -- 106 23 95 %   05/01/25 1137 (!) 142/98 97.3  F (36.3  C) -- 95 (!) 9 95 %   05/01/25 1130 (!) 164/93 97.2  F (36.2  C) -- 97 12 94 %   05/01/25 1120 127/85 97.7  F (36.5  C) -- 94 (!) 7 95 %   05/01/25 1110 (!) 146/88 97.9  F (36.6  C) Core 94 (!) 8 95 %   05/01/25 1100 (!) 141/74 97.9  F (36.6  C) -- 94 13 93 %   05/01/25 1057 (!) 152/81 97.9  F (36.6  C) -- 105 12 93 %       Wt Readings " from Last 4 Encounters:   05/01/25 122.5 kg (270 lb)   12/30/24 114.4 kg (252 lb 3.2 oz)   12/21/24 113.4 kg (250 lb)   12/11/24 109.3 kg (241 lb)       General: Alert and orientated, NAD  Respiratory: Non-labored breathing on RA  BUE/BLE motor function, sensation, and circulation intact   Yes, Dorsiflexion/plantarflexion intact and equal bilaterally. Moves all other extremities with ease and purpose   Wound status: incisions are clean dry and intact. Yes  Calf tenderness: Bilateral  No    Pertinent Labs   Lab Results: personally reviewed.     Recent Labs   Lab Test 05/02/25  1001 12/21/24  1208 10/25/24  1543 02/28/24  1530 11/01/23  1159 01/10/19  1620 01/10/19  1558   WBC  --  12.6*  --  9.6 10.4   < > 9.5   HGB 11.1* 13.1  --  12.8 13.3   < > 12.0   HCT  --  38.3  --  38.0 39.2   < > 37.1   MCV  --  91  --  93 92   < > 90   PLT  --  389  --  328 345   < > 258   NA  --  138 136 138 140   < >  --    CRP  --   --   --   --   --   --  <2.9    < > = values in this interval not displayed.       Plan:   Naproxen at discharge to start 72 hours postop  Anticoagulation protocol: Mechanical and/or ambulation    Pain medications: Multimodal approach with ice scopainmedication: oxycodone, tylenol, vistaril, and Robaxin   Weight bearing status:  WBAT, strict no heavy lifting, twisting, or bending requirements, collar for comfort  Disposition: Home today pending clearance from therapies and hospitalist  Continue cares and rehabilitation     Report completed by:  YEN Bedolla CNP  Date: 5/2/2025  Time: 10:54 AM

## 2025-05-02 NOTE — PLAN OF CARE
Goal Outcome Evaluation:      Plan of Care Reviewed With: patient    Overall Patient Progress: improvingOverall Patient Progress: improving    Outcome Evaluation: Pt alert and oriented x 4. CMS intact. Dressing CDI. Passed OT, does not need to see PT. Pain controlled on PRN oxy and robaxin. Plan to discharge home with family today.      Problem: Pain Acute  Goal: Optimal Pain Control and Function  Outcome: Met  Intervention: Develop Pain Management Plan  Recent Flowsheet Documentation  Taken 5/2/2025 1019 by Tony Russell RN  Pain Management Interventions: medication (see MAR)  Taken 5/2/2025 0848 by Tony Russell RN  Pain Management Interventions: medication (see MAR)  Intervention: Prevent or Manage Pain  Recent Flowsheet Documentation  Taken 5/2/2025 0848 by Tony Russell RN  Medication Review/Management: medications reviewed     Problem: Spinal Surgery  Goal: Optimal Functional Ability  Outcome: Met  Intervention: Optimize Functional Status  Recent Flowsheet Documentation  Taken 5/2/2025 1019 by Tony Russell RN  Activity Management:   ambulated outside room   sitting, edge of bed  Taken 5/2/2025 0848 by Tony Russell RN  Activity Management: up in chair  Positioning/Transfer Devices:   pillows   in use     Problem: Spinal Surgery  Goal: Effective Urinary Elimination  Outcome: Met     Problem: Spinal Surgery  Goal: Effective Oxygenation and Ventilation  Outcome: Met    Problem: Spinal Surgery  Goal: Nausea and Vomiting Relief  Outcome: Met     Problem: Spinal Surgery  Goal: Absence of Bleeding  Outcome: Met  Intervention: Monitor and Manage Bleeding  Recent Flowsheet Documentation  Taken 5/2/2025 0848 by Tony Russell RN  Bleeding Management: dressing monitored     Problem: Adult Inpatient Plan of Care  Goal: Absence of Hospital-Acquired Illness or Injury  Intervention: Identify and Manage Fall Risk  Recent Flowsheet Documentation  Taken 5/2/2025 0848 by Tony Russell RN  Safety  Promotion/Fall Prevention: safety round/check completed     Tony Russell RN, ONC

## 2025-05-02 NOTE — DISCHARGE SUMMARY
[facility] Orthopedics Discharge Summary                                  [facility]     HERBERTH THOMPSON [id number]   Age: 35 year old  PCP: Serene Lyle, [phone] 1990     Date of Admission:  5/1/2025  Date of Discharge::  5/2/2025  Discharge Provider:  YEN Bedolla CNP    Code status:  Full Code    Admission Information:  Admission Diagnosis:  Neck pain [M54.2]  Herniated nucleus pulposus, C5-6 [M50.222]  Herniated nucleus pulposus, C6-7 [M50.223]  Cervical disc herniation [M50.20]    Post-Operative Day: 1 Day Post-Op     Reason for admission:  The patient was admitted for the following:Procedure(s) (LRB):  BILATERAL CERVICAL 5 - CERVICAL 6 AND CERVICAL 6 - CERVICAL 7 TOTAL DISC ARTHROPLASTY (Bilateral)    Active Problems:    Cervical disc herniation      Allergies:  Wellbutrin [bupropion], Sulfa antibiotics, and Varenicline    Following the procedure noted above the patient was transferred to the post-op floor and started on:    Therapy:  physical therapy and occupational therapy  Anticoagulation Plan: Mechanical and/or ambulation     Pain Management: scopainmedication: oxycodone, tylenol, vistaril, and Robaxin  Weight bearing status: WBAT, strict no heavy lifting, twisting, or bending requirements for the next 3 months        The patient was followed by Orthopedics during the inpatient treatment course:  Complications:  None  Additional consultations:  Hospitalist      Pertinent Labs   Lab Results: personally reviewed.     Recent Labs   Lab Test 05/02/25  1001 12/21/24  1208 10/25/24  1543 02/28/24  1530 11/01/23  1159 01/10/19  1620 01/10/19  1558   WBC  --  12.6*  --  9.6 10.4   < > 9.5   HGB 11.1* 13.1  --  12.8 13.3   < > 12.0   HCT  --  38.3  --  38.0 39.2   < > 37.1   MCV  --  91  --  93 92   < > 90   PLT  --  389  --  328 345   < > 258   NA  --  138 136 138 140   < >  --    CRP  --   --   --   --   --   --  <2.9    < > = values in this interval not displayed.           Discharge Information:  Condition at discharge: Stable  Discharge destination:  Discharged to home     Medications at discharge:  Discharge Medication List as of 5/2/2025 10:58 AM        START taking these medications    Details   acetaminophen (TYLENOL) 325 MG tablet Take 3 tablets (975 mg) by mouth every 8 hours., OTC      methocarbamol (ROBAXIN) 750 MG tablet Take 1 tablet (750 mg) by mouth every 6 hours as needed for muscle spasms., Disp-30 tablet, R-0, E-Prescribe      naproxen (NAPROSYN) 500 MG tablet Take 1 tablet (500 mg) by mouth daily with food. Do not start until > 72 hours after surgery, Disp-42 tablet, R-0, E-Prescribe      ondansetron (ZOFRAN ODT) 4 MG ODT tab Take 1 tablet (4 mg) by mouth every 6 hours as needed for nausea or vomiting., Disp-10 tablet, R-0, E-Prescribe      oxyCODONE (ROXICODONE) 5 MG tablet Take 1-2 tablets (5-10 mg) by mouth every 4 hours as needed for moderate pain., Disp-25 tablet, R-0, E-Prescribe      senna-docusate (SENOKOT-S/PERICOLACE) 8.6-50 MG tablet Take 1 tablet by mouth 2 times daily., No Print Out           CONTINUE these medications which have NOT CHANGED    Details   ARIPiprazole (ABILIFY) 2 MG tablet Take 2 mg by mouth daily., Historical      budesonide-formoterol (SYMBICORT) 80-4.5 MCG/ACT Inhaler Inhale 2 puffs into the lungs 2 times daily., Disp-10.2 g, R-2, E-Prescribe      DULoxetine (CYMBALTA) 60 MG capsule Take 2 capsules (120 mg) by mouth daily., Historical      hydrOXYzine HCl (ATARAX) 10 MG tablet Take 3-5 tablets by mouth 2 times daily as needed for anxiety., Historical      ipratropium - albuterol 0.5 mg/2.5 mg/3 mL (DUONEB) 0.5-2.5 (3) MG/3ML neb solution Take 1 vial (3 mLs) by nebulization every 6 hours as needed for shortness of breath, wheezing or cough., Disp-90 mL, R-11, E-Prescribe      !! lamoTRIgine (LAMICTAL) 150 MG tablet Take 150 mg by mouth every morning., Historical      !! lamoTRIgine (LAMICTAL) 200 MG tablet Take 200 mg by mouth at  bedtime., Historical      levalbuterol (XOPENEX HFA) 45 MCG/ACT inhaler Inhale 2 puffs into the lungs every 4 hours as needed for shortness of breath or wheezing., Disp-15 g, R-5, E-Prescribe      loratadine (CLARITIN) 10 MG tablet Take 10 mg by mouth daily., Historical       !! - Potential duplicate medications found. Please discuss with provider.                     Follow-Up Care:  Patient should be seen in the office in 14-21 days by the Orthopedic Surgeon/Physician Assistant.  Call [phone] for appointment or questions.    It was my pleasure to take care of the above patient.  YEN Bedolla CNP

## 2025-05-02 NOTE — PROGRESS NOTES
Brief progress note:     Reviewed chart, including nursing notes, vitals, labs, and imaging. Blood sugars are up, did receive intraoperative steroids, A1C 6.4, encourage lifestyle modification and recheck with PCP may need to start on orals if this continues. From a medical standpoint patient is cleared for discharge. [facility] portion of med rec is complete. Please feel free to reach out with any questions or concerns. [facility] will continue to follow peripherally until discharge    Emery Rivera MD

## 2025-05-02 NOTE — PROGRESS NOTES
Physical Therapy: Orders received. Chart reviewed and discussed with care team.? Physical Therapy not indicated due to per OT, pt has no skilled PT concerns/needs.? Defer discharge recommendations to care team and nursing.? Will complete orders.   Sharon Plascencia, PT, DPT

## 2025-05-02 NOTE — PROGRESS NOTES
Completed discharge paperwork with patient and family, patient and family stated understanding of discharge paperwork, belongings sent with patient, IV removed, discharged safely.

## 2025-05-11 ENCOUNTER — MYC REFILL (OUTPATIENT)
Dept: PULMONOLOGY | Facility: CLINIC | Age: 35
End: 2025-05-11
Payer: COMMERCIAL

## 2025-05-11 DIAGNOSIS — J44.9 CHRONIC OBSTRUCTIVE PULMONARY DISEASE, UNSPECIFIED COPD TYPE (H): ICD-10-CM

## 2025-05-12 RX ORDER — BUDESONIDE AND FORMOTEROL FUMARATE DIHYDRATE 80; 4.5 UG/1; UG/1
2 AEROSOL RESPIRATORY (INHALATION) 2 TIMES DAILY
Qty: 10.2 G | Refills: 2 | Status: SHIPPED | OUTPATIENT
Start: 2025-05-12

## 2025-07-19 ENCOUNTER — HEALTH MAINTENANCE LETTER (OUTPATIENT)
Age: 35
End: 2025-07-19

## (undated) DEVICE — ENDO BITE BLOCK ADULT OLYMPUS LATEX FREE MAJ-1632

## (undated) DEVICE — Device

## (undated) DEVICE — IMM COLLAR CERVICAL MED UNIVERSAL 2.5X24" 79-83520

## (undated) DEVICE — PREP CHLORAPREP W/ORANGE TINT 10.5ML 930715

## (undated) DEVICE — SPONGE KITNER DISSECTING 7102*

## (undated) DEVICE — DRAPE STERI TOWEL LG 1010

## (undated) DEVICE — RX SURGIFLO HEMOSTATIC MATRIX 8ML 2991

## (undated) DEVICE — ELECTRODE PATIENT RETURN ADULT L10 FT 2 PLATE CORD 0855C

## (undated) DEVICE — PROTECTOR ARM STANDARD ONE STEP 40433 (COI)

## (undated) DEVICE — BLADE KNIFE SURG 15 371115

## (undated) DEVICE — DRSG STERI STRIP 1/2X4" R1547

## (undated) DEVICE — CATH TRAY FOLEY SURESTEP 16FR DRAIN BAG STATOCK A899916

## (undated) DEVICE — ESU ELEC BLADE 2.75" COATED/INSULATED E1455

## (undated) DEVICE — GLOVE SURG PI ULTRA TOUCH M SZ 7-1/2 LF

## (undated) DEVICE — SOL NACL 0.9% IRRIG 1000ML BOTTLE 2F7124

## (undated) DEVICE — SUTURE MONOCRYL+ 4-0 PS-2 27IN MCP426H

## (undated) DEVICE — DRSG PRIMAPORE 03 1/8X6" 66000318

## (undated) DEVICE — CATH IV 14GA 2IN REM FLASHPLUG DEHP-FR PVC FR 4251717-02

## (undated) DEVICE — ADH LIQUID MASTISOL TOPICAL VIAL 2-3ML 0523-48

## (undated) DEVICE — PACK MINOR SINGLE BASIN SSK3001

## (undated) DEVICE — PIN DISTRACTION 12MM TI BLUE DP-12-TB

## (undated) DEVICE — GLOVE UNDER INDICATOR PI SZ 7.0 LF 41670

## (undated) DEVICE — SOL WATER IRRIG 1000ML BOTTLE 2F7114

## (undated) DEVICE — PAD MAGNETIC INST LRG 16IN X 20IN FM LF STR L80720CARD

## (undated) DEVICE — DRAPE C-ARM 60X42" 1013

## (undated) DEVICE — DRAPE THYROID

## (undated) DEVICE — GLOVE BIOGEL INDICATOR 7.5 LF 41675

## (undated) DEVICE — GLOVE BIOGEL PI ULTRATOUCH G SZ 7.0 42170

## (undated) DEVICE — SU VICRYL+ 3-0 27IN SH UND VCP416H

## (undated) DEVICE — NEEDLE SPINAL DISP 18GA X 3.5" QUINCKE 333350

## (undated) DEVICE — TOOL DISSECT MIDAS MR8 14CM MATCH HEAD 3MM MR8-14MH30

## (undated) DEVICE — SUCTION MANIFOLD NEPTUNE 2 SYS 1 PORT 702-025-000

## (undated) DEVICE — ESU PENCIL SMOKE EVAC W/ROCKER SWITCH 0703-047-000

## (undated) DEVICE — KIT ENDO TURNOVER/PROCEDURE W/CLEAN A SCOPE LINERS 103888

## (undated) DEVICE — SPONGE NEURO 1/2X1/2 WECK 200100

## (undated) DEVICE — CUSTOM PACK LUMBAR FUSION SNE5BLFHEA

## (undated) RX ORDER — PROPOFOL 10 MG/ML
INJECTION, EMULSION INTRAVENOUS
Status: DISPENSED
Start: 2025-05-01

## (undated) RX ORDER — FENTANYL CITRATE 50 UG/ML
INJECTION, SOLUTION INTRAMUSCULAR; INTRAVENOUS
Status: DISPENSED
Start: 2025-05-01

## (undated) RX ORDER — FENTANYL CITRATE 50 UG/ML
INJECTION, SOLUTION INTRAMUSCULAR; INTRAVENOUS
Status: DISPENSED
Start: 2019-03-25

## (undated) RX ORDER — DIPHENHYDRAMINE HYDROCHLORIDE 50 MG/ML
INJECTION INTRAMUSCULAR; INTRAVENOUS
Status: DISPENSED
Start: 2019-03-25